# Patient Record
Sex: MALE | Race: WHITE | Employment: FULL TIME | URBAN - METROPOLITAN AREA
[De-identification: names, ages, dates, MRNs, and addresses within clinical notes are randomized per-mention and may not be internally consistent; named-entity substitution may affect disease eponyms.]

---

## 2017-06-19 ENCOUNTER — OFFICE VISIT (OUTPATIENT)
Dept: INTERNAL MEDICINE CLINIC | Age: 42
End: 2017-06-19

## 2017-06-19 VITALS
OXYGEN SATURATION: 98 % | SYSTOLIC BLOOD PRESSURE: 118 MMHG | BODY MASS INDEX: 35.12 KG/M2 | DIASTOLIC BLOOD PRESSURE: 70 MMHG | HEART RATE: 97 BPM | WEIGHT: 231 LBS

## 2017-06-19 DIAGNOSIS — K21.9 GASTROESOPHAGEAL REFLUX DISEASE, ESOPHAGITIS PRESENCE NOT SPECIFIED: Primary | ICD-10-CM

## 2017-06-19 PROCEDURE — 99213 OFFICE O/P EST LOW 20 MIN: CPT | Performed by: INTERNAL MEDICINE

## 2017-06-19 RX ORDER — PANTOPRAZOLE SODIUM 40 MG/1
40 TABLET, DELAYED RELEASE ORAL DAILY
Qty: 30 TABLET | Refills: 1 | Status: SHIPPED | OUTPATIENT
Start: 2017-06-19 | End: 2018-11-07

## 2017-06-21 ENCOUNTER — TELEPHONE (OUTPATIENT)
Dept: INTERNAL MEDICINE CLINIC | Age: 42
End: 2017-06-21

## 2017-06-22 ASSESSMENT — ENCOUNTER SYMPTOMS
SINUS PRESSURE: 0
RHINORRHEA: 0
COUGH: 0
CHEST TIGHTNESS: 0
SHORTNESS OF BREATH: 0
EYE REDNESS: 0
DIARRHEA: 1
RESPIRATORY NEGATIVE: 1
VOMITING: 0
BACK PAIN: 0
NAUSEA: 1
SORE THROAT: 0
WHEEZING: 0
ABDOMINAL PAIN: 1
EYES NEGATIVE: 1
CONSTIPATION: 0

## 2017-07-03 ENCOUNTER — TELEPHONE (OUTPATIENT)
Dept: INTERNAL MEDICINE CLINIC | Age: 42
End: 2017-07-03

## 2017-07-03 ENCOUNTER — OFFICE VISIT (OUTPATIENT)
Dept: INTERNAL MEDICINE CLINIC | Age: 42
End: 2017-07-03

## 2017-07-03 VITALS
RESPIRATION RATE: 20 BRPM | HEIGHT: 68 IN | SYSTOLIC BLOOD PRESSURE: 104 MMHG | DIASTOLIC BLOOD PRESSURE: 70 MMHG | BODY MASS INDEX: 34.1 KG/M2 | TEMPERATURE: 98 F | HEART RATE: 85 BPM | WEIGHT: 225 LBS | OXYGEN SATURATION: 99 %

## 2017-07-03 DIAGNOSIS — K21.9 GASTROESOPHAGEAL REFLUX DISEASE, ESOPHAGITIS PRESENCE NOT SPECIFIED: Primary | ICD-10-CM

## 2017-07-03 DIAGNOSIS — R10.13 EPIGASTRIC ABDOMINAL PAIN: ICD-10-CM

## 2017-07-03 PROCEDURE — 99213 OFFICE O/P EST LOW 20 MIN: CPT | Performed by: FAMILY MEDICINE

## 2017-07-03 ASSESSMENT — ENCOUNTER SYMPTOMS
BLOOD IN STOOL: 0
ABDOMINAL PAIN: 1

## 2018-02-09 ENCOUNTER — TELEPHONE (OUTPATIENT)
Dept: ORTHOPEDIC SURGERY | Age: 43
End: 2018-02-09

## 2018-09-20 ENCOUNTER — TELEPHONE (OUTPATIENT)
Dept: ORTHOPEDIC SURGERY | Age: 43
End: 2018-09-20

## 2018-09-20 ENCOUNTER — OFFICE VISIT (OUTPATIENT)
Dept: ORTHOPEDIC SURGERY | Age: 43
End: 2018-09-20

## 2018-09-20 VITALS
HEART RATE: 74 BPM | DIASTOLIC BLOOD PRESSURE: 67 MMHG | SYSTOLIC BLOOD PRESSURE: 134 MMHG | HEIGHT: 68 IN | BODY MASS INDEX: 31.83 KG/M2 | WEIGHT: 210 LBS

## 2018-09-20 DIAGNOSIS — S86.112A STRAIN OF GASTROCNEMIUS MUSCLE OF LEFT LOWER EXTREMITY, INITIAL ENCOUNTER: Primary | ICD-10-CM

## 2018-09-20 PROCEDURE — G8417 CALC BMI ABV UP PARAM F/U: HCPCS | Performed by: PHYSICIAN ASSISTANT

## 2018-09-20 PROCEDURE — 99213 OFFICE O/P EST LOW 20 MIN: CPT | Performed by: PHYSICIAN ASSISTANT

## 2018-09-20 PROCEDURE — L4360 PNEUMAT WALKING BOOT PRE CST: HCPCS | Performed by: PHYSICIAN ASSISTANT

## 2018-09-20 PROCEDURE — 1036F TOBACCO NON-USER: CPT | Performed by: PHYSICIAN ASSISTANT

## 2018-09-20 PROCEDURE — G8427 DOCREV CUR MEDS BY ELIG CLIN: HCPCS | Performed by: PHYSICIAN ASSISTANT

## 2018-09-20 RX ORDER — TRAMADOL HYDROCHLORIDE 50 MG/1
50 TABLET ORAL EVERY 6 HOURS PRN
Qty: 28 TABLET | Refills: 0 | Status: SHIPPED | OUTPATIENT
Start: 2018-09-20 | End: 2018-09-27
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
## 2018-09-24 ENCOUNTER — TELEPHONE (OUTPATIENT)
Dept: ORTHOPEDIC SURGERY | Age: 43
End: 2018-09-24

## 2018-09-24 NOTE — LETTER
[facility]  [address] [facility] [address]  Phone: [phone]  Fax: [phone]    Shelba Liner        September 24, 2018     Patient: Annalise Julio   YOB: 1975   Date of Visit: 9/24/2018       To Whom It May Concern: It is my medical opinion that Maxi Pink should remain out of work until 10/1/2018. If you have any questions or concerns, please don't hesitate to call.     Sincerely,        Cristal Blount PA-C

## 2018-09-26 ENCOUNTER — TELEPHONE (OUTPATIENT)
Dept: ORTHOPEDIC SURGERY | Age: 43
End: 2018-09-26

## 2018-10-03 ENCOUNTER — TELEPHONE (OUTPATIENT)
Dept: ORTHOPEDIC SURGERY | Age: 43
End: 2018-10-03

## 2018-10-03 ENCOUNTER — OFFICE VISIT (OUTPATIENT)
Dept: ORTHOPEDIC SURGERY | Age: 43
End: 2018-10-03
Payer: MEDICARE

## 2018-10-03 VITALS
WEIGHT: 190 LBS | HEART RATE: 71 BPM | BODY MASS INDEX: 28.79 KG/M2 | DIASTOLIC BLOOD PRESSURE: 88 MMHG | SYSTOLIC BLOOD PRESSURE: 132 MMHG | HEIGHT: 68 IN

## 2018-10-03 DIAGNOSIS — S86.112D GASTROCNEMIUS MUSCLE STRAIN, LEFT, SUBSEQUENT ENCOUNTER: ICD-10-CM

## 2018-10-03 DIAGNOSIS — S86.112D STRAIN OF LEFT SOLEUS MUSCLE, SUBSEQUENT ENCOUNTER: Primary | ICD-10-CM

## 2018-10-03 PROCEDURE — 99213 OFFICE O/P EST LOW 20 MIN: CPT | Performed by: PHYSICIAN ASSISTANT

## 2018-10-03 RX ORDER — IBUPROFEN 200 MG
200 TABLET ORAL EVERY 6 HOURS PRN
COMMUNITY
End: 2018-11-07

## 2018-10-03 RX ORDER — TRAMADOL HYDROCHLORIDE 50 MG/1
50 TABLET ORAL EVERY 6 HOURS PRN
Qty: 28 TABLET | Refills: 0 | Status: SHIPPED | OUTPATIENT
Start: 2018-10-03 | End: 2018-10-10

## 2018-10-10 ENCOUNTER — TELEPHONE (OUTPATIENT)
Dept: ORTHOPEDIC SURGERY | Age: 43
End: 2018-10-10

## 2018-10-10 ENCOUNTER — HOSPITAL ENCOUNTER (OUTPATIENT)
Dept: MRI IMAGING | Age: 43
Discharge: HOME | End: 2018-10-10
Payer: COMMERCIAL

## 2018-10-10 DIAGNOSIS — S86.112D GASTROCNEMIUS MUSCLE STRAIN, LEFT, SUBSEQUENT ENCOUNTER: ICD-10-CM

## 2018-10-10 DIAGNOSIS — S86.112D STRAIN OF LEFT SOLEUS MUSCLE, SUBSEQUENT ENCOUNTER: ICD-10-CM

## 2018-10-10 PROCEDURE — 73718 MRI LOWER EXTREMITY W/O DYE: CPT

## 2018-10-19 ENCOUNTER — OFFICE VISIT (OUTPATIENT)
Dept: ORTHOPEDIC SURGERY | Age: 43
End: 2018-10-19
Payer: MEDICARE

## 2018-10-19 ENCOUNTER — TELEPHONE (OUTPATIENT)
Dept: ORTHOPEDIC SURGERY | Age: 43
End: 2018-10-19

## 2018-10-19 VITALS
BODY MASS INDEX: 28.79 KG/M2 | DIASTOLIC BLOOD PRESSURE: 79 MMHG | WEIGHT: 190 LBS | HEART RATE: 68 BPM | HEIGHT: 68 IN | SYSTOLIC BLOOD PRESSURE: 119 MMHG

## 2018-10-19 DIAGNOSIS — S96.812A RUPTURE OF LEFT PLANTARIS TENDON, INITIAL ENCOUNTER: Primary | ICD-10-CM

## 2018-10-19 PROCEDURE — G8417 CALC BMI ABV UP PARAM F/U: HCPCS | Performed by: ORTHOPAEDIC SURGERY

## 2018-10-19 PROCEDURE — 99213 OFFICE O/P EST LOW 20 MIN: CPT | Performed by: ORTHOPAEDIC SURGERY

## 2018-10-19 PROCEDURE — 1036F TOBACCO NON-USER: CPT | Performed by: ORTHOPAEDIC SURGERY

## 2018-10-19 PROCEDURE — G8427 DOCREV CUR MEDS BY ELIG CLIN: HCPCS | Performed by: ORTHOPAEDIC SURGERY

## 2018-10-19 PROCEDURE — APPNB15 APP NON BILLABLE TIME 0-15 MINS: Performed by: ORTHOPAEDIC SURGERY

## 2018-10-19 PROCEDURE — G8484 FLU IMMUNIZE NO ADMIN: HCPCS | Performed by: ORTHOPAEDIC SURGERY

## 2018-10-23 ENCOUNTER — TELEPHONE (OUTPATIENT)
Dept: ORTHOPEDIC SURGERY | Age: 43
End: 2018-10-23

## 2018-10-24 ENCOUNTER — HOSPITAL ENCOUNTER (OUTPATIENT)
Dept: PHYSICAL THERAPY | Age: 43
Setting detail: THERAPIES SERIES
Discharge: HOME | End: 2018-10-24
Payer: MEDICARE

## 2018-10-24 PROCEDURE — 97110 THERAPEUTIC EXERCISES: CPT

## 2018-10-24 PROCEDURE — G8979 MOBILITY GOAL STATUS: HCPCS

## 2018-10-24 PROCEDURE — 97161 PT EVAL LOW COMPLEX 20 MIN: CPT

## 2018-10-24 PROCEDURE — G8978 MOBILITY CURRENT STATUS: HCPCS

## 2018-10-24 PROCEDURE — 97530 THERAPEUTIC ACTIVITIES: CPT

## 2018-10-24 ASSESSMENT — PAIN DESCRIPTION - DESCRIPTORS: DESCRIPTORS: ACHING;DISCOMFORT;DULL;PRESSURE

## 2018-10-24 ASSESSMENT — PAIN SCALES - GENERAL: PAINLEVEL_OUTOF10: 3

## 2018-10-24 ASSESSMENT — PAIN DESCRIPTION - PAIN TYPE: TYPE: ACUTE PAIN;CHRONIC PAIN

## 2018-10-24 ASSESSMENT — PAIN DESCRIPTION - FREQUENCY: FREQUENCY: CONTINUOUS

## 2018-10-24 ASSESSMENT — PAIN DESCRIPTION - LOCATION: LOCATION: LEG

## 2018-10-24 ASSESSMENT — PAIN DESCRIPTION - ORIENTATION: ORIENTATION: LEFT

## 2018-10-26 ENCOUNTER — HOSPITAL ENCOUNTER (OUTPATIENT)
Dept: PHYSICAL THERAPY | Age: 43
Setting detail: THERAPIES SERIES
Discharge: HOME | End: 2018-10-26
Payer: MEDICARE

## 2018-10-26 PROCEDURE — 97110 THERAPEUTIC EXERCISES: CPT

## 2018-10-26 PROCEDURE — 97140 MANUAL THERAPY 1/> REGIONS: CPT

## 2018-10-30 ENCOUNTER — HOSPITAL ENCOUNTER (OUTPATIENT)
Dept: PHYSICAL THERAPY | Age: 43
Setting detail: THERAPIES SERIES
Discharge: HOME | End: 2018-10-30
Payer: MEDICARE

## 2018-10-30 PROCEDURE — 97140 MANUAL THERAPY 1/> REGIONS: CPT

## 2018-10-30 PROCEDURE — 97110 THERAPEUTIC EXERCISES: CPT

## 2018-11-01 ENCOUNTER — HOSPITAL ENCOUNTER (OUTPATIENT)
Dept: PHYSICAL THERAPY | Age: 43
Setting detail: THERAPIES SERIES
Discharge: HOME | End: 2018-11-01
Payer: MEDICARE

## 2018-11-01 PROCEDURE — 97110 THERAPEUTIC EXERCISES: CPT

## 2018-11-01 PROCEDURE — 97140 MANUAL THERAPY 1/> REGIONS: CPT

## 2018-11-01 NOTE — FLOWSHEET NOTE
Physical Therapy Daily Treatment Note  Date:  2018    Patient Name:  Cha Subramanian    :  1975  MRN: [id number]  Restrictions/Precautions:  WBAT in walking boot until weaning out -  Medical/Treatment Diagnosis Information:   Diagnosis: Rupture of L plantaris muscle  Treatment Diagnosis: L lower LE pain, decreased L ankle ROM, decreased posterior compartment flexiblity, decreased gross L ankle strength, decreased jt mobility L ankle/foot    Tracking Information:  Physician Information Referring Practitioner: Sharron Black     Plan of Care Sent Date:10/24  Signed Received:    Visit Count / Total Visits      Insurance Approved Visits  1/??  Approved Dates:     Insurance Information PT Insurance Information: [place]   Progress Note/G-codes   []  Yes  [x]  No Next Due: next f/u 18     Pain level: 3-4/10 L medial calf    Subjective:  Pt reports he is doing okay today. States he's able to get swelling down for about an hour but then the swelling comes back. Objective:   Observation:   Test measurements:    10/30: AROM DF: 0 deg    Exercises:  Exercise/Equipment Resistance/Repetitions Other comments   Nu Step     Mat Table Long Sitting:   Gentle gastroc towel stretch 2 x 30 sec L LE only  Soleus stretch with towel 2 x 30 sec L LE       Yellow TB x 10 each  DF, PF, inver, ever     AAROM inv and evr x 10  AAROM DF/PF x 10      Seated at EOM:  BAPS DF/PF L2 x 10  Towel toe curl 2 x 10 : Attempted Circles on BAPS but unable to do full ROM                                                                  Other Therapeutic Activities:  10/24: Pt was educated on PT POC, Diagnosis, Prognosis, pathomechanics as well as frequency and duration of scheduling future physical therapy appointments. Time was also taken on this day to answer all patient questions and participation in PT.  Pt ed regarding appropriate technique for elevation    Home Exercise Program:  10/24: Patient was educated on home exercise program including distrubution of handout describing exercises, sets, repetitions, frequency and intensity. Exercises/activities include: towel gastroc stretch, ankle circles, ankle pumps    Manual Treatments:    10/31, 10/30: PROM ankle DF/manual gastroc stretching, subtalar and TC distraction grade II-III, 1st ray DF PROM, forefoot PROM inversion/eversion/splaying  10/26: gentle PROM TCJ, calcaneous mobs all directions grade 1-2, toe flexion and ext mobs grade 2, metatarsal mobs grade 2 x 9 minutes   10/24: gentle PROM DF    Modalities:  Declines ice    Timed Code Treatment Minutes:  28    Total Treatment Minutes:  28    Treatment/Activity Tolerance:  [x] Patient tolerated treatment well [] Patient limited by fatigue  [] Patient limited by pain  [] Patient limited by other medical complications  [x] Other: Pt presents with improved AROM DF this date. Continues to struggle with AROM inversion and eversion. Consider WB gastroc and soleus stretching at floor next week. Prognosis: [x] Good [] Fair  [] Poor    Patient Requires Follow-up: [x] Yes  [] No    Plan:   [x] Continue per plan of care [] Alter current plan (see comments)  [] Plan of care initiated [] Hold pending MD visit [] Discharge    Plan for Next Session:  WBAT in boot until weaning Nov 2-9 per Dr. Jaimee Qureshi note, focus on regaining ROM, jt mobility, strength    Electronically signed by:  Manasa Kuhn SPT  Therapist was present, directed the patient's care, made skilled judgement, and was responsible for assessment and treatment of the patient.

## 2018-11-06 ENCOUNTER — HOSPITAL ENCOUNTER (OUTPATIENT)
Dept: PHYSICAL THERAPY | Age: 43
Setting detail: THERAPIES SERIES
Discharge: HOME | End: 2018-11-06
Payer: MEDICARE

## 2018-11-06 PROCEDURE — 97140 MANUAL THERAPY 1/> REGIONS: CPT

## 2018-11-06 PROCEDURE — 97110 THERAPEUTIC EXERCISES: CPT

## 2018-11-07 ENCOUNTER — OFFICE VISIT (OUTPATIENT)
Dept: PRIMARY CARE CLINIC | Age: 43
End: 2018-11-07
Payer: MEDICARE

## 2018-11-07 VITALS
HEIGHT: 68 IN | DIASTOLIC BLOOD PRESSURE: 82 MMHG | BODY MASS INDEX: 31.07 KG/M2 | HEART RATE: 80 BPM | WEIGHT: 205 LBS | OXYGEN SATURATION: 98 % | SYSTOLIC BLOOD PRESSURE: 113 MMHG | TEMPERATURE: 97.6 F

## 2018-11-07 DIAGNOSIS — Z00.00 PREVENTATIVE HEALTH CARE: Primary | ICD-10-CM

## 2018-11-07 DIAGNOSIS — J45.30 MILD PERSISTENT ASTHMA WITHOUT COMPLICATION: ICD-10-CM

## 2018-11-07 PROCEDURE — G8484 FLU IMMUNIZE NO ADMIN: HCPCS | Performed by: INTERNAL MEDICINE

## 2018-11-07 PROCEDURE — 99386 PREV VISIT NEW AGE 40-64: CPT | Performed by: INTERNAL MEDICINE

## 2018-11-07 RX ORDER — SUCRALFATE 1 G/1
1 TABLET ORAL 4 TIMES DAILY
Qty: 120 TABLET | Refills: 5 | Status: SHIPPED | OUTPATIENT
Start: 2018-11-07 | End: 2021-12-27 | Stop reason: SDUPTHER

## 2018-11-07 ASSESSMENT — PATIENT HEALTH QUESTIONNAIRE - PHQ9
2. FEELING DOWN, DEPRESSED OR HOPELESS: 0
SUM OF ALL RESPONSES TO PHQ QUESTIONS 1-9: 0
1. LITTLE INTEREST OR PLEASURE IN DOING THINGS: 0
SUM OF ALL RESPONSES TO PHQ QUESTIONS 1-9: 0
SUM OF ALL RESPONSES TO PHQ9 QUESTIONS 1 & 2: 0

## 2018-11-07 ASSESSMENT — ENCOUNTER SYMPTOMS
BACK PAIN: 0
CONSTIPATION: 0
VOMITING: 0
SHORTNESS OF BREATH: 0
BLOOD IN STOOL: 0
COUGH: 0
DIARRHEA: 0
NAUSEA: 0
ABDOMINAL PAIN: 0

## 2018-11-09 ENCOUNTER — HOSPITAL ENCOUNTER (OUTPATIENT)
Dept: PHYSICAL THERAPY | Age: 43
Setting detail: THERAPIES SERIES
Discharge: HOME | End: 2018-11-09
Payer: MEDICARE

## 2018-11-09 ENCOUNTER — OFFICE VISIT (OUTPATIENT)
Dept: ORTHOPEDIC SURGERY | Age: 43
End: 2018-11-09
Payer: MEDICARE

## 2018-11-09 VITALS
DIASTOLIC BLOOD PRESSURE: 79 MMHG | HEIGHT: 68 IN | HEART RATE: 80 BPM | WEIGHT: 205 LBS | BODY MASS INDEX: 31.07 KG/M2 | SYSTOLIC BLOOD PRESSURE: 124 MMHG

## 2018-11-09 DIAGNOSIS — S86.112D RUPTURE OF LEFT GASTROCNEMIUS TENDON, SUBSEQUENT ENCOUNTER: ICD-10-CM

## 2018-11-09 DIAGNOSIS — S96.812D RUPTURE OF LEFT PLANTARIS TENDON, SUBSEQUENT ENCOUNTER: Primary | ICD-10-CM

## 2018-11-09 PROCEDURE — G8484 FLU IMMUNIZE NO ADMIN: HCPCS | Performed by: PHYSICIAN ASSISTANT

## 2018-11-09 PROCEDURE — 1036F TOBACCO NON-USER: CPT | Performed by: PHYSICIAN ASSISTANT

## 2018-11-09 PROCEDURE — G8427 DOCREV CUR MEDS BY ELIG CLIN: HCPCS | Performed by: PHYSICIAN ASSISTANT

## 2018-11-09 PROCEDURE — 97110 THERAPEUTIC EXERCISES: CPT

## 2018-11-09 PROCEDURE — 97140 MANUAL THERAPY 1/> REGIONS: CPT

## 2018-11-09 PROCEDURE — G8417 CALC BMI ABV UP PARAM F/U: HCPCS | Performed by: PHYSICIAN ASSISTANT

## 2018-11-09 PROCEDURE — 99213 OFFICE O/P EST LOW 20 MIN: CPT | Performed by: PHYSICIAN ASSISTANT

## 2018-11-09 NOTE — FLOWSHEET NOTE
Physical Therapy Daily Treatment Note  Date:  2018    Patient Name:  Bhavana Zhang    :  1975  MRN: [id number]  Restrictions/Precautions:  WBAT in walking boot until weaning out -  Medical/Treatment Diagnosis Information:   Diagnosis: Rupture of L plantaris muscle  Treatment Diagnosis: L lower LE pain, decreased L ankle ROM, decreased posterior compartment flexiblity, decreased gross L ankle strength, decreased jt mobility L ankle/foot    Tracking Information:  Physician Information Referring Practitioner: Kuldeep Ellis     Plan of Care Sent Date:10/24  Signed Received:    Visit Count / Total Visits      Insurance Approved Visits  1/??  Approved Dates:     Insurance Information PT Insurance Information: [place]   Progress Note/G-codes   []  Yes  [x]  No Next Due: next f/u 18     Pain level: 3-4/10 L medial calf     Subjective:  Pt reports he has been weaning out of the boot slowly. Continues to have tenderness on the calf. Objective:   Observation:   : pt brought new shoes to therapy, moderate support noted - wore L shoe throughout session  Test measurements:    10/30: AROM DF: 0 deg  : AROM DF: 2 deg from neutral,  PF: 38, Inv: 28 deg, Evr: 2 deg    Exercises:  Exercise/Equipment Resistance/Repetitions Other comments   Bike   Bike 3, Seat 7, Level 1.0 x 4 min    Mat Table       Yellow TB x 10 each  DF, PF, inver, ever               IB gastroc stretch 30 sec x 2, HT/TR 2 x 10  Soleus stretch at floor 2 x 30 sec    // Bars Tandem balance 2 x 30 sec B  Single leg balance 2 x 10 sec B                                                         Other Therapeutic Activities:  10/24: Pt was educated on PT POC, Diagnosis, Prognosis, pathomechanics as well as frequency and duration of scheduling future physical therapy appointments. Time was also taken on this day to answer all patient questions and participation in PT.  Pt ed regarding appropriate technique for elevation    Home Exercise Program:  10/24: Patient was educated on home exercise program including distrubution of handout describing exercises, sets, repetitions, frequency and intensity. Exercises/activities include: towel gastroc stretch, ankle circles, ankle pumps    Manual Treatments:    11/6, 10/31, 10/30: PROM ankle DF/manual gastroc stretching, subtalar and TC distraction grade II-III, 1st ray DF PROM, forefoot PROM inversion/eversion/splaying  10/26: gentle PROM TCJ, calcaneous mobs all directions grade 1-2, toe flexion and ext mobs grade 2, metatarsal mobs grade 2 x 9 minutes   10/24: gentle PROM DF    Modalities:  Declines ice    Timed Code Treatment Minutes:   30    Total Treatment Minutes:  30    Treatment/Activity Tolerance:  [x] Patient tolerated treatment well [] Patient limited by fatigue  [] Patient limited by pain  [] Patient limited by other medical complications  [x] Other: Continue to progress ROM and jt mobility. Pt able to tolerate progression of WB therex this date.      Prognosis: [x] Good [] Fair  [] Poor    Patient Requires Follow-up: [x] Yes  [] No    Plan:   [x] Continue per plan of care [] Alter current plan (see comments)  [] Plan of care initiated [] Hold pending MD visit [] Discharge    Plan for Next Session:  WBAT in boot until weaning Nov 2-9 per Dr. Danelle Small note, focus on regaining ROM, jt mobility, strength    Electronically signed by:  Jalyn Austin, PT, DPT

## 2018-11-12 ENCOUNTER — HOSPITAL ENCOUNTER (OUTPATIENT)
Dept: PHYSICAL THERAPY | Age: 43
Setting detail: THERAPIES SERIES
Discharge: HOME | End: 2018-11-12
Payer: MEDICARE

## 2018-11-12 PROCEDURE — 97140 MANUAL THERAPY 1/> REGIONS: CPT

## 2018-11-12 PROCEDURE — 97110 THERAPEUTIC EXERCISES: CPT

## 2018-11-12 NOTE — FLOWSHEET NOTE
therapy appointments. Time was also taken on this day to answer all patient questions and participation in PT. Pt ed regarding appropriate technique for elevation    Home Exercise Program:  10/24: Patient was educated on home exercise program including distrubution of handout describing exercises, sets, repetitions, frequency and intensity. Exercises/activities include: towel gastroc stretch, ankle circles, ankle pumps    Manual Treatments:    11/12, 11/6, 10/31, 10/30: PROM ankle DF/manual gastroc stretching, subtalar and TC distraction grade II-III, 1st ray DF PROM, forefoot PROM inversion/eversion/splaying  10/26: gentle PROM TCJ, calcaneous mobs all directions grade 1-2, toe flexion and ext mobs grade 2, metatarsal mobs grade 2 x 9 minutes   10/24: gentle PROM DF    Modalities:    11/12: CP to L ankle inclined seated x 10 mins   Declines ice    Timed Code Treatment Minutes:   30    Total Treatment Minutes:  40    Treatment/Activity Tolerance:  [x] Patient tolerated treatment well [] Patient limited by fatigue  [] Patient limited by pain  [] Patient limited by other medical complications  [x] Other: Continue to progress ROM and jt mobility. Pt able to tolerate progression of WB therex this date. Prognosis: [x] Good [] Fair  [] Poor    Patient Requires Follow-up: [x] Yes  [] No    Plan:   [x] Continue per plan of care [] Alter current plan (see comments)  [] Plan of care initiated [] Hold pending MD visit [] Discharge    Plan for Next Session:  WBAT in boot until weaning Nov 2-9 per Dr. Lucky Bosworth note, focus on regaining ROM, jt mobility, strength    Electronically signed by:  Guru Valdivia RUST  Therapist was present, directed the patient's care, made skilled judgement, and was responsible for assessment and treatment of the patient.

## 2018-11-14 ENCOUNTER — HOSPITAL ENCOUNTER (OUTPATIENT)
Dept: PHYSICAL THERAPY | Age: 43
Setting detail: THERAPIES SERIES
Discharge: HOME | End: 2018-11-14
Payer: MEDICARE

## 2018-11-14 PROCEDURE — 97110 THERAPEUTIC EXERCISES: CPT

## 2018-11-14 PROCEDURE — 97140 MANUAL THERAPY 1/> REGIONS: CPT

## 2018-11-19 ENCOUNTER — HOSPITAL ENCOUNTER (OUTPATIENT)
Dept: PHYSICAL THERAPY | Age: 43
Setting detail: THERAPIES SERIES
Discharge: HOME | End: 2018-11-19
Payer: MEDICARE

## 2018-11-19 PROCEDURE — 97110 THERAPEUTIC EXERCISES: CPT

## 2018-11-21 ENCOUNTER — HOSPITAL ENCOUNTER (OUTPATIENT)
Dept: PHYSICAL THERAPY | Age: 43
Setting detail: THERAPIES SERIES
Discharge: HOME | End: 2018-11-21
Payer: MEDICARE

## 2018-11-21 PROCEDURE — G8978 MOBILITY CURRENT STATUS: HCPCS

## 2018-11-21 PROCEDURE — 97110 THERAPEUTIC EXERCISES: CPT

## 2018-11-21 PROCEDURE — G8979 MOBILITY GOAL STATUS: HCPCS

## 2018-11-21 PROCEDURE — 97530 THERAPEUTIC ACTIVITIES: CPT

## 2018-11-21 NOTE — FLOWSHEET NOTE
exercise program including distrubution of handout describing exercises, sets, repetitions, frequency and intensity.  Exercises/activities include: towel gastroc stretch, ankle circles, ankle pumps    Manual Treatments:    11/14:PROM ankle DF/manual gastroc stretching, subtalar and TC distraction grade II-III, 1st ray DF PROM, forefoot PROM inversion/eversion/splaying, AAROM evr/inv   11/21, 11/12, 11/6, 10/31, 10/30: PROM ankle DF/manual gastroc stretching, subtalar and TC distraction grade II-III, 1st ray DF PROM, forefoot PROM inversion/eversion/splaying  10/26: gentle PROM TCJ, calcaneous mobs all directions grade 1-2, toe flexion and ext mobs grade 2, metatarsal mobs grade 2 x 9 minutes   10/24: gentle PROM DF    Modalities:    11/21, 11/19, 11/14, 11/12: CP to L ankle inclined seated x 10 mins   Declines ice    Timed Code Treatment Minutes:   30    Total Treatment Minutes:  40    Treatment/Activity Tolerance:  [x] Patient tolerated treatment well [] Patient limited by fatigue  [] Patient limited by pain  [] Patient limited by other medical complications  [x] Other:     Prognosis: [x] Good [] Fair  [] Poor    Patient Requires Follow-up: [x] Yes  [] No    Plan:   [x] Continue per plan of care [] Alter current plan (see comments)  [] Plan of care initiated [] Hold pending MD visit [] Discharge    Plan for Next Session:  WBAT in boot until weaning Nov 2-9 per Dr. Jaimee Qureshi note, focus on regaining ROM, jt mobility, strength    Electronically signed by:  Rose Kaur, PT, DPT

## 2018-11-26 ENCOUNTER — HOSPITAL ENCOUNTER (OUTPATIENT)
Dept: PHYSICAL THERAPY | Age: 43
Setting detail: THERAPIES SERIES
Discharge: HOME | End: 2018-11-26
Payer: MEDICARE

## 2018-11-26 PROCEDURE — 97112 NEUROMUSCULAR REEDUCATION: CPT

## 2018-11-26 PROCEDURE — 97110 THERAPEUTIC EXERCISES: CPT

## 2018-11-30 ENCOUNTER — HOSPITAL ENCOUNTER (OUTPATIENT)
Dept: PHYSICAL THERAPY | Age: 43
Setting detail: THERAPIES SERIES
Discharge: HOME | End: 2018-11-30
Payer: MEDICARE

## 2018-11-30 PROCEDURE — 97110 THERAPEUTIC EXERCISES: CPT

## 2018-12-03 ENCOUNTER — HOSPITAL ENCOUNTER (OUTPATIENT)
Dept: PHYSICAL THERAPY | Age: 43
Setting detail: THERAPIES SERIES
Discharge: HOME | End: 2018-12-03
Payer: MEDICARE

## 2018-12-03 PROCEDURE — 97140 MANUAL THERAPY 1/> REGIONS: CPT

## 2018-12-03 PROCEDURE — 97110 THERAPEUTIC EXERCISES: CPT

## 2018-12-03 NOTE — FLOWSHEET NOTE
participation in PT. Pt ed regarding appropriate technique for elevation    Home Exercise Program:  10/24: Patient was educated on home exercise program including distrubution of handout describing exercises, sets, repetitions, frequency and intensity.  Exercises/activities include: towel gastroc stretch, ankle circles, ankle pumps    Manual Treatments:   12/3: Hawkgrips 5 to L medial gastroc and plantaris (feels very thick, possibly calcification?) X 8 minutes    11/30: STM to soft tissue adhesions along L inverters/gastroc border x 5 minutes  11/14:PROM ankle DF/manual gastroc stretching, subtalar and TC distraction grade II-III, 1st ray DF PROM, forefoot PROM inversion/eversion/splaying, AAROM evr/inv   11/21, 11/12, 11/6, 10/31, 10/30: PROM ankle DF/manual gastroc stretching, subtalar and TC distraction grade II-III, 1st ray DF PROM, forefoot PROM inversion/eversion/splaying  10/26: gentle PROM TCJ, calcaneous mobs all directions grade 1-2, toe flexion and ext mobs grade 2, metatarsal mobs grade 2 x 9 minutes   10/24: gentle PROM DF    Modalities:    11/30, 11/21, 11/19, 11/14, 11/12: CP to L ankle inclined seated x 10 mins   Declines ice    Timed Code Treatment Minutes:  28    Total Treatment Minutes: 28    Treatment/Activity Tolerance:  [x] Patient tolerated treatment well [] Patient limited by fatigue  [] Patient limited by pain  [] Patient limited by other medical complications  [x] Other: STM of adhesed tissues in L leg medial compartment      Prognosis: [x] Good [] Fair  [] Poor    Patient Requires Follow-up: [x] Yes  [] No    Plan:   [x] Continue per plan of care [] Alter current plan (see comments)  [] Plan of care initiated [] Hold pending MD visit [] Discharge    Plan for Next Session:  WBAT in boot until weaning Nov 2-9 per Dr. Nimisha Miner note, focus on regaining ROM, jt mobility, strength    Electronically signed by:  Yaz Abreu, PT, DPT

## 2018-12-05 ENCOUNTER — HOSPITAL ENCOUNTER (OUTPATIENT)
Dept: PHYSICAL THERAPY | Age: 43
Setting detail: THERAPIES SERIES
Discharge: HOME | End: 2018-12-05
Payer: MEDICARE

## 2018-12-05 PROCEDURE — 97110 THERAPEUTIC EXERCISES: CPT

## 2018-12-05 PROCEDURE — 97530 THERAPEUTIC ACTIVITIES: CPT

## 2018-12-05 NOTE — FLOWSHEET NOTE
frequency and duration of scheduling future physical therapy appointments. Time was also taken on this day to answer all patient questions and participation in PT. Pt ed regarding appropriate technique for elevation    Home Exercise Program:  10/24: Patient was educated on home exercise program including distrubution of handout describing exercises, sets, repetitions, frequency and intensity.  Exercises/activities include: towel gastroc stretch, ankle circles, ankle pumps    Manual Treatments:   12/3: Hawkgrips 5 to L medial gastroc and plantaris (feels very thick, possibly calcification?) X 8 minutes    11/30: STM to soft tissue adhesions along L inverters/gastroc border x 5 minutes  11/14:PROM ankle DF/manual gastroc stretching, subtalar and TC distraction grade II-III, 1st ray DF PROM, forefoot PROM inversion/eversion/splaying, AAROM evr/inv   11/21, 11/12, 11/6, 10/31, 10/30: PROM ankle DF/manual gastroc stretching, subtalar and TC distraction grade II-III, 1st ray DF PROM, forefoot PROM inversion/eversion/splaying  10/26: gentle PROM TCJ, calcaneous mobs all directions grade 1-2, toe flexion and ext mobs grade 2, metatarsal mobs grade 2 x 9 minutes   10/24: gentle PROM DF    Modalities:    11/30, 11/21, 11/19, 11/14, 11/12: CP to L ankle inclined seated x 10 mins   Declines ice    Timed Code Treatment Minutes:  30    Total Treatment Minutes: 30    Treatment/Activity Tolerance:  [x] Patient tolerated treatment well [] Patient limited by fatigue  [] Patient limited by pain  [] Patient limited by other medical complications  [x] Other: SEE PN     Prognosis: [x] Good [] Fair  [] Poor    Patient Requires Follow-up: [x] Yes  [] No    Plan:   [x] Continue per plan of care [] Alter current plan (see comments)  [] Plan of care initiated [] Hold pending MD visit [] Discharge    Plan for Next Session:  WBAT in boot until weaning Nov 2-9 per Dr. Ellis Ayala note, focus on regaining ROM, jt mobility, strength    Electronically

## 2018-12-07 ENCOUNTER — OFFICE VISIT (OUTPATIENT)
Dept: ORTHOPEDIC SURGERY | Age: 43
End: 2018-12-07
Payer: MEDICARE

## 2018-12-07 VITALS
BODY MASS INDEX: 31.07 KG/M2 | HEIGHT: 68 IN | DIASTOLIC BLOOD PRESSURE: 86 MMHG | WEIGHT: 205 LBS | SYSTOLIC BLOOD PRESSURE: 126 MMHG | HEART RATE: 80 BPM

## 2018-12-07 DIAGNOSIS — S86.112D RUPTURE OF LEFT GASTROCNEMIUS TENDON, SUBSEQUENT ENCOUNTER: ICD-10-CM

## 2018-12-07 DIAGNOSIS — S96.812D RUPTURE OF LEFT PLANTARIS TENDON, SUBSEQUENT ENCOUNTER: Primary | ICD-10-CM

## 2018-12-07 PROCEDURE — 1036F TOBACCO NON-USER: CPT | Performed by: PHYSICIAN ASSISTANT

## 2018-12-07 PROCEDURE — 99213 OFFICE O/P EST LOW 20 MIN: CPT | Performed by: PHYSICIAN ASSISTANT

## 2018-12-07 PROCEDURE — G8427 DOCREV CUR MEDS BY ELIG CLIN: HCPCS | Performed by: PHYSICIAN ASSISTANT

## 2018-12-07 PROCEDURE — G8484 FLU IMMUNIZE NO ADMIN: HCPCS | Performed by: PHYSICIAN ASSISTANT

## 2018-12-07 PROCEDURE — G8417 CALC BMI ABV UP PARAM F/U: HCPCS | Performed by: PHYSICIAN ASSISTANT

## 2018-12-07 NOTE — PROGRESS NOTES
infection    Impression:   Diagnosis Orders   1. Rupture of left plantaris tendon, subsequent encounter     2. Rupture of left gastrocnemius tendon, subsequent encounter         Treatment Plan:    Patient is doing well 11 weeks out from his injury. His pain is minimal.  His range of motion and strength are great. He will plan on finishing physical therapy this month. He is considering joining a gym to continue working on strengthening of the left lower leg. He is still not back to work stating temporary work assignment he was on at the time has ended. He does not know when he will start another work assignment. Patient will plan on following up in 6 weeks or before that time with any concerns. Desi Webster was informed of the results of any imaging. We discussed treatment options and a time was given to answer questions. A plan was proposed and Desi Webster understand and accepts this course of care. Electronically signed by Archie David PA-C on 08/3/1481  Board Certified [facility]    Please note that portions of this note were completed with a voice recognition program.  Efforts were made to edit the dictations but occasionally words are mis-transcribed.

## 2018-12-10 ENCOUNTER — HOSPITAL ENCOUNTER (OUTPATIENT)
Dept: PHYSICAL THERAPY | Age: 43
Setting detail: THERAPIES SERIES
Discharge: HOME | End: 2018-12-10
Payer: MEDICARE

## 2018-12-10 PROCEDURE — 97110 THERAPEUTIC EXERCISES: CPT

## 2018-12-10 PROCEDURE — 97140 MANUAL THERAPY 1/> REGIONS: CPT

## 2018-12-10 NOTE — FLOWSHEET NOTE
care initiated [] Hold pending MD visit [] Discharge    Plan for Next Session:  WBAT in boot until weaning Nov 2-9 per Dr. Brandon Hernandez note, focus on regaining ROM, jt mobility, strength    Electronically signed by:  Ezra Bullard, PT, DPT

## 2018-12-12 ENCOUNTER — HOSPITAL ENCOUNTER (OUTPATIENT)
Dept: PHYSICAL THERAPY | Age: 43
Setting detail: THERAPIES SERIES
Discharge: HOME | End: 2018-12-12
Payer: MEDICARE

## 2018-12-12 PROCEDURE — 97140 MANUAL THERAPY 1/> REGIONS: CPT

## 2018-12-12 PROCEDURE — 97110 THERAPEUTIC EXERCISES: CPT

## 2018-12-12 NOTE — FLOWSHEET NOTE
Other Therapeutic Activities:  10/24: Pt was educated on PT POC, Diagnosis, Prognosis, pathomechanics as well as frequency and duration of scheduling future physical therapy appointments. Time was also taken on this day to answer all patient questions and participation in PT. Pt ed regarding appropriate technique for elevation    Home Exercise Program:  10/24: Patient was educated on home exercise program including distrubution of handout describing exercises, sets, repetitions, frequency and intensity. Exercises/activities include: towel gastroc stretch, ankle circles, ankle pumps    Manual Treatments:   12/12: IASTM with Hawkgrips, 2, 7, 9 to posterior compartment to lower L LE x 10   12/10, 12/3: Hawkgrips 5 to L medial gastroc and plantaris (feels very thick, possibly calcification?) X 8 minutes    11/30: STM to soft tissue adhesions along L inverters/gastroc border x 5 minutes  11/14:PROM ankle DF/manual gastroc stretching, subtalar and TC distraction grade II-III, 1st ray DF PROM, forefoot PROM inversion/eversion/splaying, AAROM evr/inv   11/21, 11/12, 11/6, 10/31, 10/30: PROM ankle DF/manual gastroc stretching, subtalar and TC distraction grade II-III, 1st ray DF PROM, forefoot PROM inversion/eversion/splaying  10/26: gentle PROM TCJ, calcaneous mobs all directions grade 1-2, toe flexion and ext mobs grade 2, metatarsal mobs grade 2 x 9 minutes   10/24: gentle PROM DF    Modalities:    12/10, 11/30, 11/21, 11/19, 11/14, 11/12: CP to L ankle inclined seated x 10 mins   Declines ice    Timed Code Treatment Minutes:  30    Total Treatment Minutes: 30    Treatment/Activity Tolerance:  [x] Patient tolerated treatment well [] Patient limited by fatigue  [] Patient limited by pain  [] Patient limited by other medical complications  [x] Other: Difficulty with eccentric control of HR on TG. Unable to perform up with 2, down with 1. Able to perform in double limb. Continue IASTM as appropriate.      Prognosis: [x]

## 2018-12-17 ENCOUNTER — HOSPITAL ENCOUNTER (OUTPATIENT)
Dept: PHYSICAL THERAPY | Age: 43
Setting detail: THERAPIES SERIES
Discharge: HOME | End: 2018-12-17
Payer: MEDICARE

## 2018-12-17 PROCEDURE — 97140 MANUAL THERAPY 1/> REGIONS: CPT

## 2018-12-17 PROCEDURE — 97110 THERAPEUTIC EXERCISES: CPT

## 2018-12-17 NOTE — FLOWSHEET NOTE
Physical Therapy Daily Treatment Note  Date:  2018    Patient Name:  Brooklyn Gunter    :  1975  MRN: [id number]  Restrictions/Precautions:  WBAT in walking boot until weaning out Nov -  Medical/Treatment Diagnosis Information:   Diagnosis: Rupture of L plantaris muscle  Treatment Diagnosis: L lower LE pain, decreased L ankle ROM, decreased posterior compartment flexiblity, decreased gross L ankle strength, decreased jt mobility L ankle/foot    Tracking Information:  Physician Information Referring Practitioner: Pedro Beard     Plan of Care Sent Date:10/24  Signed Received:    Visit Count / Total Visits   +     Insurance Approved Visits  1/??  Approved Dates:     Insurance Information PT Insurance Information: [place]   Progress Note/G-codes   []  Yes  [x]  No Next Due: next f/u 18     Pain level:  0/10 L medial calf     Subjective:  Pt reports no pain but still feels tightness in the L lower leg. At times has stinging in tight area - doesn't notice a pattern.       Objective:   Observation:   : pt brought new shoes to therapy, moderate support noted - wore L shoe throughout session  Test measurements:    10/30: AROM DF: 0 deg  : AROM DF: 2 deg from neutral,  PF: 38, Inv: 28 deg, Evr: 2 deg  : AROM DF: 4 deg  : SEE PN  : SEE PN    Exercises:  Exercise/Equipment Resistance/Repetitions Other comments   Bike   Bike 1, Seat 11, Level 1.0 x 5 min    Mat Table                     IB Soleus and gastroc stretch 30 sec x 2, Single leg heel raise x 10 B, B UE support     // Bars     t          Wall     shuttle SLS with intermittent UE support x 10 sec x 3 B Forward step ups leading L x 15 total, 2-0 UE support    Squats no UE support x 10     TG    Ballet barre    Willie on L   DF and inversion are most difficulty     rebounder    trampoline Small hop B pushing off from toes x 10Lateral hop  x 10 onto single leg   Large hop leaving surface of trampoline x 10All with B

## 2018-12-19 ENCOUNTER — HOSPITAL ENCOUNTER (OUTPATIENT)
Dept: PHYSICAL THERAPY | Age: 43
Setting detail: THERAPIES SERIES
Discharge: HOME | End: 2018-12-19
Payer: MEDICARE

## 2018-12-19 PROCEDURE — 97110 THERAPEUTIC EXERCISES: CPT

## 2018-12-19 PROCEDURE — 97140 MANUAL THERAPY 1/> REGIONS: CPT

## 2018-12-19 NOTE — FLOWSHEET NOTE
Physical Therapy Daily Treatment Note  Date:  2018    Patient Name:  Clementina Polk    :  1975  MRN: [id number]  Restrictions/Precautions:  WBAT in walking boot until weaning out -  Medical/Treatment Diagnosis Information:   Diagnosis: Rupture of L plantaris muscle  Treatment Diagnosis: L lower LE pain, decreased L ankle ROM, decreased posterior compartment flexiblity, decreased gross L ankle strength, decreased jt mobility L ankle/foot    Tracking Information:  Physician Information Referring Practitioner: Kev Cr     Plan of Care Sent Date:10/24  Signed Received:    Visit Count / Total Visits   +     Insurance Approved Visits  1/??  Approved Dates:     Insurance Information PT Insurance Information: [place]   Progress Note/G-codes   []  Yes  [x]  No Next Due: next f/u 18     Pain level:  0/10 L medial calf     Subjective:  Pt reports mostly having weird tension feeling in the (medial) calf.  Continues to feel area of hardness in the (medial calf)      Objective:   Observation:   : pt brought new shoes to therapy, moderate support noted - wore L shoe throughout session  Test measurements:    10/30: AROM DF: 0 deg  : AROM DF: 2 deg from neutral,  PF: 38, Inv: 28 deg, Evr: 2 deg  : AROM DF: 4 deg  : SEE PN  : SEE PN    Exercises:  Exercise/Equipment Resistance/Repetitions Other comments   Bike   Bike 1, Seat 11, Level 3.0 x 3 min    Mat Table                     IB Soleus and gastroc stretch 30 sec x 2, Single leg heel raise x 10 B, B UE support     // Bars     t      6\" anterior step down 2 x 10 L LE only (first set UE assist)    Wall     shuttle SLS with intermittent UE support x 10 sec x 3 B Forward step ups leading L x 15 total, 2-0 UE support    Squats no UE support x 10     TG Squats x 10Single leg squats x 10 B  With toe bar, HR 1 x 10 (focus on eccentric control)   Ballet barre    Willie on L   DF and inversion are most difficulty Patient limited by fatigue  [] Patient limited by pain  [] Patient limited by other medical complications  [x] Other: Slowly progressing PF eccentric strengthening. Continue to progress plyometric activities as able.      Prognosis: [x] Good [] Fair  [] Poor    Patient Requires Follow-up: [x] Yes  [] No    Plan:   [x] Continue per plan of care [] Alter current plan (see comments)  [] Plan of care initiated [] Hold pending MD visit [] Discharge    Plan for Next Session:  WBAT in boot until weaning Nov 2-9 per Dr. Danelle Small note, focus on regaining ROM, jt mobility, strength    Electronically signed by:  Jalyn Austin, PT, DPT

## 2018-12-27 ENCOUNTER — HOSPITAL ENCOUNTER (OUTPATIENT)
Dept: PHYSICAL THERAPY | Age: 43
Setting detail: THERAPIES SERIES
Discharge: HOME | End: 2018-12-27
Payer: MEDICARE

## 2018-12-27 PROCEDURE — 97110 THERAPEUTIC EXERCISES: CPT

## 2018-12-27 PROCEDURE — 97140 MANUAL THERAPY 1/> REGIONS: CPT

## 2018-12-27 NOTE — FLOWSHEET NOTE
Physical Therapy Daily Treatment Note  Date:  2018    Patient Name:  Stella Whitehead    :  1975  MRN: [id number]  Restrictions/Precautions:  WBAT in walking boot until weaning out Nov -  Medical/Treatment Diagnosis Information:   Diagnosis: Rupture of L plantaris muscle  Treatment Diagnosis: L lower LE pain, decreased L ankle ROM, decreased posterior compartment flexiblity, decreased gross L ankle strength, decreased jt mobility L ankle/foot    Tracking Information:  Physician Information Referring Practitioner: Chuckie Oliva     Plan of Care Sent Date:10/24  Signed Received:    Visit Count / Total Visits   +     Insurance Approved Visits  1/??  Approved Dates:     Insurance Information PT Insurance Information: [place]   Progress Note/G-codes   []  Yes  [x]  No Next Due: next f/u 18     Pain level:  0/10 L medial calf     Subjective:  Pt reports he still feels like he has a knot in the muscle. Saturday was painful because he had to do a lot of walking.      Objective:   Observation:   : pt brought new shoes to therapy, moderate support noted - wore L shoe throughout session  Test measurements:    10/30: AROM DF: 0 deg  : AROM DF: 2 deg from neutral,  PF: 38, Inv: 28 deg, Evr: 2 deg  : AROM DF: 4 deg  : SEE PN  : SEE PN    Exercises:  Exercise/Equipment Resistance/Repetitions Other comments   Bike       Mat Table                     IB     // Bars    Wall    shuttle    TG    Ballet barre    [place] on L  DF and inversion are most difficulty     rebounder    trampoline All with B UE support   healthplex Elliptical x 3 mins    Heel raise machine 45# x 10  Leg ext 30# x 15  Ham curl 50# x 15  Weighted squat 50# x 10  gastroc and soleus stretch 1 min each : Excessive knee ext with HR   treadmill             Other Therapeutic Activities:  10/24: Pt was educated on PT POC, Diagnosis, Prognosis, pathomechanics as well as frequency and duration of scheduling future physical therapy appointments. Time was also taken on this day to answer all patient questions and participation in PT. Pt ed regarding appropriate technique for elevation    Home Exercise Program:  10/24: Patient was educated on home exercise program including distrubution of handout describing exercises, sets, repetitions, frequency and intensity.  Exercises/activities include: towel gastroc stretch, ankle circles, ankle pumps    Manual Treatments:   12/27, 12/19, 12/12: IASTM with Hawkgrips, 2, 7, 9 to posterior compartment of lower L LE x 10   12/17, 12/10, 12/3: Hawkgrips 5 to L medial gastroc and plantaris (feels very thick, possibly calcification?) X 8 minutes    11/30: STM to soft tissue adhesions along L inverters/gastroc border x 5 minutes  11/14:PROM ankle DF/manual gastroc stretching, subtalar and TC distraction grade II-III, 1st ray DF PROM, forefoot PROM inversion/eversion/splaying, AAROM evr/inv   11/21, 11/12, 11/6, 10/31, 10/30: PROM ankle DF/manual gastroc stretching, subtalar and TC distraction grade II-III, 1st ray DF PROM, forefoot PROM inversion/eversion/splaying  10/26: gentle PROM TCJ, calcaneous mobs all directions grade 1-2, toe flexion and ext mobs grade 2, metatarsal mobs grade 2 x 9 minutes   10/24: gentle PROM DF    Modalities:    12/27, 12/17, 12/10, 11/30, 11/21, 11/19, 11/14, 11/12: CP to L ankle inclined seated x 10 mins   Declines ice    Timed Code Treatment Minutes:  31    Total Treatment Minutes: 41    Treatment/Activity Tolerance:  [x] Patient tolerated treatment well [] Patient limited by fatigue  [] Patient limited by pain  [] Patient limited by other medical complications  [x] Other: DC NV, pt has been advised to continue to work on flexibility post DC     Prognosis: [x] Good [] Fair  [] Poor    Patient Requires Follow-up: [x] Yes  [] No    Plan:   [x] Continue per plan of care [] Alter current plan (see comments)  [] Plan of care initiated [] Hold pending MD visit [] Discharge    Plan for Next Session:  WBAT in boot until weaning Nov 2-9 per Dr. Ge Police note, focus on regaining ROM, jt mobility, strength    Electronically signed by:  Annika Loyd, PT, DPT

## 2018-12-28 ENCOUNTER — HOSPITAL ENCOUNTER (OUTPATIENT)
Dept: PHYSICAL THERAPY | Age: 43
Setting detail: THERAPIES SERIES
Discharge: HOME | End: 2018-12-28
Payer: MEDICARE

## 2018-12-28 PROCEDURE — 97530 THERAPEUTIC ACTIVITIES: CPT

## 2018-12-28 PROCEDURE — G8979 MOBILITY GOAL STATUS: HCPCS

## 2018-12-28 PROCEDURE — 97110 THERAPEUTIC EXERCISES: CPT

## 2018-12-28 PROCEDURE — G8978 MOBILITY CURRENT STATUS: HCPCS

## 2018-12-31 ENCOUNTER — HOSPITAL ENCOUNTER (OUTPATIENT)
Dept: PHYSICAL THERAPY | Age: 43
Setting detail: THERAPIES SERIES
Discharge: HOME | End: 2018-12-31
Payer: MEDICARE

## 2018-12-31 PROCEDURE — 97110 THERAPEUTIC EXERCISES: CPT

## 2019-01-02 ENCOUNTER — TELEPHONE (OUTPATIENT)
Dept: ORTHOPEDIC SURGERY | Age: 44
End: 2019-01-02

## 2019-01-02 ENCOUNTER — HOSPITAL ENCOUNTER (OUTPATIENT)
Dept: PHYSICAL THERAPY | Age: 44
Setting detail: THERAPIES SERIES
Discharge: HOME | End: 2019-01-02
Payer: MEDICARE

## 2019-01-02 DIAGNOSIS — M79.662 PAIN AND SWELLING OF LEFT LOWER LEG: ICD-10-CM

## 2019-01-02 DIAGNOSIS — S86.112D RUPTURE OF LEFT GASTROCNEMIUS TENDON, SUBSEQUENT ENCOUNTER: ICD-10-CM

## 2019-01-02 DIAGNOSIS — S96.812D RUPTURE OF LEFT PLANTARIS TENDON, SUBSEQUENT ENCOUNTER: Primary | ICD-10-CM

## 2019-01-02 DIAGNOSIS — M79.89 PAIN AND SWELLING OF LEFT LOWER LEG: ICD-10-CM

## 2019-01-02 PROCEDURE — 97110 THERAPEUTIC EXERCISES: CPT

## 2019-01-03 ENCOUNTER — TELEPHONE (OUTPATIENT)
Dept: ORTHOPEDIC SURGERY | Age: 44
End: 2019-01-03

## 2019-01-04 ENCOUNTER — HOSPITAL ENCOUNTER (OUTPATIENT)
Dept: VASCULAR LAB | Age: 44
Discharge: HOME | End: 2019-01-04
Payer: MEDICARE

## 2019-01-04 ENCOUNTER — TELEPHONE (OUTPATIENT)
Dept: ORTHOPEDIC SURGERY | Age: 44
End: 2019-01-04

## 2019-01-04 DIAGNOSIS — M79.89 PAIN AND SWELLING OF LEFT LOWER LEG: ICD-10-CM

## 2019-01-04 DIAGNOSIS — S86.112D RUPTURE OF LEFT GASTROCNEMIUS TENDON, SUBSEQUENT ENCOUNTER: ICD-10-CM

## 2019-01-04 DIAGNOSIS — M79.662 PAIN AND SWELLING OF LEFT LOWER LEG: ICD-10-CM

## 2019-01-04 DIAGNOSIS — S96.812D RUPTURE OF LEFT PLANTARIS TENDON, SUBSEQUENT ENCOUNTER: ICD-10-CM

## 2019-01-04 PROCEDURE — 93971 EXTREMITY STUDY: CPT

## 2019-01-07 ENCOUNTER — HOSPITAL ENCOUNTER (OUTPATIENT)
Dept: PHYSICAL THERAPY | Age: 44
Setting detail: THERAPIES SERIES
Discharge: HOME | End: 2019-01-07
Payer: MEDICARE

## 2019-01-07 PROCEDURE — 97110 THERAPEUTIC EXERCISES: CPT

## 2019-01-09 ENCOUNTER — APPOINTMENT (OUTPATIENT)
Dept: PHYSICAL THERAPY | Age: 44
End: 2019-01-09
Payer: MEDICARE

## 2019-01-14 ENCOUNTER — HOSPITAL ENCOUNTER (OUTPATIENT)
Dept: PHYSICAL THERAPY | Age: 44
Setting detail: THERAPIES SERIES
Discharge: HOME | End: 2019-01-14
Payer: MEDICARE

## 2019-01-14 PROCEDURE — 97110 THERAPEUTIC EXERCISES: CPT

## 2019-01-14 PROCEDURE — 97530 THERAPEUTIC ACTIVITIES: CPT

## 2019-01-18 ENCOUNTER — OFFICE VISIT (OUTPATIENT)
Dept: ORTHOPEDIC SURGERY | Age: 44
End: 2019-01-18
Payer: MEDICARE

## 2019-01-18 VITALS
SYSTOLIC BLOOD PRESSURE: 126 MMHG | BODY MASS INDEX: 31.07 KG/M2 | HEIGHT: 68 IN | DIASTOLIC BLOOD PRESSURE: 83 MMHG | WEIGHT: 205 LBS | HEART RATE: 71 BPM

## 2019-01-18 DIAGNOSIS — S96.812D RUPTURE OF LEFT PLANTARIS TENDON, SUBSEQUENT ENCOUNTER: Primary | ICD-10-CM

## 2019-01-18 DIAGNOSIS — S86.112D RUPTURE OF LEFT GASTROCNEMIUS TENDON, SUBSEQUENT ENCOUNTER: ICD-10-CM

## 2019-01-18 PROCEDURE — G8417 CALC BMI ABV UP PARAM F/U: HCPCS | Performed by: PHYSICIAN ASSISTANT

## 2019-01-18 PROCEDURE — 1036F TOBACCO NON-USER: CPT | Performed by: PHYSICIAN ASSISTANT

## 2019-01-18 PROCEDURE — 99213 OFFICE O/P EST LOW 20 MIN: CPT | Performed by: PHYSICIAN ASSISTANT

## 2019-01-18 PROCEDURE — G8427 DOCREV CUR MEDS BY ELIG CLIN: HCPCS | Performed by: PHYSICIAN ASSISTANT

## 2019-01-18 PROCEDURE — G8484 FLU IMMUNIZE NO ADMIN: HCPCS | Performed by: PHYSICIAN ASSISTANT

## 2019-01-25 ENCOUNTER — HOSPITAL ENCOUNTER (EMERGENCY)
Age: 44
Discharge: HOME | End: 2019-01-25
Payer: MEDICARE

## 2019-01-25 ENCOUNTER — APPOINTMENT (OUTPATIENT)
Dept: GENERAL RADIOLOGY | Age: 44
End: 2019-01-25
Payer: MEDICARE

## 2019-01-25 VITALS
HEIGHT: 68 IN | RESPIRATION RATE: 16 BRPM | HEART RATE: 88 BPM | BODY MASS INDEX: 38.8 KG/M2 | OXYGEN SATURATION: 95 % | SYSTOLIC BLOOD PRESSURE: 117 MMHG | TEMPERATURE: 98.1 F | WEIGHT: 256 LBS | DIASTOLIC BLOOD PRESSURE: 81 MMHG

## 2019-01-25 DIAGNOSIS — J45.901 EXACERBATION OF ASTHMA, UNSPECIFIED ASTHMA SEVERITY, UNSPECIFIED WHETHER PERSISTENT: Primary | ICD-10-CM

## 2019-01-25 LAB
A/G RATIO: 1.2 (ref 1.1–2.2)
ALBUMIN SERPL-MCNC: 4.5 G/DL (ref 3.4–5)
ALP BLD-CCNC: 73 U/L (ref 40–129)
ALT SERPL-CCNC: 37 U/L (ref 10–40)
ANION GAP SERPL CALCULATED.3IONS-SCNC: 12 MMOL/L (ref 3–16)
AST SERPL-CCNC: 65 U/L (ref 15–37)
BASOPHILS ABSOLUTE: 0.1 K/UL (ref 0–0.2)
BASOPHILS RELATIVE PERCENT: 0.9 %
BILIRUB SERPL-MCNC: 0.3 MG/DL (ref 0–1)
BUN BLDV-MCNC: 16 MG/DL (ref 7–20)
CALCIUM SERPL-MCNC: 9.3 MG/DL (ref 8.3–10.6)
CHLORIDE BLD-SCNC: 101 MMOL/L (ref 99–110)
CO2: 24 MMOL/L (ref 21–32)
CREAT SERPL-MCNC: 1 MG/DL (ref 0.9–1.3)
EOSINOPHILS ABSOLUTE: 0.4 K/UL (ref 0–0.6)
EOSINOPHILS RELATIVE PERCENT: 4.7 %
GFR AFRICAN AMERICAN: >60
GFR NON-AFRICAN AMERICAN: >60
GLOBULIN: 3.9 G/DL
GLUCOSE BLD-MCNC: 98 MG/DL (ref 70–99)
HCT VFR BLD CALC: 44 % (ref 40.5–52.5)
HEMOGLOBIN: 14.5 G/DL (ref 13.5–17.5)
LYMPHOCYTES ABSOLUTE: 2.3 K/UL (ref 1–5.1)
LYMPHOCYTES RELATIVE PERCENT: 25.3 %
MCH RBC QN AUTO: 31.2 PG (ref 26–34)
MCHC RBC AUTO-ENTMCNC: 33 G/DL (ref 31–36)
MCV RBC AUTO: 94.5 FL (ref 80–100)
MONOCYTES ABSOLUTE: 0.7 K/UL (ref 0–1.3)
MONOCYTES RELATIVE PERCENT: 7.4 %
NEUTROPHILS ABSOLUTE: 5.7 K/UL (ref 1.7–7.7)
NEUTROPHILS RELATIVE PERCENT: 61.7 %
PDW BLD-RTO: 14.5 % (ref 12.4–15.4)
PLATELET # BLD: 290 K/UL (ref 135–450)
PMV BLD AUTO: 8.8 FL (ref 5–10.5)
POTASSIUM SERPL-SCNC: 3.7 MMOL/L (ref 3.5–5.1)
RBC # BLD: 4.66 M/UL (ref 4.2–5.9)
SODIUM BLD-SCNC: 137 MMOL/L (ref 136–145)
TOTAL PROTEIN: 8.4 G/DL (ref 6.4–8.2)
WBC # BLD: 9.3 K/UL (ref 4–11)

## 2019-01-25 PROCEDURE — 94640 AIRWAY INHALATION TREATMENT: CPT

## 2019-01-25 PROCEDURE — 6370000000 HC RX 637 (ALT 250 FOR IP): Performed by: NURSE PRACTITIONER

## 2019-01-25 PROCEDURE — 71046 X-RAY EXAM CHEST 2 VIEWS: CPT

## 2019-01-25 PROCEDURE — 94664 DEMO&/EVAL PT USE INHALER: CPT

## 2019-01-25 PROCEDURE — 85025 COMPLETE CBC W/AUTO DIFF WBC: CPT

## 2019-01-25 PROCEDURE — 80053 COMPREHEN METABOLIC PANEL: CPT

## 2019-01-25 PROCEDURE — 94760 N-INVAS EAR/PLS OXIMETRY 1: CPT

## 2019-01-25 PROCEDURE — 99283 EMERGENCY DEPT VISIT LOW MDM: CPT

## 2019-01-25 PROCEDURE — 6360000002 HC RX W HCPCS: Performed by: NURSE PRACTITIONER

## 2019-01-25 RX ORDER — ALBUTEROL SULFATE 2.5 MG/3ML
5 SOLUTION RESPIRATORY (INHALATION) ONCE
Status: COMPLETED | OUTPATIENT
Start: 2019-01-25 | End: 2019-01-25

## 2019-01-25 RX ORDER — PREDNISONE 10 MG/1
60 TABLET ORAL DAILY
Qty: 30 TABLET | Refills: 0 | Status: SHIPPED | OUTPATIENT
Start: 2019-01-25 | End: 2019-01-30

## 2019-01-25 RX ORDER — IPRATROPIUM BROMIDE AND ALBUTEROL SULFATE 2.5; .5 MG/3ML; MG/3ML
1 SOLUTION RESPIRATORY (INHALATION) ONCE
Status: COMPLETED | OUTPATIENT
Start: 2019-01-25 | End: 2019-01-25

## 2019-01-25 RX ORDER — PREDNISONE 20 MG/1
60 TABLET ORAL ONCE
Status: COMPLETED | OUTPATIENT
Start: 2019-01-25 | End: 2019-01-25

## 2019-01-25 RX ORDER — GUAIFENESIN/DEXTROMETHORPHAN 100-10MG/5
5 SYRUP ORAL 3 TIMES DAILY PRN
Qty: 120 ML | Refills: 0 | Status: SHIPPED | OUTPATIENT
Start: 2019-01-25 | End: 2019-02-04

## 2019-01-25 RX ADMIN — ALBUTEROL SULFATE 5 MG: 2.5 SOLUTION RESPIRATORY (INHALATION) at 19:23

## 2019-01-25 RX ADMIN — PREDNISONE 60 MG: 20 TABLET ORAL at 19:32

## 2019-01-25 RX ADMIN — IPRATROPIUM BROMIDE AND ALBUTEROL SULFATE 1 AMPULE: .5; 3 SOLUTION RESPIRATORY (INHALATION) at 19:23

## 2019-01-25 ASSESSMENT — ENCOUNTER SYMPTOMS
VOMITING: 0
SHORTNESS OF BREATH: 1
CHEST TIGHTNESS: 0
COUGH: 1
WHEEZING: 1
ABDOMINAL PAIN: 0
NAUSEA: 0
DIARRHEA: 0

## 2019-02-07 ENCOUNTER — OFFICE VISIT (OUTPATIENT)
Dept: PRIMARY CARE CLINIC | Age: 44
End: 2019-02-07
Payer: MEDICARE

## 2019-02-07 VITALS
WEIGHT: 250.2 LBS | TEMPERATURE: 97.6 F | DIASTOLIC BLOOD PRESSURE: 79 MMHG | RESPIRATION RATE: 18 BRPM | BODY MASS INDEX: 37.92 KG/M2 | SYSTOLIC BLOOD PRESSURE: 123 MMHG | HEART RATE: 90 BPM | OXYGEN SATURATION: 97 % | HEIGHT: 68 IN

## 2019-02-07 DIAGNOSIS — J45.30 MILD PERSISTENT ASTHMA WITHOUT COMPLICATION: Primary | ICD-10-CM

## 2019-02-07 PROCEDURE — G8484 FLU IMMUNIZE NO ADMIN: HCPCS | Performed by: INTERNAL MEDICINE

## 2019-02-07 PROCEDURE — G8417 CALC BMI ABV UP PARAM F/U: HCPCS | Performed by: INTERNAL MEDICINE

## 2019-02-07 PROCEDURE — 99213 OFFICE O/P EST LOW 20 MIN: CPT | Performed by: INTERNAL MEDICINE

## 2019-02-07 PROCEDURE — G8427 DOCREV CUR MEDS BY ELIG CLIN: HCPCS | Performed by: INTERNAL MEDICINE

## 2019-02-07 PROCEDURE — 1036F TOBACCO NON-USER: CPT | Performed by: INTERNAL MEDICINE

## 2019-02-07 RX ORDER — AMOXICILLIN AND CLAVULANATE POTASSIUM 875; 125 MG/1; MG/1
1 TABLET, FILM COATED ORAL 2 TIMES DAILY
Qty: 20 TABLET | Refills: 0 | Status: SHIPPED | OUTPATIENT
Start: 2019-02-07 | End: 2019-02-17

## 2019-02-07 RX ORDER — METHYLPREDNISOLONE 4 MG/1
TABLET ORAL
Qty: 1 KIT | Refills: 0 | Status: SHIPPED | OUTPATIENT
Start: 2019-02-07 | End: 2019-02-13

## 2019-02-07 RX ORDER — ALBUTEROL SULFATE 90 UG/1
2 AEROSOL, METERED RESPIRATORY (INHALATION) EVERY 6 HOURS PRN
Qty: 1 INHALER | Refills: 0 | Status: SHIPPED | OUTPATIENT
Start: 2019-02-07 | End: 2019-04-15 | Stop reason: SDUPTHER

## 2019-02-07 ASSESSMENT — ENCOUNTER SYMPTOMS
PHOTOPHOBIA: 0
EYE REDNESS: 0
GASTROINTESTINAL NEGATIVE: 1
EYE ITCHING: 0
EYE PAIN: 0
EYE DISCHARGE: 0
FREQUENT THROAT CLEARING: 1
WHEEZING: 1

## 2019-02-11 ENCOUNTER — TELEPHONE (OUTPATIENT)
Dept: PRIMARY CARE CLINIC | Age: 44
End: 2019-02-11

## 2019-02-11 RX ORDER — DEXTROMETHORPHAN POLISTIREX 30 MG/5ML
60 SUSPENSION ORAL 2 TIMES DAILY PRN
Qty: 148 ML | Refills: 0 | Status: SHIPPED | OUTPATIENT
Start: 2019-02-11 | End: 2019-02-21

## 2019-02-11 RX ORDER — BENZONATATE 100 MG/1
100 CAPSULE ORAL 2 TIMES DAILY PRN
Qty: 20 CAPSULE | Refills: 0 | Status: SHIPPED | OUTPATIENT
Start: 2019-02-11 | End: 2019-02-18

## 2019-02-26 ENCOUNTER — TELEPHONE (OUTPATIENT)
Dept: PRIMARY CARE CLINIC | Age: 44
End: 2019-02-26

## 2019-03-04 ENCOUNTER — OFFICE VISIT (OUTPATIENT)
Dept: PRIMARY CARE CLINIC | Age: 44
End: 2019-03-04
Payer: MEDICARE

## 2019-03-04 VITALS
RESPIRATION RATE: 12 BRPM | TEMPERATURE: 98.2 F | HEIGHT: 68 IN | SYSTOLIC BLOOD PRESSURE: 114 MMHG | OXYGEN SATURATION: 100 % | DIASTOLIC BLOOD PRESSURE: 74 MMHG | WEIGHT: 250 LBS | BODY MASS INDEX: 37.89 KG/M2 | HEART RATE: 97 BPM

## 2019-03-04 DIAGNOSIS — Z23 NEED FOR PNEUMOCOCCAL VACCINE: ICD-10-CM

## 2019-03-04 DIAGNOSIS — R05.3 CHRONIC COUGH: Primary | ICD-10-CM

## 2019-03-04 PROBLEM — J45.30 MILD PERSISTENT ASTHMA WITHOUT COMPLICATION: Status: RESOLVED | Noted: 2019-02-07 | Resolved: 2019-03-04

## 2019-03-04 PROCEDURE — 99213 OFFICE O/P EST LOW 20 MIN: CPT | Performed by: INTERNAL MEDICINE

## 2019-03-04 PROCEDURE — G8427 DOCREV CUR MEDS BY ELIG CLIN: HCPCS | Performed by: INTERNAL MEDICINE

## 2019-03-04 PROCEDURE — 90732 PPSV23 VACC 2 YRS+ SUBQ/IM: CPT | Performed by: INTERNAL MEDICINE

## 2019-03-04 PROCEDURE — G8484 FLU IMMUNIZE NO ADMIN: HCPCS | Performed by: INTERNAL MEDICINE

## 2019-03-04 PROCEDURE — G8417 CALC BMI ABV UP PARAM F/U: HCPCS | Performed by: INTERNAL MEDICINE

## 2019-03-04 PROCEDURE — 1036F TOBACCO NON-USER: CPT | Performed by: INTERNAL MEDICINE

## 2019-03-04 PROCEDURE — 90471 IMMUNIZATION ADMIN: CPT | Performed by: INTERNAL MEDICINE

## 2019-03-04 RX ORDER — PREDNISONE 20 MG/1
40 TABLET ORAL DAILY
Qty: 10 TABLET | Refills: 0 | Status: SHIPPED | OUTPATIENT
Start: 2019-03-04 | End: 2019-03-09

## 2019-03-04 RX ORDER — BENZONATATE 100 MG/1
100 CAPSULE ORAL 3 TIMES DAILY PRN
Qty: 30 CAPSULE | Refills: 0 | Status: SHIPPED | OUTPATIENT
Start: 2019-03-04 | End: 2019-03-11

## 2019-03-04 RX ORDER — CETIRIZINE HYDROCHLORIDE 10 MG/1
10 TABLET ORAL DAILY
Qty: 30 TABLET | Refills: 5 | Status: SHIPPED | OUTPATIENT
Start: 2019-03-04 | End: 2021-12-27

## 2019-03-04 ASSESSMENT — ENCOUNTER SYMPTOMS
ABDOMINAL PAIN: 0
SHORTNESS OF BREATH: 1
COUGH: 1
VOMITING: 0
CONSTIPATION: 0
NAUSEA: 0
DIARRHEA: 0

## 2019-03-04 ASSESSMENT — PATIENT HEALTH QUESTIONNAIRE - PHQ9
SUM OF ALL RESPONSES TO PHQ QUESTIONS 1-9: 0
SUM OF ALL RESPONSES TO PHQ9 QUESTIONS 1 & 2: 0
2. FEELING DOWN, DEPRESSED OR HOPELESS: 0
1. LITTLE INTEREST OR PLEASURE IN DOING THINGS: 0
SUM OF ALL RESPONSES TO PHQ QUESTIONS 1-9: 0

## 2019-04-03 ENCOUNTER — TELEPHONE (OUTPATIENT)
Dept: ORTHOPEDIC SURGERY | Age: 44
End: 2019-04-03

## 2019-04-03 NOTE — TELEPHONE ENCOUNTER
April from [facility] is calling   She wants to speak to someone regarding a med[employer] 14 for this patient?   Pls call April at [phone]

## 2019-04-15 ENCOUNTER — TELEPHONE (OUTPATIENT)
Dept: PRIMARY CARE CLINIC | Age: 44
End: 2019-04-15

## 2019-04-15 ENCOUNTER — OFFICE VISIT (OUTPATIENT)
Dept: PRIMARY CARE CLINIC | Age: 44
End: 2019-04-15
Payer: MEDICARE

## 2019-04-15 VITALS
DIASTOLIC BLOOD PRESSURE: 74 MMHG | BODY MASS INDEX: 37.89 KG/M2 | SYSTOLIC BLOOD PRESSURE: 110 MMHG | TEMPERATURE: 98.1 F | WEIGHT: 250 LBS | HEART RATE: 76 BPM | OXYGEN SATURATION: 99 % | RESPIRATION RATE: 12 BRPM | HEIGHT: 68 IN

## 2019-04-15 DIAGNOSIS — Z23 NEED FOR DIPHTHERIA-TETANUS-PERTUSSIS (TDAP) VACCINE: ICD-10-CM

## 2019-04-15 DIAGNOSIS — Z00.00 PREVENTATIVE HEALTH CARE: ICD-10-CM

## 2019-04-15 DIAGNOSIS — J45.30 MILD PERSISTENT ASTHMA WITHOUT COMPLICATION: Primary | ICD-10-CM

## 2019-04-15 PROCEDURE — G8417 CALC BMI ABV UP PARAM F/U: HCPCS | Performed by: INTERNAL MEDICINE

## 2019-04-15 PROCEDURE — 1036F TOBACCO NON-USER: CPT | Performed by: INTERNAL MEDICINE

## 2019-04-15 PROCEDURE — G8427 DOCREV CUR MEDS BY ELIG CLIN: HCPCS | Performed by: INTERNAL MEDICINE

## 2019-04-15 PROCEDURE — 99213 OFFICE O/P EST LOW 20 MIN: CPT | Performed by: INTERNAL MEDICINE

## 2019-04-15 RX ORDER — ALBUTEROL SULFATE 90 UG/1
2 AEROSOL, METERED RESPIRATORY (INHALATION) EVERY 6 HOURS PRN
Qty: 1 INHALER | Refills: 3 | Status: SHIPPED | OUTPATIENT
Start: 2019-04-15 | End: 2020-02-21 | Stop reason: SDUPTHER

## 2019-04-15 RX ORDER — ALBUTEROL SULFATE 2.5 MG/3ML
2.5 SOLUTION RESPIRATORY (INHALATION) EVERY 6 HOURS PRN
Qty: 120 EACH | Refills: 3 | Status: SHIPPED | OUTPATIENT
Start: 2019-04-15

## 2019-04-15 RX ORDER — BUDESONIDE AND FORMOTEROL FUMARATE DIHYDRATE 160; 4.5 UG/1; UG/1
2 AEROSOL RESPIRATORY (INHALATION) 2 TIMES DAILY
Qty: 1 INHALER | Refills: 1 | Status: SHIPPED | OUTPATIENT
Start: 2019-04-15 | End: 2020-02-21 | Stop reason: SDUPTHER

## 2019-04-15 ASSESSMENT — ENCOUNTER SYMPTOMS
VOMITING: 0
NAUSEA: 0
CONSTIPATION: 0
SHORTNESS OF BREATH: 0
COUGH: 0
DIARRHEA: 0
ABDOMINAL PAIN: 0

## 2019-04-15 NOTE — TELEPHONE ENCOUNTER
Dr. Farhat Llanos I called this pharmacy to confirm they DO carry this DME and supply tubing. We can fax over a hard copy of his re written prescription to their fax #: [phone]!

## 2019-04-15 NOTE — TELEPHONE ENCOUNTER
Patient just received prescription today for a Respiratory Therapy kit and he states that his current pharmacy on [address] does not carry the units themselves. He said that they told him he can get them at the [facility] [phone]. Patient needs script sent to new pharmacy for the units please. Thank you.

## 2019-04-15 NOTE — PATIENT INSTRUCTIONS
Patient Education        Learning About Asthma Triggers  What are asthma triggers? When you have asthma, certain things can make your symptoms worse. These are called triggers. Learn what triggers an asthma attack for you, and avoid the triggers when you can. Common triggers include colds, smoke, air pollution, dust, pollen, pets, stress, and cold air. How do asthma triggers affect you? Triggers can make it harder for your lungs to work as they should. They can lead to sudden breathing problems and other symptoms. When you are around a trigger, an asthma attack is more likely. If your symptoms are severe, you may need emergency treatment or have to go to the hospital for treatment. What can you do to avoid triggers? The first thing is to know your triggers. When you are having symptoms, note the things around you that might be causing them. Then look for patterns that may be triggering your symptoms. Record your triggers on a piece of paper or in an asthma diary. When you have your list of possible triggers, work with your doctor to find ways to avoid them. Avoid colds and flu. Get a pneumococcal vaccine shot. If you have had one before, ask your doctor whether you need a second dose. Get a flu vaccine every year, as soon as it's available. If you must be around people with colds or the flu, wash your hands often. Here are some ways to avoid a few common triggers. · Do not smoke or allow others to smoke around you. If you need help quitting, talk to your doctor about stop-smoking programs and medicines. These can increase your chances of quitting for good. · If there is a lot of pollution, pollen, or dust outside, stay at home and keep your windows closed. Use an air conditioner or air filter in your home. Check your local weather report or newspaper for air quality and pollen reports. What else should you know? · Take your controller medicine every day, not just when you have symptoms.  It helps prevent problems before they occur. · Your doctor may suggest that you check how well your lungs are working by measuring your peak expiratory flow (PEF) throughout the day. Your PEF may drop when you are near things that trigger symptoms. Where can you learn more? Go to https://chperonnie.PeopleLinx. org and sign in to your [employer] account. Enter A956 in the MediaMath box to learn more about \"Learning About Asthma Triggers. \"     If you do not have an account, please click on the \"Sign Up Now\" link. Current as of: September 5, 2018  Content Version: 11.9  © 9026-2439 [employer]. Care instructions adapted under license by [facility] ([facility]). If you have questions about a medical condition or this instruction, always ask your healthcare professional. [address] any warranty or liability for your use of this information. Patient Education        Learning About Nebulizers  What is a nebulizer? A nebulizer is a tool that delivers liquid medicine as a fine mist. You breathe in the medicine through a mouthpiece or face mask. This sends the medicine directly to your airways and lungs. You breathe in the medicine for a few minutes. What is it used for? A nebulizer may be used to treat respiratory problems. These include asthma and chronic obstructive pulmonary disease (COPD). If you have asthma, it can be used with daily controller medicines or with quick-relief medicine during an attack or flare-up. A nebulizer can make inhaling medicines easier. It may be very helpful if it is hard for you to breathe or use an inhaler. How do you use a nebulizer? 1. Put the medicine into the medicine container. Be sure to measure the right amount. 2. Make sure that the container is connected to the mouthpiece or face mask. 3. Turn on the air compressor. 4. Take deep, slow breaths through the mouthpiece or mask. Hold each breath for about 2 seconds.   5. Continue breathing until the medicine is gone from the container. When the medicine is gone, there will be no more mist coming out. This may take about 10 minutes. 6. Shake the container to make sure you get all the medicine. Where can you learn more? Go to https://chyasireb.sarvaMAIL. org and sign in to your [employer] account. Enter O621 in the BuildOut box to learn more about \"Learning About Nebulizers. \"     If you do not have an account, please click on the \"Sign Up Now\" link. Current as of: September 5, 2018  Content Version: 11.9  © 8189-3929 [employer]. Care instructions adapted under license by [facility] ([facility]). If you have questions about a medical condition or this instruction, always ask your healthcare professional. [address] any warranty or liability for your use of this information.

## 2019-04-15 NOTE — PROGRESS NOTES
Chief Complaint   Patient presents with    Asthma     2 mo follow up     Other     requesting neublizer for home treatment. HPI:  Pablo Dean luanne 44 y.o. male, with a history of asthma, who presents for a follow up visit. Asthma: Mr. Salomon Joel asthma is much better controlled. He estimates he has 1-2 coughs per day., but nothing sustained like before. He denies any wheezing or shortness of breath. Pt says he rarely takes his albuterol. However, he requests a nebulizer just in case his asthma flares up. Current Outpatient Medications   Medication Sig Dispense Refill    cetirizine (ZYRTEC) 10 MG tablet Take 1 tablet by mouth daily 30 tablet 5    albuterol sulfate  (90 Base) MCG/ACT inhaler Inhale 2 puffs into the lungs every 6 hours as needed for Wheezing 1 Inhaler 0    sucralfate (CARAFATE) 1 GM tablet Take 1 tablet by mouth 4 times daily 120 tablet 5    budesonide-formoterol (SYMBICORT) 160-4.5 MCG/ACT AERO Inhale 2 puffs into the lungs 2 times daily 1 Inhaler 1     No current facility-administered medications for this visit. No Known Allergies    Review of Systems   Constitutional: Negative for chills, fatigue and fever. Eyes: Negative for visual disturbance. Respiratory: Negative for cough and shortness of breath. Cardiovascular: Negative for chest pain and leg swelling. Gastrointestinal: Negative for abdominal pain, constipation, diarrhea, nausea and vomiting. Skin: Negative for rash. Neurological: Negative for dizziness, light-headedness and headaches. Psychiatric/Behavioral: Negative for dysphoric mood. The patient is not nervous/anxious. Vitals:    04/15/19 1040   BP: 110/74   Pulse: 76   Resp: 12   Temp: 98.1 °F (36.7 °C)   SpO2: 99%   Weight: 250 lb (113.4 kg)   Height: 5' 8\" (1.727 m)       Physical Exam   Constitutional: He is oriented to person, place, and time. He appears well-developed and well-nourished. No distress.    HENT: Head: Normocephalic and atraumatic. Nose: Nose normal.   Mouth/Throat: No oropharyngeal exudate. Eyes: Pupils are equal, round, and reactive to light. Conjunctivae and EOM are normal. Right eye exhibits no discharge. Left eye exhibits no discharge. Neck: Normal range of motion. Neck supple. Cardiovascular: Normal rate, regular rhythm and normal heart sounds. Exam reveals no gallop and no friction rub. No murmur heard. Pulmonary/Chest: Effort normal and breath sounds normal. No respiratory distress. He has no wheezes. Abdominal: Soft. Bowel sounds are normal. He exhibits no distension. There is no tenderness. There is no rebound. Musculoskeletal: Normal range of motion. He exhibits no edema or tenderness. Neurological: He is alert and oriented to person, place, and time. He has normal reflexes. No cranial nerve deficit. Skin: Skin is warm and dry. No rash noted. He is not diaphoretic. No erythema. Psychiatric: He has a normal mood and affect. His behavior is normal.   Vitals reviewed. Assessment:  Leonid Singh is a 44 y.o. male, with a history of asthma, who presents for a follow up visit. Plan:       1. Mild persistent asthma without complication    - budesonide-formoterol (SYMBICORT) 160-4.5 MCG/ACT AERO; Inhale 2 puffs into the lungs 2 times daily  Dispense: 1 Inhaler; Refill: 1  - albuterol (PROVENTIL) (2.5 MG/3ML) 0.083% nebulizer solution; Take 3 mLs by nebulization every 6 hours as needed for Wheezing  Dispense: 120 each; Refill: 3  - albuterol sulfate  (90 Base) MCG/ACT inhaler; Inhale 2 puffs into the lungs every 6 hours as needed for Wheezing  Dispense: 1 Inhaler; Refill: 3    2. Preventative health care:  Needs yearly labs     - CBC Auto Differential; Future  - Hemoglobin A1C; Future  - Hepatic Function Panel; Future  - HIV Screen; Future  - Lipid Panel; Future  - Renal Function Panel; Future  - TSH with Reflex; Future    3.  Need for diphtheria-tetanus-pertussis (Tdap) vaccine    - gave pt prescription to get at local pharmacy:  Tdap (ADACEL) 5-2-15.5 LF-MCG/0.5 injection; Inject 0.5 mLs into the muscle once for 1 dose  Dispense: 0.5 mL; Refill: 0        Return in about 6 months (around 10/15/2019) for asthma .

## 2019-04-17 ENCOUNTER — TELEPHONE (OUTPATIENT)
Dept: PRIMARY CARE CLINIC | Age: 44
End: 2019-04-17

## 2019-04-22 ENCOUNTER — TELEPHONE (OUTPATIENT)
Dept: PRIMARY CARE CLINIC | Age: 44
End: 2019-04-22

## 2019-04-24 ENCOUNTER — TELEPHONE (OUTPATIENT)
Dept: ORTHOPEDIC SURGERY | Age: 44
End: 2019-04-24

## 2019-04-24 NOTE — TELEPHONE ENCOUNTER
Called and informed pt that we have sent his [employer] to his . His release date to work was 1/18/19. Informed pt that we received a request for records that will have to go through our records release which may take a day or two to process. Pt understood.

## 2019-04-29 ENCOUNTER — TELEPHONE (OUTPATIENT)
Dept: ORTHOPEDIC SURGERY | Age: 44
End: 2019-04-29

## 2019-04-29 NOTE — TELEPHONE ENCOUNTER
Patient stopped in office regarding paperwork for his  and [facility] Claim. Would like call back.  Patient can be reached [phone]

## 2019-05-02 ENCOUNTER — TELEPHONE (OUTPATIENT)
Dept: PRIMARY CARE CLINIC | Age: 44
End: 2019-05-02

## 2019-05-06 ENCOUNTER — TELEPHONE (OUTPATIENT)
Dept: PRIMARY CARE CLINIC | Age: 44
End: 2019-05-06

## 2019-05-07 DIAGNOSIS — J45.30 MILD PERSISTENT ASTHMA WITHOUT COMPLICATION: Primary | ICD-10-CM

## 2019-05-22 ENCOUNTER — OFFICE VISIT (OUTPATIENT)
Dept: ORTHOPEDIC SURGERY | Age: 44
End: 2019-05-22
Payer: COMMERCIAL

## 2019-05-22 VITALS
WEIGHT: 254 LBS | DIASTOLIC BLOOD PRESSURE: 76 MMHG | HEART RATE: 89 BPM | HEIGHT: 68 IN | SYSTOLIC BLOOD PRESSURE: 127 MMHG | BODY MASS INDEX: 38.49 KG/M2

## 2019-05-22 DIAGNOSIS — S96.812D RUPTURE OF LEFT PLANTARIS TENDON, SUBSEQUENT ENCOUNTER: Primary | ICD-10-CM

## 2019-05-22 DIAGNOSIS — S86.112D RUPTURE OF LEFT GASTROCNEMIUS TENDON, SUBSEQUENT ENCOUNTER: ICD-10-CM

## 2019-05-22 PROCEDURE — 99213 OFFICE O/P EST LOW 20 MIN: CPT | Performed by: PHYSICIAN ASSISTANT

## 2019-05-22 NOTE — PROGRESS NOTES
Patient Name: Peter Solomon  Medical Record Number: [id number]  YOB: 1975  Date of Encounter: 5/22/2019     Chief Complaint   Patient presents with    Leg Pain     [facility]; f/u for LT plantaris tendon rupture. History of Present Illness:   Mr. Peter Solomon is here in 8 month follow up regarding his left plantaris and gastrocnemius tear during a work injury on 9/19/2018. patient presents today stating he can still have left calf soreness that he rates up to 4/10 with excessive walking or standing. He states he is still doing some of the exercises he was given by physical therapy. He denies pain in the left knee or ankle. He denies numbness or tingling in the left leg. The patient's past medical history, medications, allergies, family history, social history, and review of systems have been reviewed, and dated and are recorded in the chart under the 'MEDIA\" tab. Physical Exam:    Mr. Peter Solomon appears well, he is in no apparent distress, he demonstrates appropriate mood & affect. He is alert and oriented to person, place and time. /76   Pulse 89   Ht 5' 8\" (1.727 m)   Wt 254 lb (115.2 kg)   BMI 38.62 kg/m²     On examination of patient's left lower leg there is no swelling. There is no bruising. There is no palpable defect of the gastroc or plantaris. He has full range of motion of the left knee and ankle with 5/5 motor strength. He is ambulating without antalgic gait. Impression:   Diagnosis Orders   1. Rupture of left plantaris tendon, subsequent encounter     2. Rupture of left gastrocnemius tendon, subsequent encounter         Treatment Plan:    I believe patient's residual soreness is likely from gastrocnemius tightness secondary to many months of immobilization. He admits to still doing some of the home exercises as instructed by physical therapy. He will continue working more aggressively on stretching and strengthening.   I do not believe any other acute intervention is warranted at this time. He will follow-up in 8 weeks if he is still having pain. Phoebe Rodgers was informed of the results of any imaging. We discussed treatment options and a time was given to answer questions. A plan was proposed and Phoebe Rodgers understand and accepts this course of care. Electronically signed by Maria L Robert PA-C on 0/92/1288  Board Certified [facility]    Please note that portions of this note were completed with a voice recognition program.  Efforts were made to edit the dictations but occasionally words are mis-transcribed.

## 2019-06-04 ENCOUNTER — TELEPHONE (OUTPATIENT)
Dept: ORTHOPEDIC SURGERY | Age: 44
End: 2019-06-04

## 2019-06-04 NOTE — TELEPHONE ENCOUNTER
Spoke to April and she received a no records report from [facility] for office notes requested for Dr Nikolay Morrow after October 2018. I called [employer] and then informed April that because patient saw his PA Luba Yates for those visit and they would need to send a new release with Aki's name on it in order to obtain those records. She verbalized understanding .

## 2019-06-10 ENCOUNTER — TELEPHONE (OUTPATIENT)
Dept: ORTHOPEDIC SURGERY | Age: 44
End: 2019-06-10

## 2019-07-23 ENCOUNTER — TELEPHONE (OUTPATIENT)
Dept: ORTHOPEDIC SURGERY | Age: 44
End: 2019-07-23

## 2019-08-28 ENCOUNTER — OFFICE VISIT (OUTPATIENT)
Dept: ORTHOPEDIC SURGERY | Age: 44
End: 2019-08-28
Payer: COMMERCIAL

## 2019-08-28 VITALS
DIASTOLIC BLOOD PRESSURE: 78 MMHG | WEIGHT: 251.8 LBS | BODY MASS INDEX: 38.16 KG/M2 | SYSTOLIC BLOOD PRESSURE: 117 MMHG | HEART RATE: 96 BPM | HEIGHT: 68 IN

## 2019-08-28 DIAGNOSIS — S96.812D RUPTURE OF LEFT PLANTARIS TENDON, SUBSEQUENT ENCOUNTER: ICD-10-CM

## 2019-08-28 DIAGNOSIS — S86.112D RUPTURE OF LEFT GASTROCNEMIUS TENDON, SUBSEQUENT ENCOUNTER: Primary | ICD-10-CM

## 2019-08-28 PROCEDURE — 99213 OFFICE O/P EST LOW 20 MIN: CPT | Performed by: PHYSICIAN ASSISTANT

## 2019-08-28 NOTE — PROGRESS NOTES
Patient Name: Kenneth Del Rosario  Medical Record Number: [id number]  YOB: 1975  Date of Encounter: 8/28/2019     Chief Complaint   Patient presents with    Follow-up     f/u left calf       History of Present Illness:   Mr. Kenneth Del Rosario is here in almost 1 year follow up regarding his left plantaris and gastrocnemius tear during a work injury on 9/19/2018. Patient completed 24 physical therapy visits. He has tried rest, ice, heat, anti-inflammatories and activity modification. He presents today stating he is still having pain he rates up to a 4/10 with walking, standing or going from sitting to standing. He states most of his pain is along the medial aspect of his mid calf. He denies pain in the left knee or ankle. He denies any new injury. He is currently seeking employment. The patient's past medical history, medications, allergies, family history, social history, and review of systems have been reviewed, and dated and are recorded in the chart under the 'MEDIA\" tab. Physical Exam:    Mr. Kenneth Del Rosario appears well, he is in no apparent distress, he demonstrates appropriate mood & affect. He is alert and oriented to person, place and time. /78   Pulse 96   Ht 5' 8\" (1.727 m)   Wt 251 lb 12.8 oz (114.2 kg)   BMI 38.29 kg/m²     On examination of patient's left lower leg there is no acute swelling. He does have tenderness on palpation of the mid calf, mostly along the medial portion. He has pain with dorsiflexion of the ankle. He denies range of motion testing of the left knee. There is no lower extremity edema or signs of DVT. Orders:  Orders Placed This Encounter   Procedures   Colin Munoz MD, Orthopedic Surgery, [facility]       Impression:   Diagnosis Orders   1. Rupture of left gastrocnemius tendon, subsequent encounter  DonastArturo bearden MD, Orthopedic Surgery, [facility]   2.  Rupture of left plantaris tendon,

## 2019-09-03 ENCOUNTER — TELEPHONE (OUTPATIENT)
Dept: ORTHOPEDIC SURGERY | Age: 44
End: 2019-09-03

## 2019-09-03 NOTE — TELEPHONE ENCOUNTER
Called and spoke w/pt. Pt stated he was supposed to have an appointment w/Dr. Leno Fall. I gave pt office contact information and informed him that he would need to call that number in order to make his appointment. Also if he needed anything else to give a call back.

## 2019-09-08 ENCOUNTER — HOSPITAL ENCOUNTER (EMERGENCY)
Age: 44
Discharge: HOME | End: 2019-09-08
Attending: EMERGENCY MEDICINE
Payer: MEDICARE

## 2019-09-08 VITALS
BODY MASS INDEX: 37.89 KG/M2 | RESPIRATION RATE: 14 BRPM | HEIGHT: 68 IN | SYSTOLIC BLOOD PRESSURE: 112 MMHG | HEART RATE: 72 BPM | TEMPERATURE: 97.2 F | WEIGHT: 250 LBS | OXYGEN SATURATION: 98 % | DIASTOLIC BLOOD PRESSURE: 74 MMHG

## 2019-09-08 DIAGNOSIS — M79.662 PAIN OF LEFT CALF: Primary | ICD-10-CM

## 2019-09-08 DIAGNOSIS — R79.89 ELEVATED D-DIMER: ICD-10-CM

## 2019-09-08 LAB
ANION GAP SERPL CALCULATED.3IONS-SCNC: 10 MMOL/L (ref 3–16)
APTT: 31.2 SEC (ref 26–36)
BASOPHILS ABSOLUTE: 0.1 K/UL (ref 0–0.2)
BASOPHILS RELATIVE PERCENT: 1 %
BUN BLDV-MCNC: 12 MG/DL (ref 7–20)
CALCIUM SERPL-MCNC: 9.2 MG/DL (ref 8.3–10.6)
CHLORIDE BLD-SCNC: 107 MMOL/L (ref 99–110)
CO2: 24 MMOL/L (ref 21–32)
CREAT SERPL-MCNC: 1.1 MG/DL (ref 0.9–1.3)
D DIMER: 1048 NG/ML DDU (ref 0–229)
EOSINOPHILS ABSOLUTE: 0.4 K/UL (ref 0–0.6)
EOSINOPHILS RELATIVE PERCENT: 4.3 %
GFR AFRICAN AMERICAN: >60
GFR NON-AFRICAN AMERICAN: >60
GLUCOSE BLD-MCNC: 89 MG/DL (ref 70–99)
HCT VFR BLD CALC: 41.9 % (ref 40.5–52.5)
HEMOGLOBIN: 14 G/DL (ref 13.5–17.5)
INR BLD: 1.03 (ref 0.86–1.14)
LYMPHOCYTES ABSOLUTE: 2.1 K/UL (ref 1–5.1)
LYMPHOCYTES RELATIVE PERCENT: 23.4 %
MCH RBC QN AUTO: 31 PG (ref 26–34)
MCHC RBC AUTO-ENTMCNC: 33.3 G/DL (ref 31–36)
MCV RBC AUTO: 93.1 FL (ref 80–100)
MONOCYTES ABSOLUTE: 0.6 K/UL (ref 0–1.3)
MONOCYTES RELATIVE PERCENT: 7.3 %
NEUTROPHILS ABSOLUTE: 5.7 K/UL (ref 1.7–7.7)
NEUTROPHILS RELATIVE PERCENT: 64 %
PDW BLD-RTO: 14.6 % (ref 12.4–15.4)
PLATELET # BLD: 268 K/UL (ref 135–450)
PMV BLD AUTO: 8.5 FL (ref 5–10.5)
POTASSIUM SERPL-SCNC: 3.7 MMOL/L (ref 3.5–5.1)
PROTHROMBIN TIME: 11.7 SEC (ref 9.8–13)
RBC # BLD: 4.5 M/UL (ref 4.2–5.9)
SODIUM BLD-SCNC: 141 MMOL/L (ref 136–145)
WBC # BLD: 8.9 K/UL (ref 4–11)

## 2019-09-08 PROCEDURE — 6370000000 HC RX 637 (ALT 250 FOR IP): Performed by: EMERGENCY MEDICINE

## 2019-09-08 PROCEDURE — 85730 THROMBOPLASTIN TIME PARTIAL: CPT

## 2019-09-08 PROCEDURE — 85610 PROTHROMBIN TIME: CPT

## 2019-09-08 PROCEDURE — 85025 COMPLETE CBC W/AUTO DIFF WBC: CPT

## 2019-09-08 PROCEDURE — 99283 EMERGENCY DEPT VISIT LOW MDM: CPT

## 2019-09-08 PROCEDURE — 85379 FIBRIN DEGRADATION QUANT: CPT

## 2019-09-08 PROCEDURE — 80048 BASIC METABOLIC PNL TOTAL CA: CPT

## 2019-09-08 RX ORDER — TRAMADOL HYDROCHLORIDE 50 MG/1
50 TABLET ORAL ONCE
Status: COMPLETED | OUTPATIENT
Start: 2019-09-08 | End: 2019-09-08

## 2019-09-08 RX ADMIN — TRAMADOL HYDROCHLORIDE 50 MG: 50 TABLET, FILM COATED ORAL at 14:45

## 2019-09-08 RX ADMIN — APIXABAN 10 MG: 5 TABLET, FILM COATED ORAL at 16:01

## 2019-09-08 ASSESSMENT — PAIN DESCRIPTION - LOCATION: LOCATION: LEG

## 2019-09-08 ASSESSMENT — PAIN DESCRIPTION - PAIN TYPE: TYPE: ACUTE PAIN

## 2019-09-08 ASSESSMENT — PAIN SCALES - GENERAL: PAINLEVEL_OUTOF10: 4

## 2019-09-08 NOTE — ED PROVIDER NOTES
Inhaler, R-3Normal      cetirizine (ZYRTEC) 10 MG tablet Take 1 tablet by mouth daily, Disp-30 tablet, R-5Normal      sucralfate (CARAFATE) 1 GM tablet Take 1 tablet by mouth 4 times daily, Disp-120 tablet, R-5Normal      Respiratory Therapy Supplies (NEBULIZER COMPRESSOR) KIT ONCE Starting Tue 5/7/2019, For 1 dose, Disp-1 kit, R-0, Print           Social history:  reports that he has never smoked. He has never used smokeless tobacco. He reports that he drinks alcohol. He reports that he does not use drugs. Family history:    Family History   Problem Relation Age of Onset    Seizures Father     Diabetes Brother        REVIEW OF SYSTEMS  10 systems reviewed, pertinent positives per HPI otherwise noted to be negative    PHYSICAL EXAM  /74   Pulse 72   Temp 97.2 °F (36.2 °C)   Resp 14   Ht 5' 8\" (1.727 m)   Wt 250 lb (113.4 kg)   SpO2 98%   BMI 38.01 kg/m²   GENERAL APPEARANCE: Awake and alert. Cooperative. No acute distress. HEAD: Normocephalic. Atraumatic. EYES: Anicteric sclera. EOM grossly intact. ENT: Mucous membranes are moist.   CV: Brisk capillary refill. Distally in the affected leg. +2 posterior tibial pulse in the left ankle  LUNGS: Breathing is unlabored. Speaking comfortably in full sentences. EXTREMITIES: MAEE. No acute deformities. Tender to the left calf without palpable or visible edema in the left lower leg. No pitting edema in either leg. SKIN: Warm and dry. NEUROLOGICAL: Alert and oriented. Normal coordination.         LABS  Labs Reviewed   D-DIMER, QUANTITATIVE - Abnormal; Notable for the following components:       Result Value    D-Dimer, Quant 1048 (*)     All other components within normal limits    Narrative:     Performed at:  [facility] [address], [place], [address] [place] [address] Drive   Phone [phone]   CBC WITH AUTO DIFFERENTIAL    Narrative:     Performed at:  [facility] [address], [place], [place] [address]   Phone [phone]   BASIC METABOLIC PANEL    Narrative:     Performed at:  [facility]  [address] [facility], [address] [facility]   Phone [phone]   PROTIME-INR    Narrative:     Performed at:  [facility]  [address] [facility], [address] [facility]   Phone [phone]   APTT    Narrative:     Performed at:  [facility]  [address] [facility], [address] [facility]   Phone [phone]       ED COURSE/MDM  Patient seen and evaluated. We do not have vascular ultrasound available on the weekends, so I ordered a d-dimer instead. The patient's d-dimer was elevated, so I was not able to rule out DVT with this alone. I discussed this with the patient, including my concern and the options for treatment and evaluation. After discussing this with me as well as the risks, benefits, alternatives, patient decided to have the outpatient ultrasound performed and started in a coagulation today with a dose of Eliquis. I gave him a starter pack and wrote for the outpatient vascular ultrasound. The patient will follow-up to have this performed. I am also referring him back to his primary care physician after the ultrasound is completed for further management. I do feel patient can be safely discharged to home. Patient expresses understanding and is in agreement with plan.      I have considered the following diagnoses in my evaluation of the patient: [address], DEEP VENOUS THROMBOSIS, NECROTIZING FASCIITIS, SEVERE CELLULITIS, MAJOR FRACTURE, OSTEOMYELITIS, SEPTIC ARTHRITIS, TENDON INJURY, NEUROVASCULAR INJURY, DISLOCATION      Patient Referrals:  [facility] Emergency Department  [address] [facility] [address]  [phone]    As needed, If symptoms worsen or new symptoms develop    [facility] Vascular Lab  [facility] [address]  [phone]  Schedule an appointment as soon as possible for a visit   for outpatient ultrasound for possible blood clot in the leg    Aleyda Watters, [facility]  [phone]    In 4 days  Schedule as follow up after ultrasound      Discharge Medications:  Discharge Medication List as of 9/8/2019  4:59 PM      START taking these medications    Details   apixaban (ELIQUIS STARTER PACK) 5 MG TABS tablet Take 10 mg (2 tablets) orally twice daily for 7 days, then take 5 mg (1 tablet) orally twice daily thereafter., Disp-74 tablet, R-0Print             FINAL IMPRESSION  1. Pain of left calf    2. Elevated d-dimer        Blood pressure 112/74, pulse 72, temperature 97.2 °F (36.2 °C), resp. rate 14, height 5' 8\" (1.727 m), weight 250 lb (113.4 kg), SpO2 98 %. DISPOSITION  Patient was discharged to home in good condition. This chart was generated using the [address] [place], [place] [address]Th  dictation system. I created this record but it may contain dictation errors given the limitations of this technology.        Derik Mills MD  09/18/19 3276 Gene Patricio MD  09/18/19 [id number]

## 2019-09-09 ENCOUNTER — HOSPITAL ENCOUNTER (OUTPATIENT)
Dept: VASCULAR LAB | Age: 44
Discharge: HOME | End: 2019-09-09
Payer: MEDICARE

## 2019-09-09 DIAGNOSIS — M79.662 PAIN OF LEFT CALF: ICD-10-CM

## 2019-09-09 PROCEDURE — 93971 EXTREMITY STUDY: CPT

## 2019-09-10 ENCOUNTER — OFFICE VISIT (OUTPATIENT)
Dept: ORTHOPEDIC SURGERY | Age: 44
End: 2019-09-10
Payer: COMMERCIAL

## 2019-09-10 VITALS — DIASTOLIC BLOOD PRESSURE: 80 MMHG | SYSTOLIC BLOOD PRESSURE: 113 MMHG | HEART RATE: 73 BPM

## 2019-09-10 DIAGNOSIS — S86.112D RUPTURE OF LEFT GASTROCNEMIUS TENDON, SUBSEQUENT ENCOUNTER: Primary | ICD-10-CM

## 2019-09-10 PROCEDURE — G8428 CUR MEDS NOT DOCUMENT: HCPCS | Performed by: INTERNAL MEDICINE

## 2019-09-10 PROCEDURE — 99243 OFF/OP CNSLTJ NEW/EST LOW 30: CPT | Performed by: INTERNAL MEDICINE

## 2019-09-10 PROCEDURE — G8417 CALC BMI ABV UP PARAM F/U: HCPCS | Performed by: INTERNAL MEDICINE

## 2019-09-10 NOTE — PROGRESS NOTES
ankle      Special Tests: Negative Velasco test      Skin: There are no rashes, ulcerations or lesions. Gait: Nonantalgic      Reflex intact lower     Additional Comments:        Additional Examinations:           Right Lower Extremity: Examination of the right lower extremity does not show any tenderness, deformity or injury. Range of motion is unremarkable. There is no gross instability. There are no rashes, ulcerations or lesions. Strength and tone are normal.   Neurolgic -Light touch sensation and manual muscle testing normal L2-S1. No fasiculations. Pattella tendon and Achilles tendon reflexes +2 bilaterally. Seated SLR negative        Office Imaging Results/Procedures PerformedToday:     Limited ultrasound evaluation-left lower extremity  Logically ultrasound  10 MHz    The patient was positioned prone on examination table foot and ankle off the edge. Linear transducer was utilized in the medial gastrocnemius muscle was evaluated extensively long axis and short axis and its inferior distribution at the distal myotendinous junction. There were discrete anechoic areas within the muscle at the myotendinous junction consistent with tear/high-grade strain. There was no considerable retraction evident sonographically. Only mild gapping was noted at these regions with dynamic stressing. The more proximal region of the muscle belly was noted to have a normal sonographic appearance as did the lateral gastrocnemius muscle at the distal myotendinous junction. The Achilles tendon was noted to be intact distal to the myotendinous junction. There was no evidence of intra-muscular hematoma. There was tenderness to sono palpation involving the gastrocnemius at the distal myotendinous junction.        Office Procedures:     Orders Placed This Encounter   Procedures    US EXTREMITY LEFT NON VASC LIMITED           Other Outside Imaging and Testing Personally Reviewed:    Vl Extremity Venous yes   Location of test [facility]   Best days of the week Any   Best times of the day  morning   Best phone number to reach patient [phone]   Reason for exam:->Left gastrocnemius injury   Ordering Physician Provided Reason for Exam: PT STS HE HAD NO INJURY TO HIS   LEFT TIB/FPT C/O SWELLING AND PAIN LEFT MID-CALF AREA MEDIAL ASPECT (PUT GEL   CAP IN APPROX AREA) FOR 3 WEEKS   Acuity: Acute   Type of Exam: Initial   Additional signs and symptoms: PT STS HE HAD NO INJURY TO HIS LEFT TIB/FPT   C/O SWELLING AND PAIN LEFT MID-CALF AREA MEDIAL ASPECT (PUT GEL CAP IN APPROX   AREA) FOR 3 WEEKS   Relevant Medical/Surgical History: PT STS HE HAD NO INJURY TO HIS LEFT   TIB/FPT C/O SWELLING AND PAIN LEFT MID-CALF AREA MEDIAL ASPECT (PUT GEL CAP   IN APPROX AREA) FOR 3 WEEKS       78-year-old male with swelling and pain at the left mid calf area medially;   left gastrocnemius muscle injury       FINDINGS:   SOFT TISSUES: There is heterogeneous irregular low STIR and surrounding high   STIR signal along the medial aspect of the medial head of the gastrocnemius   muscle with irregular low T2 and high T2 signal along the inferior medial   head of the gastrocnemius musculotendinous junction with slight elevation of   the inferior aspect of the medial head of the gastrocnemius muscle measuring   1.9 cm on image 19, series 5.  This is consistent with a tear of the   myotendinous junction of the inferior medial head of the gastrocnemius   muscle.  Given the fluid and heterogeneous signal between the medial head of   the gastrocnemius and soleus muscles on coronal STIR image 20, series 5,   concurrent plantaris tendon tear is difficult to entirely exclude.       Soft tissue marker overlying the anteromedial aspect of the left upper leg.       Mild edema in the subcutaneous fat about the left leg, more so than the right.       BONE MARROW: Tibia and fibula appear grossly intact.  Bone marrow signal   intensity within the visualized

## 2019-09-23 ENCOUNTER — TELEPHONE (OUTPATIENT)
Dept: ORTHOPEDIC SURGERY | Age: 44
End: 2019-09-23

## 2019-09-23 DIAGNOSIS — S86.112D RUPTURE OF LEFT GASTROCNEMIUS TENDON, SUBSEQUENT ENCOUNTER: Primary | ICD-10-CM

## 2019-10-08 ENCOUNTER — TELEPHONE (OUTPATIENT)
Dept: ORTHOPEDIC SURGERY | Age: 44
End: 2019-10-08

## 2019-10-21 ENCOUNTER — OFFICE VISIT (OUTPATIENT)
Dept: ORTHOPEDIC SURGERY | Age: 44
End: 2019-10-21
Payer: MEDICARE

## 2019-10-21 VITALS — WEIGHT: 253 LBS | HEIGHT: 68 IN | BODY MASS INDEX: 38.34 KG/M2

## 2019-10-21 DIAGNOSIS — S93.492A SPRAIN OF ANTERIOR TALOFIBULAR LIGAMENT OF LEFT ANKLE, INITIAL ENCOUNTER: Primary | ICD-10-CM

## 2019-10-21 DIAGNOSIS — S93.602A FOOT SPRAIN, LEFT, INITIAL ENCOUNTER: ICD-10-CM

## 2019-10-21 PROCEDURE — G8427 DOCREV CUR MEDS BY ELIG CLIN: HCPCS | Performed by: PHYSICIAN ASSISTANT

## 2019-10-21 PROCEDURE — 1036F TOBACCO NON-USER: CPT | Performed by: PHYSICIAN ASSISTANT

## 2019-10-21 PROCEDURE — 99213 OFFICE O/P EST LOW 20 MIN: CPT | Performed by: PHYSICIAN ASSISTANT

## 2019-10-21 PROCEDURE — G8484 FLU IMMUNIZE NO ADMIN: HCPCS | Performed by: PHYSICIAN ASSISTANT

## 2019-10-21 PROCEDURE — G8417 CALC BMI ABV UP PARAM F/U: HCPCS | Performed by: PHYSICIAN ASSISTANT

## 2019-10-31 ENCOUNTER — TELEPHONE (OUTPATIENT)
Dept: PRIMARY CARE CLINIC | Age: 44
End: 2019-10-31

## 2019-11-14 ENCOUNTER — OFFICE VISIT (OUTPATIENT)
Dept: ORTHOPEDIC SURGERY | Age: 44
End: 2019-11-14
Payer: COMMERCIAL

## 2019-11-14 VITALS — WEIGHT: 250 LBS | HEIGHT: 69 IN | BODY MASS INDEX: 37.03 KG/M2

## 2019-11-14 DIAGNOSIS — S93.492D SPRAIN OF ANTERIOR TALOFIBULAR LIGAMENT OF LEFT ANKLE, SUBSEQUENT ENCOUNTER: Primary | ICD-10-CM

## 2019-11-14 DIAGNOSIS — S93.602D FOOT SPRAIN, LEFT, SUBSEQUENT ENCOUNTER: ICD-10-CM

## 2019-11-14 PROCEDURE — 99213 OFFICE O/P EST LOW 20 MIN: CPT | Performed by: PHYSICIAN ASSISTANT

## 2019-11-22 ENCOUNTER — HOSPITAL ENCOUNTER (OUTPATIENT)
Dept: MRI IMAGING | Age: 44
Discharge: HOME | End: 2019-11-22
Payer: COMMERCIAL

## 2019-11-22 DIAGNOSIS — S86.112D RUPTURE OF LEFT GASTROCNEMIUS TENDON, SUBSEQUENT ENCOUNTER: ICD-10-CM

## 2019-11-22 PROCEDURE — 73718 MRI LOWER EXTREMITY W/O DYE: CPT

## 2019-12-18 ENCOUNTER — TELEPHONE (OUTPATIENT)
Dept: ORTHOPEDIC SURGERY | Age: 44
End: 2019-12-18

## 2020-01-10 ENCOUNTER — TELEPHONE (OUTPATIENT)
Dept: ORTHOPEDIC SURGERY | Age: 45
End: 2020-01-10

## 2020-02-21 ENCOUNTER — OFFICE VISIT (OUTPATIENT)
Dept: PRIMARY CARE CLINIC | Age: 45
End: 2020-02-21
Payer: MEDICARE

## 2020-02-21 VITALS
WEIGHT: 254.6 LBS | BODY MASS INDEX: 38.58 KG/M2 | HEIGHT: 68 IN | SYSTOLIC BLOOD PRESSURE: 101 MMHG | OXYGEN SATURATION: 97 % | TEMPERATURE: 97.1 F | DIASTOLIC BLOOD PRESSURE: 68 MMHG | HEART RATE: 70 BPM

## 2020-02-21 PROCEDURE — G8427 DOCREV CUR MEDS BY ELIG CLIN: HCPCS | Performed by: FAMILY MEDICINE

## 2020-02-21 PROCEDURE — G8484 FLU IMMUNIZE NO ADMIN: HCPCS | Performed by: FAMILY MEDICINE

## 2020-02-21 PROCEDURE — 1036F TOBACCO NON-USER: CPT | Performed by: FAMILY MEDICINE

## 2020-02-21 PROCEDURE — 99214 OFFICE O/P EST MOD 30 MIN: CPT | Performed by: FAMILY MEDICINE

## 2020-02-21 PROCEDURE — G8417 CALC BMI ABV UP PARAM F/U: HCPCS | Performed by: FAMILY MEDICINE

## 2020-02-21 RX ORDER — BUDESONIDE AND FORMOTEROL FUMARATE DIHYDRATE 160; 4.5 UG/1; UG/1
2 AEROSOL RESPIRATORY (INHALATION) 2 TIMES DAILY
Qty: 1 INHALER | Refills: 1 | Status: SHIPPED | OUTPATIENT
Start: 2020-02-21 | End: 2021-12-27 | Stop reason: SDUPTHER

## 2020-02-21 RX ORDER — ALBUTEROL SULFATE 90 UG/1
2 AEROSOL, METERED RESPIRATORY (INHALATION) EVERY 6 HOURS PRN
Qty: 1 INHALER | Refills: 3 | Status: SHIPPED | OUTPATIENT
Start: 2020-02-21 | End: 2021-12-27 | Stop reason: SDUPTHER

## 2020-02-21 RX ORDER — ACETAMINOPHEN 500 MG
500 TABLET ORAL 4 TIMES DAILY PRN
Qty: 100 TABLET | Refills: 1 | Status: SHIPPED | OUTPATIENT
Start: 2020-02-21 | End: 2021-12-27

## 2020-02-21 RX ORDER — FLUTICASONE PROPIONATE 50 MCG
1 SPRAY, SUSPENSION (ML) NASAL DAILY
Qty: 1 BOTTLE | Refills: 3 | Status: SHIPPED | OUTPATIENT
Start: 2020-02-21 | End: 2024-02-16

## 2020-02-21 RX ORDER — AZITHROMYCIN 250 MG/1
TABLET, FILM COATED ORAL
Qty: 1 PACKET | Refills: 0 | Status: SHIPPED | OUTPATIENT
Start: 2020-02-21 | End: 2021-05-03 | Stop reason: ALTCHOICE

## 2020-02-21 ASSESSMENT — PATIENT HEALTH QUESTIONNAIRE - PHQ9
1. LITTLE INTEREST OR PLEASURE IN DOING THINGS: 0
SUM OF ALL RESPONSES TO PHQ QUESTIONS 1-9: 0
SUM OF ALL RESPONSES TO PHQ9 QUESTIONS 1 & 2: 0
2. FEELING DOWN, DEPRESSED OR HOPELESS: 0
SUM OF ALL RESPONSES TO PHQ QUESTIONS 1-9: 0

## 2020-02-21 ASSESSMENT — ENCOUNTER SYMPTOMS
CONSTIPATION: 0
PHOTOPHOBIA: 0
APNEA: 0
COUGH: 1
CHOKING: 0
RECTAL PAIN: 0
EYE PAIN: 0
ANAL BLEEDING: 0
SORE THROAT: 0
BLOOD IN STOOL: 0
SINUS PRESSURE: 1
VOICE CHANGE: 0
VOMITING: 0
EYE ITCHING: 0
CHEST TIGHTNESS: 0
SHORTNESS OF BREATH: 0
EYE REDNESS: 0
RHINORRHEA: 1
NAUSEA: 0
SINUS PAIN: 1
ABDOMINAL PAIN: 0
FACIAL SWELLING: 0
WHEEZING: 0
TROUBLE SWALLOWING: 0
HEARTBURN: 0
HEMOPTYSIS: 0
BACK PAIN: 0
EYE DISCHARGE: 0
COLOR CHANGE: 0

## 2020-02-21 NOTE — PROGRESS NOTES
Subjective:      Patient ID: Atul Herrmann is a 38 y.o. male. Fu for cough/asthma variant  Cough   This is a recurrent (46 y/o male known cough asthma variant with 2 - 3 days of recurrent non prod cough assoc with some sinus like sxs including congestion and headaches, not seem to be getting better. using sinus congestion meds otc without improvement. no wheezing, albuterol) problem. The current episode started in the past 7 days. The problem has been gradually worsening. Associated symptoms include postnasal drip and rhinorrhea. Pertinent negatives include no chest pain, chills, ear congestion, ear pain, eye redness, fever, headaches, heartburn, hemoptysis, myalgias, rash, sore throat, shortness of breath, sweats, weight loss or wheezing. Nothing aggravates the symptoms. He has tried a beta-agonist inhaler and steroid inhaler for the symptoms. The treatment provided mild relief. His past medical history is significant for asthma and pneumonia. There is no history of bronchiectasis, bronchitis, COPD, emphysema or environmental allergies. Review of Systems   Constitutional: Negative for activity change, appetite change, chills, diaphoresis, fatigue, fever, unexpected weight change and weight loss. HENT: Positive for postnasal drip, rhinorrhea, sinus pressure, sinus pain and sneezing. Negative for congestion, dental problem, drooling, ear discharge, ear pain, facial swelling, hearing loss, mouth sores, nosebleeds, sore throat, tinnitus, trouble swallowing and voice change. Eyes: Negative for photophobia, pain, discharge, redness, itching and visual disturbance. Respiratory: Positive for cough. Negative for apnea, hemoptysis, choking, chest tightness, shortness of breath and wheezing. Cardiovascular: Negative for chest pain, palpitations and leg swelling. Gastrointestinal: Negative for abdominal pain, anal bleeding, blood in stool, constipation, heartburn, nausea, rectal pain and vomiting. the right side and 2+ on the left side. Popliteal pulses are 2+ on the right side and 2+ on the left side. Dorsalis pedis pulses are 2+ on the right side and 2+ on the left side. Posterior tibial pulses are 2+ on the right side and 2+ on the left side. Heart sounds: Normal heart sounds. No murmur. No friction rub. Pulmonary:      Effort: Pulmonary effort is normal. No respiratory distress. Breath sounds: Normal breath sounds. No stridor. No wheezing or rales. Chest:      Chest wall: No tenderness. Abdominal:      General: Bowel sounds are normal. There is no distension. Palpations: Abdomen is soft. There is no mass. Tenderness: There is no abdominal tenderness. There is no guarding or rebound. Musculoskeletal: Normal range of motion. General: No tenderness. Lymphadenopathy:      Cervical: No cervical adenopathy. Skin:     General: Skin is warm and dry. Coloration: Skin is not pale. Findings: No rash. Neurological:      Mental Status: He is oriented to person, place, and time. Cranial Nerves: No cranial nerve deficit. Motor: No abnormal muscle tone. Coordination: Coordination normal.      Deep Tendon Reflexes: Reflexes normal.   Psychiatric:         Behavior: Behavior normal.         Thought Content: Thought content normal.         Judgment: Judgment normal.         Assessment:      1. Acute frontal sinusitis, recurrence not specified  Antibiotics  flonase  acetaminophen  - azithromycin (ZITHROMAX) 250 MG tablet; Take 2 tabs (500 mg) on Day 1, and take 1 tab (250 mg) on days 2 through 5. Dispense: 1 packet; Refill: 0  - fluticasone (FLONASE) 50 MCG/ACT nasal spray; 1 spray by Nasal route daily  Dispense: 1 Bottle; Refill: 3  - acetaminophen (TYLENOL) 500 MG tablet; Take 1 tablet by mouth 4 times daily as needed for Pain  Dispense: 100 tablet; Refill: 1    2.  Moderate persistent asthma with acute

## 2020-03-13 ENCOUNTER — TELEPHONE (OUTPATIENT)
Dept: PRIMARY CARE CLINIC | Age: 45
End: 2020-03-13

## 2020-03-13 NOTE — TELEPHONE ENCOUNTER
Patient is requesting a callback regarding taking certain medication and if they will have an reaction if he takes them together.

## 2020-10-26 ENCOUNTER — OFFICE VISIT (OUTPATIENT)
Dept: ORTHOPEDIC SURGERY | Age: 45
End: 2020-10-26
Payer: COMMERCIAL

## 2020-10-26 VITALS — BODY MASS INDEX: 38.49 KG/M2 | HEIGHT: 68 IN | TEMPERATURE: 97.1 F | WEIGHT: 254 LBS

## 2020-10-26 PROCEDURE — 99213 OFFICE O/P EST LOW 20 MIN: CPT | Performed by: PHYSICIAN ASSISTANT

## 2020-10-26 NOTE — PROGRESS NOTES
Patient Name: Cricekt Parikh  Medical Record Number: [id number]  YOB: 1975  Date of Encounter: 10/26/2020     Chief Complaint   Patient presents with    Follow-up     left calf pain        History of Present Illness:   Mr. Cricket Parikh is here in 2+ year follow up regarding his left gastrocnemius strain during a work injury on 9/19/2018. Patient has continued to have chronic left calf pain since that time. He has tried rest, ice, heat, anti-inflammatories, Tylenol, activity modification and physical therapy. He presents today stating his left calf continues to feel like \"it is going to snap\". Patient states this makes it very hard to walk. He denies swelling, redness, warmth or bruising to this area. He denies pain in the left knee, ankle or foot. The patient's past medical history, medications, allergies, family history, social history, and review of systems have been reviewed, and dated and are recorded in the chart under the 'MEDIA\" tab. Physical Exam:    Mr. Cricket Parikh appears well, he is in no apparent distress, he demonstrates appropriate mood & affect. He is alert and oriented to person, place and time. Temp 97.1 °F (36.2 °C)   Ht 5' 8\" (1.727 m)   Wt 254 lb (115.2 kg)   BMI 38.62 kg/m²     On examination of patient's left lower leg there is no acute swelling. He does have tenderness on palpation of the mid calf, mostly along the medial portion. Patient denies pain with range of motion testing of the left knee or ankle, including dorsiflexion. There is no lower extremity edema or signs of DVT. Radiology:    Left tibia/fibula MRI completed 10/10/2018: Impression    1. Findings consistent with tearing of the myotendinous junction of the    inferior medial head of the gastrocnemius muscle with slight elevation/mild    1.9 cm retraction of the inferior medial head of the gastrocnemius muscle.     Concurrent plantaris tendon tear is difficult to entirely exclude. 2. Mild edema in the subcutaneous fat about the left leg, more so than the    right. 3. No acute osseous abnormality identified. The findings were sent to the Radiology Results [address] at 10:14    am on 10/10/2018 to be communicated to a licensed caregiver. Left tibia/fibula MRI completed 11/22/2019:    Impression    1. Sequela of remote medial gastrocnemius musculotendinous junction partial    tear with scarring.  Mild atrophy of the medial gastrocnemius muscle at the    musculotendinous junction has developed since prior exam.  No significant    musculotendinous retraction when compared to the opposite leg. Left lower extremity Doppler ultrasound completed 9/9/2019:     Summary          No evidence of deep vein or superficial vein thrombosis involving the left     lower extremity and the right common femoral vein.     The proximal to mid calf veins were not well visualized on the left due to     body habitus. Left lower extremity diagnostic ultrasound performed by Dr. Navya Lopez 9/10/2018:    Limited ultrasound evaluation-left lower extremity  Logically ultrasound  10 MHz     The patient was positioned prone on examination table foot and ankle off the edge. Linear transducer was utilized in the medial gastrocnemius muscle was evaluated extensively long axis and short axis and its inferior distribution at the distal myotendinous junction.     There were discrete anechoic areas within the muscle at the myotendinous junction consistent with tear/high-grade strain. There was no considerable retraction evident sonographically.   Only mild gapping was noted at these regions with dynamic stressing.     The more proximal region of the muscle belly was noted to have a normal sonographic appearance as did the lateral gastrocnemius muscle at the distal myotendinous junction.     The Achilles tendon was noted to be intact distal to the myotendinous junction.     There was no evidence of intra-muscular hematoma.     There was tenderness to sono palpation involving the gastrocnemius at the distal myotendinous junction. Orders:  Orders Placed This Encounter   Procedures   Kelby Thompson MD, Orthopedic Surgery (Foot, Ankle), [facility]       Impression:   Diagnosis Orders   1. Gastrocnemius muscle strain, left, subsequent encounter  Kelby Thompson MD, Orthopedic Surgery (Foot, Ankle), [facility]       Treatment Plan:    Patient is over 2 years out from his initial left calf injury which was Worker's Compensation. He has continued having chronic left calf pain despite multiple conservative treatments as documented above. Patient presents today stating his left calf frequently feels like \"it is going to snap\". Physical exam today is unremarkable with mild tenderness on palpation over the medial mid calf. Patient denies any pain with left knee or ankle range of motion testing which includes dorsiflexion of the left ankle. Patient has had 2 MRIs of his left tibia/fibula with results documented above. He has had multiple Doppler ultrasounds that have been negative for DVT. He had another diagnostic left lower extremity ultrasound completed by Dr. Carlos Angeles on 9/10/2018 with dictation documented above. Dr. Carlos Angeles recommended PRP injection however Worker's Compensation denied the request.  We submitted for a another MRI which was also declined by [facility]. I recommend patient receive a second opinion with foot and ankle orthopedic surgeon, Dr. Anthony Galaviz. Mackenzie Selby was informed of the results of any imaging. We discussed treatment options and a time was given to answer questions. A plan was proposed and Mackenzie Selby understand and accepts this course of care.           Electronically signed by Betsy Graahm PA-C on 98/33/1774  Board Certified [employer]    Please note that portions of this note were completed with a voice recognition program.  Efforts were made to edit the dictations but occasionally words are mis-transcribed.

## 2021-01-06 ENCOUNTER — OFFICE VISIT (OUTPATIENT)
Dept: ORTHOPEDIC SURGERY | Age: 46
End: 2021-01-06
Payer: COMMERCIAL

## 2021-01-06 VITALS — WEIGHT: 254 LBS | HEIGHT: 68 IN | BODY MASS INDEX: 38.49 KG/M2

## 2021-01-06 DIAGNOSIS — M25.572 CHRONIC PAIN OF LEFT ANKLE: ICD-10-CM

## 2021-01-06 DIAGNOSIS — G89.29 CHRONIC PAIN OF LEFT ANKLE: ICD-10-CM

## 2021-01-06 DIAGNOSIS — M79.662 PAIN OF LEFT CALF: ICD-10-CM

## 2021-01-06 DIAGNOSIS — M79.672 LEFT FOOT PAIN: Primary | ICD-10-CM

## 2021-01-06 DIAGNOSIS — S86.112A STRAIN OF GASTROCNEMIUS MUSCLE OF LEFT LOWER EXTREMITY, INITIAL ENCOUNTER: ICD-10-CM

## 2021-01-06 PROCEDURE — 99243 OFF/OP CNSLTJ NEW/EST LOW 30: CPT | Performed by: ORTHOPAEDIC SURGERY

## 2021-01-06 NOTE — PROGRESS NOTES
Chief Complaint    Leg Pain (LEFT MEDIAL CALF PAIN FROM WORK INJURY- 1X CONSULT )      History of Present Illness:  Humberto Joyce is a 41 y.o. male who I was asked to see in consultation by Dr. Corie Moran for evaluation of chronic left medial gastroc strain. This started approximately 2 years ago when he had an eccentric loading injury while at work on the steps. The symptoms are worse with activity and especially bending over to pick things up which causes dorsiflexion stretch and better with rest. Patient endorses calf pain and tightness with a feeling like \"something is going to staff \"but denies numbness or tingling. Treatment to date includes: Physical therapy. Medical History:  Patient's medications, allergies, past medical, surgical, social and family histories were reviewed and updated as appropriate. Review of Systems:  Complete review of systems reviewed and available in the patient's chart. They are negative or noncontributory except as per HPI. Vital Signs: There were no vitals filed for this visit.     General: well-developed, well-nourished; adequately groomed  CV: RR  RESP: Respirations unlabored on RA  Skin: No rashes or ulcerations appreciated  Psych: appropriate mood and affect      Musculoskeletal:    Extremity: Left lower    Inspection: No swelling or ecchymosis    Palpation: Mild tenderness palpation about the distal medial gastroc    Range of Motion: Tight hamstrings and gastroc    Stability: Ankle stable    Strength: 5 out of 5 strength in his right side, 4+ out of 5 strength globally on his left side with dorsiflexion plantarflexion inversion and eversion    Special Tests: Positive Silfverskiold    Gait: Normal    Pulse/perfusion: 2+ dorsalis pedis pulse, foot warm and well perfused    Neurological:    Sensation: Sensation intact to light touch throughout     Reflexes: Deep tendon reflexes intact     Functional: the patient has good coordination and balance     Radiology: X-rays obtained and reviewed in office:  Views AP and lateral tib-fib  Impression no acute osseous abnormality. Assessment : 67-year-old male with chronic symptoms because of medial gastrocnemius insertional tear    Impression:  Encounter Diagnoses   Name Primary?  Left foot pain Yes    Chronic pain of left ankle     Pain of left calf     Strain of gastrocnemius muscle of left lower extremity, initial encounter        Office Procedures:  Orders Placed This Encounter   Procedures    XR TIBIA FIBULA LEFT (2 VIEWS)     Standing Status:   Future     Number of Occurrences:   1     Standing Expiration Date:   1/6/2022   [facility] Physical Therapy     Referral Priority:   Routine     Referral Type:   Eval and Treat     Referral Reason:   Specialty Services Required     Requested Specialty:   Physical Therapy     Number of Visits Requested:   1       Treatment Plan:      I had a nice conversation with the patient today. I think most of his residual symptoms are related to scar tissue as he certainly did have a significant injury to this but it has been about 2 years now. I think that the pain and feeling of tightness in something is going to snap is related to the scar tissue. To that end my recommendation would be physical therapy that includes [employer] for breaking up the scar tissue and potentially dry needling if the therapist deems appropriate. I do not think that there is anything surgical that I could do. I do think that he would benefit from heel cord stretches. I would like him to give the therapy 2 to 3 months time and then I would be happy to see him back in follow-up for further evaluation to monitor his progression. Medical decision making: Low complexity, x-rays ordered and interpreted.

## 2021-04-17 ENCOUNTER — APPOINTMENT (OUTPATIENT)
Dept: GENERAL RADIOLOGY | Age: 46
End: 2021-04-17
Payer: COMMERCIAL

## 2021-04-17 ENCOUNTER — HOSPITAL ENCOUNTER (EMERGENCY)
Age: 46
Discharge: HOME | End: 2021-04-17
Payer: COMMERCIAL

## 2021-04-17 VITALS
TEMPERATURE: 98.7 F | WEIGHT: 230 LBS | HEIGHT: 68 IN | OXYGEN SATURATION: 95 % | HEART RATE: 90 BPM | DIASTOLIC BLOOD PRESSURE: 88 MMHG | RESPIRATION RATE: 16 BRPM | BODY MASS INDEX: 34.86 KG/M2 | SYSTOLIC BLOOD PRESSURE: 129 MMHG

## 2021-04-17 DIAGNOSIS — S52.124A CLOSED NONDISPLACED FRACTURE OF HEAD OF RIGHT RADIUS, INITIAL ENCOUNTER: Primary | ICD-10-CM

## 2021-04-17 PROCEDURE — 73080 X-RAY EXAM OF ELBOW: CPT

## 2021-04-17 PROCEDURE — 73110 X-RAY EXAM OF WRIST: CPT

## 2021-04-17 PROCEDURE — 73130 X-RAY EXAM OF HAND: CPT

## 2021-04-17 PROCEDURE — 6370000000 HC RX 637 (ALT 250 FOR IP): Performed by: PHYSICIAN ASSISTANT

## 2021-04-17 PROCEDURE — 99283 EMERGENCY DEPT VISIT LOW MDM: CPT

## 2021-04-17 RX ORDER — ONDANSETRON 4 MG/1
4 TABLET, ORALLY DISINTEGRATING ORAL EVERY 8 HOURS PRN
Qty: 20 TABLET | Refills: 0 | Status: SHIPPED | OUTPATIENT
Start: 2021-04-17 | End: 2022-01-20

## 2021-04-17 RX ORDER — IBUPROFEN 600 MG/1
600 TABLET ORAL ONCE
Status: COMPLETED | OUTPATIENT
Start: 2021-04-17 | End: 2021-04-17

## 2021-04-17 RX ORDER — HYDROCODONE BITARTRATE AND ACETAMINOPHEN 5; 325 MG/1; MG/1
1 TABLET ORAL EVERY 6 HOURS PRN
Qty: 10 TABLET | Refills: 0 | Status: SHIPPED | OUTPATIENT
Start: 2021-04-17 | End: 2021-04-19 | Stop reason: SDUPTHER

## 2021-04-17 RX ADMIN — IBUPROFEN 600 MG: 600 TABLET, FILM COATED ORAL at 18:30

## 2021-04-17 ASSESSMENT — ENCOUNTER SYMPTOMS
NAUSEA: 0
VOMITING: 0
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
## 2021-04-19 ENCOUNTER — OFFICE VISIT (OUTPATIENT)
Dept: ORTHOPEDIC SURGERY | Age: 46
End: 2021-04-19
Payer: COMMERCIAL

## 2021-04-19 VITALS — HEIGHT: 68 IN | BODY MASS INDEX: 38.83 KG/M2 | RESPIRATION RATE: 16 BRPM | WEIGHT: 256.2 LBS

## 2021-04-19 DIAGNOSIS — S52.124A CLOSED NONDISPLACED FRACTURE OF HEAD OF RIGHT RADIUS, INITIAL ENCOUNTER: ICD-10-CM

## 2021-04-19 PROCEDURE — 99213 OFFICE O/P EST LOW 20 MIN: CPT | Performed by: ORTHOPAEDIC SURGERY

## 2021-04-19 RX ORDER — HYDROCODONE BITARTRATE AND ACETAMINOPHEN 5; 325 MG/1; MG/1
1 TABLET ORAL EVERY 6 HOURS PRN
Qty: 10 TABLET | Refills: 0 | Status: SHIPPED | OUTPATIENT
Start: 2021-04-19 | End: 2021-04-22
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
## 2021-04-19 NOTE — LETTER
[facility]  [address]  [address] [address]  Phone: [phone]  Fax: [phone]    Mariana Thompson MD        April 19, 2021     Patient: Melvin Roman   YOB: 1975   Date of Visit: 4/19/2021       To Whom It May Concern: It is my medical opinion that Evelyne Nino should remain out of work until May 3, 2021. If you have any questions or concerns, please don't hesitate to call.     Sincerely,            Mariana Thompson MD

## 2021-04-20 ENCOUNTER — TELEPHONE (OUTPATIENT)
Dept: ORTHOPEDIC SURGERY | Age: 46
End: 2021-04-20

## 2021-04-20 NOTE — TELEPHONE ENCOUNTER
Spoke to patient and he has tried using just the sling but he is still having pain. Wants to know what type of brace you might recommend.

## 2021-04-20 NOTE — TELEPHONE ENCOUNTER
General Question     Subject: REQ SPLINT/BRACE FOR R ELBOW    Patient and /or Facility Request: PATIENT   Contact Number: [phone]

## 2021-04-27 ENCOUNTER — NURSE TRIAGE (OUTPATIENT)
Dept: OTHER | Facility: CLINIC | Age: 46
End: 2021-04-27

## 2021-04-27 NOTE — TELEPHONE ENCOUNTER
Call received on [address]. Brief description of triage: Patient calling for questions about symptoms after his COVID-19 vaccination on Friday. Triage indicates for patient to home care. For lingering or worsening symptoms, call PCP. Care advice provided. Recommended using local department of health website for testing locations and up to date information. Caller verbalizes understanding, denies any other questions or concerns; instructed to call back for any new or worsening symptoms. Reason for Disposition   COVID-19 vaccine, systemic reactions (e.g., fatigue, fever, muscle aches), questions about    Answer Assessment - Initial Assessment Questions  1. MAIN CONCERN OR SYMPTOM:  \"What is your main concern right now? \" \"What question do you have? \" \"What's the main symptom you're worried about? \" (e.g., fever, pain, redness, swelling)      Can sit down and be fine, but I fall asleep quickly    2. VACCINE: \"What vaccination did you receive? \" \"Is this your first or second shot? \" (e.g., none; AstraZeneca, J&J, [employer], other)      Kris Law first vaccination:  Friday    3. SYMPTOM ONSET: \"When did the tiredness begin? \" (e.g., not relevant; hours, days)       The next day    4. SYMPTOM SEVERITY: \"How bad is it? \"       Has been improving a little bit since Saturday    5. FEVER: \"Is there a fever? \" If so, ask: \"What is it, how was it measured, and when did it start? \"       No    6. PAST REACTIONS: \"Have you reacted to immunizations before? \" If so, ask: \"What happened? \"      No    7. OTHER SYMPTOMS: \"Do you have any other symptoms? \"      No    Protocols used: CORONAVIRUS (COVID-19) VACCINE QUESTIONS AND REACTIONS-ADULT-[place]

## 2021-04-28 NOTE — TELEPHONE ENCOUNTER
Sleepy since the vaccine appears to be the chief complaint. This should subside in time. Many times people feel sleepy after getting up vaccine because the body is mounting the immune response stimulated by the vaccine. Would complete the vaccine series at this point.

## 2021-04-30 NOTE — PROGRESS NOTES
Geraldine Nash MD  [address], [facility] [address]  [address]  [address] [facility]  [facility]ep [facility]  [phone]  Encounter Date: 5/3/2021  YOB: 1975    Chief Complaint   Patient presents with    Follow-up     Right elbow Fx; doi 4/16/21. History:Mr. Anneliese Poole is here in follow up regarding his closed nondisplaced fracture of head of right radius. DOI 4/16/2021. He reports pain level is averaging 5/10 still. He does use a sling in public. He denies any numbness or tingling down the arm. Still pain with range of motion the elbow and with  activities. 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 Exam:  Ht 5' 8\" (1.727 m)   Wt 258 lb (117 kg)   BMI 39.23 kg/m²   General: Alert and oriented x3, No acute distress, Cooperative and conversant. Obesity Present  Mood and affect are appropriate. Right elbow : Inspection: No swelling. No erythema or ecchymosis. Normal muscle contours of the arm and forearm. Palpation:    no tenderness along the medial epicondyle   no tenderness along the lateral epicondyle   no tenderness over olecranon   mild tenderness of radial head     Range of Motion:    Extension: -20°    Flexion: 95°    Pronation 90°    Supination 40°     Strength:    Flexion 4-/5.  Extension 4-/5.  Wrist flexion and extension 4/5     Gait: Tandem gait. Radiology:     X-rays obtained today and reviewed in office:  Views 3V: right elbow   Impression: No evidence for acute fracture, subluxation, or dislocation. No lytic or blastic lesions in the metaphyseal regions. Healing fracture of radial head without displacement. No joint effusion. Assessment:   Diagnosis Orders   1.  Closed nondisplaced fracture of head of right radius with routine healing, subsequent encounter  XR ELBOW RIGHT (MIN 3 VIEWS)    [facility]       Orders  Orders Placed This Encounter   Procedures    XR ELBOW RIGHT (MIN 3 VIEWS)    [facility]       Plan:  We discussed treatment options. I informed him that today's x-rays show that the fracture is healing. I do believe the next treatment step for him is PT to work on his ROM and regain the functionality of this elbow. He understood and agreed to this plan. He is to go about twice about for a 3-4 weeks. I would like him to return to the office in 3 weeks for repeat x-rays and reassessment. At this point we will give him an additional 4 weeks off of work due to his demands as a  for school bus. He understands and accepts this course of care. By signing my name below, Tiffany Haddad, attest that this documentation has been prepared under the direction and in the presence of Ama Ventura MD.   Electronically Signed: Kaz Ortiz, 4/30/21, 4:17 PM EDT. Leatha Wilkinson MD, personally performed the services described in this documentation. All medical record entries made by the scribe were at my direction and in my presence. I have reviewed the chart and discharge instructions (if applicable) and agree that the record reflects my personal performance and is accurate and complete. Ama Ventura MD, 5/3/21, 8:46 PM EDT. Documentation was done using voice recognition dragon software. Every effort was made to ensure accuracy; however, inadvertent  Unintentional computerized transcription errors may be present.

## 2021-05-03 ENCOUNTER — OFFICE VISIT (OUTPATIENT)
Dept: ORTHOPEDIC SURGERY | Age: 46
End: 2021-05-03
Payer: COMMERCIAL

## 2021-05-03 VITALS — BODY MASS INDEX: 39.1 KG/M2 | WEIGHT: 258 LBS | HEIGHT: 68 IN

## 2021-05-03 DIAGNOSIS — S52.124D CLOSED NONDISPLACED FRACTURE OF HEAD OF RIGHT RADIUS WITH ROUTINE HEALING, SUBSEQUENT ENCOUNTER: Primary | ICD-10-CM

## 2021-05-03 PROCEDURE — 99212 OFFICE O/P EST SF 10 MIN: CPT | Performed by: ORTHOPAEDIC SURGERY

## 2021-05-03 RX ORDER — HYDROCODONE BITARTRATE AND ACETAMINOPHEN 5; 325 MG/1; MG/1
1 TABLET ORAL EVERY 6 HOURS PRN
COMMUNITY
End: 2022-01-20

## 2021-05-03 NOTE — LETTER
[address] [address]  Phone: [phone]  Fax: [phone]    Marce Shepherd MD        May 3, 2021     Patient: Marny Collet   YOB: 1975   Date of Visit: 5/3/2021       To Whom It May Concern: It is my medical opinion that Johanna Bagley may return to work on 5/31/21 . If you have any questions or concerns, please don't hesitate to call.     Sincerely,          Marce Shepherd MD

## 2021-05-05 ENCOUNTER — HOSPITAL ENCOUNTER (OUTPATIENT)
Dept: PHYSICAL THERAPY | Age: 46
Setting detail: THERAPIES SERIES
Discharge: HOME | End: 2021-05-05
Payer: COMMERCIAL

## 2021-05-05 ENCOUNTER — TELEPHONE (OUTPATIENT)
Dept: ORTHOPEDIC SURGERY | Age: 46
End: 2021-05-05

## 2021-05-05 PROCEDURE — 97110 THERAPEUTIC EXERCISES: CPT

## 2021-05-05 PROCEDURE — 97161 PT EVAL LOW COMPLEX 20 MIN: CPT

## 2021-05-05 NOTE — FLOWSHEET NOTE
[address] [facility]  Phone: [phone]   Fax: [phone]    Physical Therapy Treatment Note/ Progress Report:     Date:  2021    Patient Name:  Maddie Veliz    :  1975  MRN: [id number]  Restrictions/Precautions:    Medical/Treatment Diagnosis Information:  Diagnosis: S52.124D (ICD-10-CM) - Closed nondisplaced fracture of head of right radius with routine healing, subsequent encounter  Treatment Diagnosis: Decreased R elbow, wrist and shoulder ROM, flexiblity, muscle strength and activation with limitations with ADL's, IADL's, recreational and work activity. Insurance/Certification information:  PT Insurance Information: [place] (24 visits prior to [facility])  Physician Information:  Referring Practitioner: Taniya Paris MD  Plan of care signed (Y/N): []  Yes [x]  No     Date of Patient follow up with Physician:      Progress Report: []  Yes  [x]  No     Date Range for reporting period:  Beginnin/5  Ending: -    Progress report due (10 Rx/or 30 days whichever is less): visit #10 or  (date)     Recertification due (POC duration/ or 90 days whichever is less): visit #12 or 6/15 (date)     Visit # Insurance Allowable Auth required?  Date Range   1 24 (soft limit) [x]  Yes - After visit 24  []  No -     Latex Allergy:  [x]NO      []YES  Preferred Language for Healthcare:   [x]English       []other:    Functional Scale:       Date assessed:  Quick DASH: raw score = 43; dysfunction = 72%       Pain level:  5-10     SUBJECTIVE:  See eval    OBJECTIVE: See eval   Observation:    Test measurements:      RESTRICTIONS/PRECAUTIONS: NWB until     Exercises/Interventions:   Therapeutic Exercise (57738) Resistance / level Sets / Seconds  Reps Notes/Cues                                                                                Therapeutic Activities (11383)                                          Neuromuscular Re-ed (13467) Manual Intervention (01590)       Shld /GH Mobs       Post Cap mobs       Thoracic/Rib manipualtion       CT MT/Mobs       PROM MT                  Modalities:     Pt. Education:  -pt educated on diagnosis, prognosis and expectations for rehab  -all pt questions were answered    Home Exercise Program:  Access Code: [email or web address]  URL: [email or web address]  Date: 05/05/2021  Prepared by: Gary January    Exercises  Seated Wrist Flexion with Overpressure - 2 x daily - 7 x weekly - 4 reps - 15 hold  Wrist Extension Stretch Pronated - 2 x daily - 7 x weekly - 4 reps - 15 hold  Supported Elbow Flexion Extension PROM - 2 x daily - 7 x weekly - 4 reps - 15 hold  Standing Wrist Extension Stretch - 2 x daily - 7 x weekly - 4 reps - 15 hold  Seated Wrist Supination Stretch - 2 x daily - 7 x weekly - 4 reps - 15 hold      Therapeutic Exercise and NMR EXR  [x] (86324) Provided verbal/tactile cueing for activities related to strengthening, flexibility, endurance, ROM  for improvements in scapular, scapulothoracic and UE control with self care, reaching, carrying, lifting, house/yardwork, driving/computer work.    [] (77499) Provided verbal/tactile cueing for activities related to improving balance, coordination, kinesthetic sense, posture, motor skill, proprioception  to assist with  scapular, scapulothoracic and UE control with self care, reaching, carrying, lifting, house/yardwork, driving/computer work.  [] (33193) Therapist is in constant attendance of 2 or more patients providing skilled therapy interventions, but not providing any significant amount of measurable one-on-one time to either patient, for improvements in cervical, scapular, scapulothoracic and UE control with self care, reaching, carrying, lifting, house/yardwork, driving, computer work.      Therapeutic Activities:    [] (80469 or 94000) Provided verbal/tactile cueing for activities related to improving balance, coordination, kinesthetic sense, posture, motor skill, proprioception and motor activation to allow for proper function of scapular, scapulothoracic and UE control with self care, carrying, lifting, driving/computer work. Home Exercise Program:    [x] (24823) Reviewed/Progressed HEP activities related to strengthening, flexibility, endurance, ROM of scapular, scapulothoracic and UE control with self care, reaching, carrying, lifting, house/yardwork, driving/computer work  [] (06960) Reviewed/Progressed HEP activities related to improving balance, coordination, kinesthetic sense, posture, motor skill, proprioception of scapular, scapulothoracic and UE control with self care, reaching, carrying, lifting, house/yardwork, driving/computer work      Manual Treatments:  PROM / STM / Oscillations-Mobs:  G-I, II, III, IV (PA's, Inf., Post.)  [] (50190) Provided manual therapy to mobilize soft tissue/joints of cervical/CT, scapular GHJ and UE for the purpose of modulating pain, promoting relaxation,  increasing ROM, reducing/eliminating soft tissue swelling/inflammation/restriction, improving soft tissue extensibility and allowing for proper ROM for normal function with self care, reaching, carrying, lifting, house/yardwork, driving/computer work      Charges:  Timed Code Treatment Minutes: 15   Total Treatment Minutes: 45       [] EVAL - LOW (29213)   [] EVAL - MOD (55399)  [] EVAL - HIGH (98805)  [] RE-EVAL (86157)  [] QN(87983) x      [] Ionto  [] NMR (16178) x      [] Vaso  [] Manual (61917) x      [] Ultrasound:  [] TA x       [] Mech Traction (40969)  [] Home Management Training x    [] ES (un) (37004):   [] Aquatic    [] ES(attended) (06288)  [] Other:                     GOALS:  Patient stated goal: get back to prior activity and UE usage without issues  []? Progressing: []? Met: []? Not Met: []? Adjusted     Therapist goals for Patient:   Short Term Goals: To be achieved in: 2 weeks  1.  Independent in HEP and progression per patient tolerance, in order to prevent re-injury. []? Progressing: []? Met: []? Not Met: []? Adjusted  2. Patient will have a decrease in pain to facilitate improvement in movement, function, and ADLs as indicated by Functional Deficits. []? Progressing: []? Met: []? Not Met: []? Adjusted     Long Term Goals: To be achieved in: 6 weeks  1. Disability index score of 10% or less for the UEFI or Quick DASH to assist with reaching prior level of function. []? Progressing: []? Met: []? Not Met: []? Adjusted  2. Patient will demonstrate increased AROM to 0-140 elbow ROM to allow for proper joint functioning as indicated by patients Functional Deficits. []? Progressing: []? Met: []? Not Met: []? Adjusted  3. Patient will demonstrate an increase in Strength to 4+ to allow for proper functional mobility as indicated by patients Functional Deficits. []? Progressing: []? Met: []? Not Met: []? Adjusted  4. Patient will return to functional activities including full work activity without increased symptoms or restriction. []? Progressing: []? Met: []? Not Met: []? Adjusted  5. Patient will get back to driving without issues or limitations from R UE.     []? Progressing: []? Met: []? Not Met: []? Adjusted         Overall Progression Towards Functional goals/ Treatment Progress Update:  [] Patient is progressing as expected towards functional goals listed. [] Progression is slowed due to complexities/Impairments listed. [] Progression has been slowed due to co-morbidities.   [x] Plan just implemented, too soon to assess goals progression <30days   [] Goals require adjustment due to lack of progress  [] Patient is not progressing as expected and requires additional follow up with physician  [] Other    Persisting Functional Limitations/Impairments:  []Sitting []Standing   [x]Transfers  [x]Sleeping   [x]Reaching [x]Lifting   [x]ADLs [x]Housework  [x]Driving [x]Job related tasks  []Sports/Recreation []Other:    ASSESSMENT:  See eval  Treatment/Activity Tolerance:  [x] Pt able to complete treatment [] Patient limited by fatique  [] Patient limited by pain  [] Patient limited by other medical complications  [] Other:     Prognosis: - [x] Good [] Fair  [] Poor    Patient Requires Follow-up: [x] Yes  [] No    Return to Play:    [x]  N/A     []  Stage 1: Intro to Strength   []  Stage 2: Dynamic Strength and Intro to Plyometrics   []  Stage 3: Advanced Plyometrics and Intro to Throwing   []  Stage 4: Sport specific Training/Return to Sport     []  Ready to Return to Play, [employer]   []  Not Ready for Return to Sports   Comments:      PLAN: See eval. PT 2x / week for 6 weeks. [] Continue per plan of care [] Alter current plan (see comments)  [x] Plan of care initiated [] Hold pending MD visit [] Discharge    Electronically signed by: Nguyen Trevino PT, DPT      Note: If patient does not return for scheduled/ recommended follow up visits, this note will serve as a discharge from care along with most recent update on progress.

## 2021-05-05 NOTE — PLAN OF CARE
[address] [address], [address] [place] [address] Drive  Phone: [phone]   Fax: [phone]                                                     Physical Therapy Certification    Dear Referring Practitioner: Irena Kc MD,    We had the pleasure of evaluating the following patient for physical therapy services at [address] [place] [address]. A summary of our findings can be found in the initial assessment below. This includes our plan of care. If you have any questions or concerns regarding these findings, please do not hesitate to contact me at the office phone number checked above. Thank you for the referral.       Physician Signature:_______________________________Date:__________________  By signing above (or electronic signature), therapists plan is approved by physician      Patient: Cris Erickson   : 1975   MRN: [id number]  Referring Physician: Referring Practitioner: Irena Kc MD      Evaluation Date: 2021      Medical Diagnosis Information:  Diagnosis: S52.124D (ICD-10-CM) - Closed nondisplaced fracture of head of right radius with routine healing, subsequent encounter                                             Insurance information:      Precautions/ Contra-indications: -  Latex Allergy:  [x]NO      []YES  Preferred Language for Healthcare:   [x]English       []Other:    C-SSRS Triggered by Intake questionnaire (Past 2 wk assessment ):   [x] No, Questionnaire did not trigger screening.   [] Yes, Patient intake triggered C-SSRS Screening     [] Completed, no further action required.    [] Completed, PCP notified via Epic    SUBJECTIVE: Patient stated complaint: Pt reports he has had issues in the past with his L calf/lower leg and from time to time it flares up and on  he was walking into his apartment going up the stairs and feels his L LE got really weak/\"went out\" when he was going up stairs and fell on outstretched R UE landing hard on his UE. Pt reports he went to the ED due to severity of pain and diagnosed with fractured bone and referred to orthopedic MD. Pt reports after f/u it was found that his fracture was NON displaced and not a surgical candidate and was ok to wear sling/brace prn and referred to PT for management of condition to work on ROM restoration and gradual progression towards strength activity. Pt reports he has also been on light duty working from home since his injury which was extended until the end of May. Pt reports he wears a sling/brace prn on his UE but does not really feel it helps with his pain so is not diligent about wearing it. Pain is consistent but gets worse with activity. Pt is R hand dominate and is significantly limited on what he can/cannot do due to pain. His overall goal is to get back to where he was prior to his injury. Relevant Medical History: L calf work injury, Neck fusion, stomach ulcers   Functional Outcome: Quick DASH: raw score = 43; dysfunction = 72%    Pain Scale: 5-8/10  Easing factors: rest, change in activity, narcotic pain medication prn   Provocative factors: movement/activity of any kind. 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        OBJECTIVE:     Hand dominance: Right hand    Palpation: Moderate tenderness throughout elbow, medial/lateral.     Functional Mobility/Transfers: Independent however pt does not utilize R UE for any transfer supine to/from sit and sit to/from stand    Posture: good    Bandages/Dressings/Incisions: N/A      Dermatomes Normal Abnormal Comments   Top of head (C1)      Posterior occipital region (C2) Side of neck (C3)      Top of shoulder (C4) x     Lateral deltoid (C5) x     Tip of thumb (C6) x     Distal middle finger (C7) x     Distal fifth finger (C8) x     Medial forearm (T1) x          PROM AROM    L R L R   Cervical Flexion        Cervical Extension        Cervical Rotation       Cervical Side-bend       Shoulder Flexion   105  100   Shoulder Abduction     90   Shoulder External Rotation     42   Shoulder Internal Rotation     60   Elbow Flexion    143 133   Elbow Extension   Lacking 12 from neutral 0 Lack 20 from neutral *   Pronation     85*   Supination   70  63*   Wrist Flexion     WFL *   Wrist Extension     WFL*   Radial Deviation        Ulnar Deviation        *Pain at end range     Strength (0-5)  Not tested due to recent fracture Left Right    Shoulder Shrug (C4)     Shoulder Flex     Shoulder Abd (C5)     Shoulder ER     Shoulder IR     Biceps (C6)     Triceps (C7)     Lats     Middle Trap     Rhomboids     Lower Trap      Pronation      Supination      Wrist Flex (C7)     Wrist Ext (C6)     Wrist Radial Deviation     Wrist Ulnar Deviation                    Joint mobility: -   []Normal    []Hypo   []Hyper                                  [x] Patient history, allergies, meds reviewed. Medical chart reviewed. See intake form. Review Of Systems (ROS):  [x]Performed Review of systems (Integumentary, CardioPulmonary, Neurological) by intake and observation. Intake form has been scanned into medical record. Patient has been instructed to contact their primary care physician regarding ROS issues if not already being addressed at this time.       Co-morbidities/Complexities (which will affect course of rehabilitation): -  []None        []Hx of COVID   Arthritic conditions   []Rheumatoid arthritis (M05.9)  []Osteoarthritis (M19.91)  []Gout   Cardiovascular conditions   []Hypertension (I10)  []Hyperlipidemia (E78.5)  []Angina pectoris (I20)  []Atherosclerosis (I70)  []Pacemaker  []Hx of CABG/stent/  cardiac surgeries   Musculoskeletal conditions   []Disc pathology   []Congenital spine pathologies   []Osteoporosis (M81.8)  []Osteopenia (M85.8)  []Scoliosis       Endocrine conditions   []Hypothyroid (E03.9)  []Hyperthyroid Gastrointestinal conditions   []Constipation (A41.92)   Metabolic conditions   []Morbid obesity (E66.01)  []Diabetes type 1(E10.65) or 2 (E11.65)   []Neuropathy (G60.9)     Cardio/Pulmonary conditions   []Asthma (J45)  []Coughing   []COPD (J44.9)  []CHF  []A-fib   Psychological Disorders  []Anxiety (F41.9)  []Depression (F32.9)   []Other:   Developmental Disorders  []Autism (F84.0)  []CP (G80)  []Down Syndrome (Q90.9)  []Developmental delay     Neurological conditions  []Prior Stroke (I69.30)  []Parkinson's (G20)  []Encephalopathy (G93.40)  []MS (G35)  []Post-polio (G14)  []SCI  []TBI  []ALS Other conditions  []Fibromyalgia (M79.7)  []Vertigo  []Syncope  []Kidney Failure  []Cancer      []currently undergoing                treatment  []Pregnancy  []Incontinence   Prior surgeries  []involved limb  [x]previous spinal surgery  [] section birth  []hysterectomy  []bowel / bladder surgery  [x]other relevant surgeries   []Other:              Barriers to/and or personal factors that will affect rehab potential:              [x]Age  [x]Sex    []Smoker              [x]Motivation/Lack of Motivation                        [x]Co-Morbidities              []Cognitive Function, education/learning barriers              []Environmental, home barriers              []profession/work barriers  []past PT/medical experience  []other:  Justification: Younger individual with sedantary lifestyle with few co morbidities, favorable rehab potential.      Falls Risk Assessment (30 days): -  [x] Falls Risk assessed and no intervention required.   [] Falls Risk assessed and Patient requires intervention due to being higher risk   TUG score (>12s at risk):     [] Falls education provided, including ASSESSMENT: Pt is a 42 y/o male presenting to PT with decreased R shoulder, elbow and wrist ROM, muscle activation and flexibility with limitations and pain interfering with ADL's, IADL's, recreational and work activity all consistent with referring diagnosis of R proximal radius fracture. Pt will benefit from PT to work to restore functional ROM and muscle activation with gradual increase in strength progressions to achieve LTG's and restore functional mobility to return to all prior level activity without issues or restrictions. Functional Impairments   [x]Noted spinal or UE joint hypomobility   []Noted spinal or UE joint hypermobility   [x]Decreased UE functional ROM   [x]Decreased UE functional strength   []Abnormal reflexes/sensation/myotomal/dermatomal deficits   [x]Decreased RC/scapular/core strength and neuromuscular control   []other:      Functional Activity Limitations (from functional questionnaire and intake)   [x]Reduced ability to tolerate prolonged functional positions   [x]Reduced ability or difficulty with changes of positions or transfers between positions   []Reduced ability to maintain good posture and demonstrate good body mechanics with sitting, bending, and lifting   [x] Reduced ability or tolerance with driving and/or computer work   [x]Reduced ability to sleep   [x]Reduced ability to perform lifting, reaching, carrying tasks   [x]Reduced ability to tolerate impact through UE   [x]Reduced ability to reach behind back   [x]Reduced ability to  or hold objects   [x]Reduced ability to throw or toss an object   []other:    Participation Restrictions   [x]Reduced participation in self care activities   [x]Reduced participation in home management activities   [x]Reduced participation in work activities   [x]Reduced participation in social activities. []Reduced participation in sport/recreation activities.     Classification:   []Signs/symptoms consistent with post-surgical status including decreased ROM, strength and function. []Signs/symptoms consistent with joint sprain/strain   []Signs/symptoms consistent with shoulder impingement   []Signs/symptoms consistent with shoulder/elbow/wrist tendinopathy   []Signs/symptoms consistent with Rotator cuff tear   []Signs/symptoms consistent with labral tear   []Signs/symptoms consistent with postural dysfunction    []Signs/symptoms consistent with Glenohumeral IR Deficit - <45 degrees   []Signs/symptoms consistent with facet dysfunction of cervical/thoracic spine    []Signs/symptoms consistent with pathology which may benefit from Dry needling     [x]other: Signs and symptoms consistent with R radial fracture    Prognosis/Rehab Potential:      []Excellent   [x]Good    []Fair   []Poor    Tolerance of evaluation/treatment:    []Excellent   [x]Good    []Fair   []Poor    Physical Therapy Evaluation Complexity Justification  [x] A history of present problem with:  [] no personal factors and/or comorbidities that impact the plan of care;  [x]1-2 personal factors and/or comorbidities that impact the plan of care  []3 personal factors and/or comorbidities that impact the plan of care  [x] An examination of body systems using standardized tests and measures addressing any of the following: body structures and functions (impairments), activity limitations, and/or participation restrictions;:  [x] a total of 1-2 or more elements   [] a total of 3 or more elements   [] a total of 4 or more elements   [x] A clinical presentation with:  [x] stable and/or uncomplicated characteristics   [] evolving clinical presentation with changing characteristics  [] unstable and unpredictable characteristics;   [x] Clinical decision making of [x] low-, [] moderate, [] high complexity using standardized patient assessment instrument and/or measurable assessment of functional outcome.     [x] EVAL (LOW) 11121 (typically 15 minutes face-to-face)  [] EVAL (MOD) 40549 (typically 30 minutes face-to-face)  [] EVAL (HIGH) 87533 (typically 45 minutes face-to-face)  [] RE-EVAL     PLAN:  Frequency/Duration:  2 days per week for 6 Weeks:  INTERVENTIONS:  [x] Therapeutic exercise including: strength training, ROM, for Upper extremity and core   [x]  NMR activation and proprioception for UE, scap and Core   [x] Manual therapy as indicated for shoulder, scapula and spine to include: Dry Needling/IASTM, STM, PROM, Gr I-IV mobilizations, manipulation. [x] Modalities as needed that may include: thermal agents, E-stim, Biofeedback, US, iontophoresis as indicated  [x] Patient education on joint protection, postural re-education, activity modification, progression of HEP. HEP instruction: Written HEP instructions provided and reviewed    GOALS:  Patient stated goal: get back to prior activity and UE usage without issues  [] Progressing: [] Met: [] Not Met: [] Adjusted    Therapist goals for Patient:   Short Term Goals: To be achieved in: 2 weeks  1. Independent in HEP and progression per patient tolerance, in order to prevent re-injury. [] Progressing: [] Met: [] Not Met: [] Adjusted  2. Patient will have a decrease in pain to facilitate improvement in movement, function, and ADLs as indicated by Functional Deficits. [] Progressing: [] Met: [] Not Met: [] Adjusted    Long Term Goals: To be achieved in: 6 weeks  1. Disability index score of 10% or less for the UEFI or Quick DASH to assist with reaching prior level of function. [] Progressing: [] Met: [] Not Met: [] Adjusted  2. Patient will demonstrate increased AROM to 0-140 elbow ROM to allow for proper joint functioning as indicated by patients Functional Deficits. [] Progressing: [] Met: [] Not Met: [] Adjusted  3. Patient will demonstrate an increase in Strength to 4+ to allow for proper functional mobility as indicated by patients Functional Deficits. [] Progressing: [] Met: [] Not Met: [] Adjusted  4.  Patient will return to functional activities including full work activity without increased symptoms or restriction. [] Progressing: [] Met: [] Not Met: [] Adjusted  5.  Patient will get back to driving without issues or limitations from R UE.     [] Progressing: [] Met: [] Not Met: [] Adjusted     Electronically signed by:        Ravindra Washington PT, DPT, OMT-C

## 2021-05-07 ENCOUNTER — HOSPITAL ENCOUNTER (OUTPATIENT)
Dept: PHYSICAL THERAPY | Age: 46
Setting detail: THERAPIES SERIES
Discharge: HOME | End: 2021-05-07
Payer: COMMERCIAL

## 2021-05-07 PROCEDURE — 97110 THERAPEUTIC EXERCISES: CPT | Performed by: SPECIALIST/TECHNOLOGIST

## 2021-05-07 PROCEDURE — 97140 MANUAL THERAPY 1/> REGIONS: CPT | Performed by: SPECIALIST/TECHNOLOGIST

## 2021-05-07 PROCEDURE — 97530 THERAPEUTIC ACTIVITIES: CPT | Performed by: SPECIALIST/TECHNOLOGIST

## 2021-05-07 NOTE — FLOWSHEET NOTE
[facility]  Phone: [phone]   Fax: [phone]    Physical Therapy Treatment Note/ Progress Report:     Date:  2021    Patient Name:  Melida Rojo    :  1975  MRN: [id number]  Restrictions/Precautions:    Medical/Treatment Diagnosis Information:  Diagnosis: S52.124D (ICD-10-CM) - Closed nondisplaced fracture of head of right radius with routine healing, subsequent encounter  Treatment Diagnosis: Decreased R elbow, wrist and shoulder ROM, flexiblity, muscle strength and activation with limitations with ADL's, IADL's, recreational and work activity. Insurance/Certification information:  PT Insurance Information: [place] (24 visits prior to [facility])  Physician Information:  Referring Practitioner: Esthela Leyva MD  Plan of care signed (Y/N): []  Yes [x]  No     Date of Patient follow up with Physician:      Progress Report: []  Yes  [x]  No     Date Range for reporting period:  Beginnin/5  Ending: -    Progress report due (10 Rx/or 30 days whichever is less): visit #10 or  (date)     Recertification due (POC duration/ or 90 days whichever is less): visit #12 or 6/15 (date)     Visit # Insurance Allowable Auth required? Date Range   2 24 (soft limit) [x]  Yes - After visit 24  []  No -     Latex Allergy:  [x]NO      []YES  Preferred Language for Healthcare:   [x]English       []other:    Functional Scale:       Date assessed:  Quick DASH: raw score = 43; dysfunction = 72%       Pain level:  5/10 rest    SUBJECTIVE: Rt. Elbow felt worse and was tender after last session with PT. Using ice 2x /day. Unable to take OTC  meds  B/c of stomach ulcer.     Dr. Oc Mancilla     OBJECTIVE:    Observation:     21  RT elbow extension  AAROM  -7   Test measurements:      RESTRICTIONS/PRECAUTIONS: NWB until     Exercises/Interventions:   Therapeutic Exercise (41467)  15' Resistance / level Sets / Seconds  Reps Notes/Cues    Wrist extension/ flexion stretching  Standing elbow extension EOB  15\" 5xea    Wrist supination    Stretching  seated  15\" 5x           Elbow flexion and stretching  15\" 5x ea    Gripping  Lime putty 3'             scap retractions/ postural cues  10\" 10x                                Therapeutic Activities (46876) 8'      Pt education regarding stretching, desk posture, OTC analgesic options (volt. Gel) and progression of stretching etc.                                    Neuromuscular Re-ed (52204)                                          Manual Intervention (96776) 15'       Shld /GH Mobs       Post Cap mobs       Thoracic/Rib manipualtion       CT MT/Mobs       PROM MT       STM  Gentle  15'   Over elbow/ wrist flexors/ extensors, biceps       Modalities:     Pt. Education:  -pt educated on diagnosis, prognosis and expectations for rehab  -all pt questions were answered    Home Exercise Program:  Access Code: [email or web address]  URL: [email or web address]  Date: 05/05/2021  Prepared by: Melissa Factor    Exercises  Seated Wrist Flexion with Overpressure - 2 x daily - 7 x weekly - 4 reps - 15 hold  Wrist Extension Stretch Pronated - 2 x daily - 7 x weekly - 4 reps - 15 hold  Supported Elbow Flexion Extension PROM - 2 x daily - 7 x weekly - 4 reps - 15 hold  Standing Wrist Extension Stretch - 2 x daily - 7 x weekly - 4 reps - 15 hold  Seated Wrist Supination Stretch - 2 x daily - 7 x weekly - 4 reps - 15 hold      Therapeutic Exercise and NMR EXR  [x] (34046) Provided verbal/tactile cueing for activities related to strengthening, flexibility, endurance, ROM  for improvements in scapular, scapulothoracic and UE control with self care, reaching, carrying, lifting, house/yardwork, driving/computer work.    [] (86706) Provided verbal/tactile cueing for activities related to improving balance, coordination, kinesthetic sense, posture, motor skill, proprioception  to assist with  scapular, scapulothoracic and UE control with self care, reaching, carrying, lifting, house/yardwork, driving/computer work.  [] (47126) Therapist is in constant attendance of 2 or more patients providing skilled therapy interventions, but not providing any significant amount of measurable one-on-one time to either patient, for improvements in cervical, scapular, scapulothoracic and UE control with self care, reaching, carrying, lifting, house/yardwork, driving, computer work. Therapeutic Activities:    [] (12947 or 06540) Provided verbal/tactile cueing for activities related to improving balance, coordination, kinesthetic sense, posture, motor skill, proprioception and motor activation to allow for proper function of scapular, scapulothoracic and UE control with self care, carrying, lifting, driving/computer work.      Home Exercise Program:    [x] (20244) Reviewed/Progressed HEP activities related to strengthening, flexibility, endurance, ROM of scapular, scapulothoracic and UE control with self care, reaching, carrying, lifting, house/yardwork, driving/computer work  [] (23835) Reviewed/Progressed HEP activities related to improving balance, coordination, kinesthetic sense, posture, motor skill, proprioception of scapular, scapulothoracic and UE control with self care, reaching, carrying, lifting, house/yardwork, driving/computer work      Manual Treatments:  PROM / STM / Oscillations-Mobs:  G-I, II, III, IV (PA's, Inf., Post.)  [x] (48837) Provided manual therapy to mobilize soft tissue/joints of cervical/CT, scapular GHJ and UE for the purpose of modulating pain, promoting relaxation,  increasing ROM, reducing/eliminating soft tissue swelling/inflammation/restriction, improving soft tissue extensibility and allowing for proper ROM for normal function with self care, reaching, carrying, lifting, house/yardwork, driving/computer work      Charges:  Timed Code Treatment Minutes: 38   Total Treatment Minutes: 48       [] EVAL - LOW (984 1835)   [] EVAL - MOD (86215)  []

## 2021-05-10 ENCOUNTER — HOSPITAL ENCOUNTER (OUTPATIENT)
Dept: PHYSICAL THERAPY | Age: 46
Setting detail: THERAPIES SERIES
Discharge: HOME | End: 2021-05-10
Payer: COMMERCIAL

## 2021-05-10 PROCEDURE — 97110 THERAPEUTIC EXERCISES: CPT

## 2021-05-10 PROCEDURE — 97140 MANUAL THERAPY 1/> REGIONS: CPT

## 2021-05-10 NOTE — FLOWSHEET NOTE
[address] [facility]  Phone: [phone]   Fax: [phone]    Physical Therapy Treatment Note/ Progress Report:     Date:  5/10/2021    Patient Name:  Dulce Villatoro    :  1975  MRN: [id number]  Restrictions/Precautions:    Medical/Treatment Diagnosis Information:  Diagnosis: S52.124D (ICD-10-CM) - Closed nondisplaced fracture of head of right radius with routine healing, subsequent encounter  Treatment Diagnosis: Decreased R elbow, wrist and shoulder ROM, flexiblity, muscle strength and activation with limitations with ADL's, IADL's, recreational and work activity. Insurance/Certification information:  PT Insurance Information: [place] (24 visits prior to Eloina Valladares)  Physician Information:  Referring Practitioner: Aiyana Cage MD  Plan of care signed (Y/N): []  Yes [x]  No     Date of Patient follow up with Physician:      Progress Report: []  Yes  [x]  No     Date Range for reporting period:  Beginnin/5  Ending: -    Progress report due (10 Rx/or 30 days whichever is less): visit #10 or  (date)     Recertification due (POC duration/ or 90 days whichever is less): visit #12 or 6/15 (date)     Visit # Insurance Allowable Auth required? Date Range   3  (soft limit) [x]  Yes - After visit 24  []  No -     Latex Allergy:  [x]NO      []YES  Preferred Language for Healthcare:   [x]English       []other:    Functional Scale:       Date assessed:  Quick DASH: raw score = 43; dysfunction = 72%       Pain level:  5/10 rest    SUBJECTIVE: Pt reports gradual improvements in R forearm/prox radius pain and ache over the weekend. HEP going well and is not causing increased symptoms when performing.       Dr. Rafael Simmons     OBJECTIVE:    Observation:     21  RT elbow extension  AAROM  -7   Test measurements:      RESTRICTIONS/PRECAUTIONS: NWB until     Exercises/Interventions:   Therapeutic Exercise (83039)  20' Resistance / level Sets / Seconds  Reps verbal/tactile cueing for activities related to strengthening, flexibility, endurance, ROM  for improvements in scapular, scapulothoracic and UE control with self care, reaching, carrying, lifting, house/yardwork, driving/computer work.    [] (77075) Provided verbal/tactile cueing for activities related to improving balance, coordination, kinesthetic sense, posture, motor skill, proprioception  to assist with  scapular, scapulothoracic and UE control with self care, reaching, carrying, lifting, house/yardwork, driving/computer work.  [] (12325) Therapist is in constant attendance of 2 or more patients providing skilled therapy interventions, but not providing any significant amount of measurable one-on-one time to either patient, for improvements in cervical, scapular, scapulothoracic and UE control with self care, reaching, carrying, lifting, house/yardwork, driving, computer work. Therapeutic Activities:    [] (96267 or 05082) Provided verbal/tactile cueing for activities related to improving balance, coordination, kinesthetic sense, posture, motor skill, proprioception and motor activation to allow for proper function of scapular, scapulothoracic and UE control with self care, carrying, lifting, driving/computer work.      Home Exercise Program:    [x] (68050) Reviewed/Progressed HEP activities related to strengthening, flexibility, endurance, ROM of scapular, scapulothoracic and UE control with self care, reaching, carrying, lifting, house/yardwork, driving/computer work  [] (13118) Reviewed/Progressed HEP activities related to improving balance, coordination, kinesthetic sense, posture, motor skill, proprioception of scapular, scapulothoracic and UE control with self care, reaching, carrying, lifting, house/yardwork, driving/computer work      Manual Treatments:  PROM / STM / Oscillations-Mobs:  G-I, II, III, IV (PA's, Inf., Post.)  [x] (90729) Provided manual therapy to mobilize soft tissue/joints of cervical/CT, scapular GHJ and UE for the purpose of modulating pain, promoting relaxation,  increasing ROM, reducing/eliminating soft tissue swelling/inflammation/restriction, improving soft tissue extensibility and allowing for proper ROM for normal function with self care, reaching, carrying, lifting, house/yardwork, driving/computer work      Charges:  Timed Code Treatment Minutes: 38   Total Treatment Minutes: 48       [] EVAL - LOW (22456)   [] EVAL - MOD (88366)  [] EVAL - HIGH (85955)  [] RE-EVAL (72572)  [x] BG(29067) x 2     [] Ionto  [] NMR (70885) x      [] Vaso  [x] Manual (37898) x 1     [] Ultrasound:  [] TA x 1      [] Mech Traction (86253)  [] Home Management Training x    [] ES (un) (60621):   [] Aquatic    [] ES(attended) (94389)  [] Other:                     GOALS:  Patient stated goal: get back to prior activity and UE usage without issues  []? Progressing: []? Met: []? Not Met: []? Adjusted     Therapist goals for Patient:   Short Term Goals: To be achieved in: 2 weeks  1. Independent in HEP and progression per patient tolerance, in order to prevent re-injury. []? Progressing: []? Met: []? Not Met: []? Adjusted  2. Patient will have a decrease in pain to facilitate improvement in movement, function, and ADLs as indicated by Functional Deficits. []? Progressing: []? Met: []? Not Met: []? Adjusted     Long Term Goals: To be achieved in: 6 weeks  1. Disability index score of 10% or less for the UEFI or Quick DASH to assist with reaching prior level of function. []? Progressing: []? Met: []? Not Met: []? Adjusted  2. Patient will demonstrate increased AROM to 0-140 elbow ROM to allow for proper joint functioning as indicated by patients Functional Deficits. []? Progressing: []? Met: []? Not Met: []? Adjusted  3. Patient will demonstrate an increase in Strength to 4+ to allow for proper functional mobility as indicated by patients Functional Deficits. []? Progressing: []? Met: []?  Not Met: []? Adjusted  4. Patient will return to functional activities including full work activity without increased symptoms or restriction. []? Progressing: []? Met: []? Not Met: []? Adjusted  5. Patient will get back to driving without issues or limitations from R UE.     []? Progressing: []? Met: []? Not Met: []? Adjusted         Overall Progression Towards Functional goals/ Treatment Progress Update:  [] Patient is progressing as expected towards functional goals listed. [] Progression is slowed due to complexities/Impairments listed. [] Progression has been slowed due to co-morbidities. [x] Plan just implemented, too soon to assess goals progression <30days   [] Goals require adjustment due to lack of progress  [] Patient is not progressing as expected and requires additional follow up with physician  [] Other    Persisting Functional Limitations/Impairments:  []Sitting []Standing   [x]Transfers  [x]Sleeping   [x]Reaching [x]Lifting   [x]ADLs [x]Housework  [x]Driving [x]Job related tasks  []Sports/Recreation []Other:    ASSESSMENT:  Pt slow and apprehensive with R UE AROM this date and pt reports lateral epicondyle irritation and soreness with TE's this date. Despite symptoms in R lat epicondyle, pt states pt does not exceed 3-4/10. Initiated A/A strengthening this date for bicep and SA with special attention to maintain comfortable and appropriate ranges. Pt making slow steady gains in elbow rom today.      Treatment/Activity Tolerance:  [x] Pt able to complete treatment [] Patient limited by fatique  [] Patient limited by pain  [] Patient limited by other medical complications  [] Other:     Prognosis: - [x] Good [] Fair  [] Poor    Patient Requires Follow-up: [x] Yes  [] No    Return to Play:    [x]  N/A     []  Stage 1: Intro to Strength   []  Stage 2: Dynamic Strength and Intro to Plyometrics   []  Stage 3: Advanced Plyometrics and Intro to Throwing   []  Stage 4: Sport specific Training/Return to Sport     []  Ready to Return to Play, [employer]   []  Not Ready for Return to Sports   Comments:      PLAN: . PT 2x / week for 6 weeks. [x] Continue per plan of care [] Alter current plan (see comments)  [] Plan of care initiated [] Hold pending MD visit [] Discharge    Electronically signed by: Sergo Gonzalez PTA, [id number]    Note: If patient does not return for scheduled/ recommended follow up visits, this note will serve as a discharge from care along with most recent update on progress.

## 2021-05-12 ENCOUNTER — HOSPITAL ENCOUNTER (OUTPATIENT)
Dept: PHYSICAL THERAPY | Age: 46
Setting detail: THERAPIES SERIES
Discharge: HOME | End: 2021-05-12
Payer: COMMERCIAL

## 2021-05-12 PROCEDURE — 97110 THERAPEUTIC EXERCISES: CPT

## 2021-05-12 PROCEDURE — 97140 MANUAL THERAPY 1/> REGIONS: CPT

## 2021-05-12 NOTE — FLOWSHEET NOTE
 Observation:    5/12: +DeQuirevein's sign, tenderness noted along long thumb extensors on dorsal side of thumb    5/7/21  RT elbow extension  AAROM  -7   Test measurements:      RESTRICTIONS/PRECAUTIONS: NWB until 5/28    Exercises/Interventions:   Therapeutic Exercise (81552)  20' Resistance / level Sets / Seconds  Reps Notes/Cues    Wrist extension/ flexion stretching  Standing elbow extension EOB  20\" 5xea    Wrist supination    Stretching  seated  20\" 5x           Elbow flexion and stretching  15\" 5x ea    Gripping  Yellow Digi  DIP claw flex  Finger Ext      1'        5/10: Digi-Indiv digits; Claw flex(towel squeeze)              scap retractions/ postural cues  10\" 10x 5/10: R UE rested on table to dec ER comp   Supine Ulna/Radial Dev  Elbow bent on towel  1 10x eac  5/10    A/A Bicep Curl  5/10: Neutral/ Pronated    Supine skill crushers  No weight 1 10    Standing bicep curl with supination/pronation NO weight 1 10                         Therapeutic Activities (46885)       Pt education regarding stretching, desk posture, OTC analgesic options (volt. Gel) and progression of stretching etc.                                    Neuromuscular Re-ed (53300)       Supine A/A SA Punch +  Dowel  1 10x  5/10: TE this date                                Manual Intervention (54692) 13'       Shld /GH Mobs       Post Cap mobs       Rhythmic Stabs   5/10: Supine, elbow 90 deg on table    STM lateral thumb/wrist   Light stretching for dorsal thumb musculature  5 min     Elbow flex/ext with wrist sup/pro  2 10    PROM MT Elbow Ext   Pro/Sup  Rad/Uln Dev  Wrist Flex/Ext  10'  5/10: Supine     Modalities: 5/7,5/10: CP x 10' to prox radius; Seated with elbow bent on table     Pt. Education:  -pt educated on diagnosis, prognosis and expectations for rehab  -all pt questions were answered    Home Exercise Program:  Access Code: [email or web address]  URL: [email or web address]  Date: 05/05/2021  Prepared by: Cristela Morton Jimenez    Exercises  Seated Wrist Flexion with Overpressure - 2 x daily - 7 x weekly - 4 reps - 15 hold  Wrist Extension Stretch Pronated - 2 x daily - 7 x weekly - 4 reps - 15 hold  Supported Elbow Flexion Extension PROM - 2 x daily - 7 x weekly - 4 reps - 15 hold  Standing Wrist Extension Stretch - 2 x daily - 7 x weekly - 4 reps - 15 hold  Seated Wrist Supination Stretch - 2 x daily - 7 x weekly - 4 reps - 15 hold      Therapeutic Exercise and NMR EXR  [x] (78684) Provided verbal/tactile cueing for activities related to strengthening, flexibility, endurance, ROM  for improvements in scapular, scapulothoracic and UE control with self care, reaching, carrying, lifting, house/yardwork, driving/computer work.    [] (31863) Provided verbal/tactile cueing for activities related to improving balance, coordination, kinesthetic sense, posture, motor skill, proprioception  to assist with  scapular, scapulothoracic and UE control with self care, reaching, carrying, lifting, house/yardwork, driving/computer work.  [] (55205) Therapist is in constant attendance of 2 or more patients providing skilled therapy interventions, but not providing any significant amount of measurable one-on-one time to either patient, for improvements in cervical, scapular, scapulothoracic and UE control with self care, reaching, carrying, lifting, house/yardwork, driving, computer work. Therapeutic Activities:    [] (87768 or 61510) Provided verbal/tactile cueing for activities related to improving balance, coordination, kinesthetic sense, posture, motor skill, proprioception and motor activation to allow for proper function of scapular, scapulothoracic and UE control with self care, carrying, lifting, driving/computer work.      Home Exercise Program:    [x] (53021) Reviewed/Progressed HEP activities related to strengthening, flexibility, endurance, ROM of scapular, scapulothoracic and UE control with self care, reaching, carrying, lifting, with reaching prior level of function. []? Progressing: []? Met: []? Not Met: []? Adjusted  2. Patient will demonstrate increased AROM to 0-140 elbow ROM to allow for proper joint functioning as indicated by patients Functional Deficits. []? Progressing: []? Met: []? Not Met: []? Adjusted  3. Patient will demonstrate an increase in Strength to 4+ to allow for proper functional mobility as indicated by patients Functional Deficits. []? Progressing: []? Met: []? Not Met: []? Adjusted  4. Patient will return to functional activities including full work activity without increased symptoms or restriction. []? Progressing: []? Met: []? Not Met: []? Adjusted  5. Patient will get back to driving without issues or limitations from R UE.     []? Progressing: []? Met: []? Not Met: []? Adjusted         Overall Progression Towards Functional goals/ Treatment Progress Update:  [] Patient is progressing as expected towards functional goals listed. [] Progression is slowed due to complexities/Impairments listed. [] Progression has been slowed due to co-morbidities. [x] Plan just implemented, too soon to assess goals progression <30days   [] Goals require adjustment due to lack of progress  [] Patient is not progressing as expected and requires additional follow up with physician  [] Other    Persisting Functional Limitations/Impairments:  []Sitting []Standing   [x]Transfers  [x]Sleeping   [x]Reaching [x]Lifting   [x]ADLs [x]Housework  [x]Driving [x]Job related tasks  []Sports/Recreation []Other:    ASSESSMENT:  Modifications with session today as pt reporting increase in pain lateral wrist/thumb musculature with clinical findings consistent with DeQuervain's syndrome with manual techniques added to address symptoms. Pt reports less pain however mild tenderness with modifications to lateral thumb.  Pt was instructed from now until next visit to work on HEP, no progressions however addition of ice to lateral thumb especially at end of the day with work activity as pt's main activity involves repetitive usage of hand and wrist musculature with computer work. Will continue to benefit from PT for full ROM and strength restoration of UE as able. Treatment/Activity Tolerance:  [x] Pt able to complete treatment [] Patient limited by fatique  [] Patient limited by pain  [] Patient limited by other medical complications  [] Other:     Prognosis: - [x] Good [] Fair  [] Poor    Patient Requires Follow-up: [x] Yes  [] No    Return to Play:    [x]  N/A     []  Stage 1: Intro to Strength   []  Stage 2: Dynamic Strength and Intro to Plyometrics   []  Stage 3: Advanced Plyometrics and Intro to Throwing   []  Stage 4: Sport specific Training/Return to Sport     []  Ready to Return to Play, [employer]   []  Not Ready for Return to Sports   Comments:      PLAN: . PT 2x / week for 6 weeks. [x] Continue per plan of care [] Alter current plan (see comments)  [] Plan of care initiated [] Hold pending MD visit [] Discharge    Electronically signed by: Justa Berg PTA, DPT, OMT-C    Note: If patient does not return for scheduled/ recommended follow up visits, this note will serve as a discharge from care along with most recent update on progress.

## 2021-05-17 ENCOUNTER — HOSPITAL ENCOUNTER (OUTPATIENT)
Dept: PHYSICAL THERAPY | Age: 46
Setting detail: THERAPIES SERIES
Discharge: HOME | End: 2021-05-17
Payer: COMMERCIAL

## 2021-05-17 PROCEDURE — 97112 NEUROMUSCULAR REEDUCATION: CPT

## 2021-05-17 PROCEDURE — 97140 MANUAL THERAPY 1/> REGIONS: CPT

## 2021-05-17 PROCEDURE — 97110 THERAPEUTIC EXERCISES: CPT

## 2021-05-17 NOTE — FLOWSHEET NOTE
[address] [facility]  Phone: [phone]   Fax: [phone]    Physical Therapy Treatment Note/ Progress Report:     Date:  2021    Patient Name:  Nidia Kumar    :  1975  MRN: [id number]  Restrictions/Precautions:    Medical/Treatment Diagnosis Information:  Diagnosis: S52.124D (ICD-10-CM) - Closed nondisplaced fracture of head of right radius with routine healing, subsequent encounter  Treatment Diagnosis: Decreased R elbow, wrist and shoulder ROM, flexiblity, muscle strength and activation with limitations with ADL's, IADL's, recreational and work activity. Insurance/Certification information:  PT Insurance Information: [place] (24 visits prior to [facility])  Physician Information:  Referring Practitioner: Mark Barragan MD  Plan of care signed (Y/N): []  Yes [x]  No     Date of Patient follow up with Physician:      Progress Report: []  Yes  [x]  No     Date Range for reporting period:  Beginnin/5  Ending: -    Progress report due (10 Rx/or 30 days whichever is less): visit #10 or  (date)     Recertification due (POC duration/ or 90 days whichever is less): visit #12 or 6/15 (date)     Visit # Insurance Allowable Auth required? Date Range    (soft limit) [x]  Yes - After visit 24  []  No -     Latex Allergy:  [x]NO      []YES  Preferred Language for Healthcare:   [x]English       []other:    Functional Scale:       Date assessed:  Quick DASH: raw score = 43; dysfunction = 72%       Pain level:  4/10 rest    SUBJECTIVE:  Pt reports slight decrease in pain intensity in R thumb since last visit, however, continues to be TTP through musculotendinous tissue. HEP going well with mild soreness and fatigue expressed upon completion through forearm musculature.         OBJECTIVE:    Observation:    : +DeQuirevein's sign, tenderness noted along long thumb extensors on dorsal side of thumb    21  RT elbow extension  AAROM  -7   Test measurements:      RESTRICTIONS/PRECAUTIONS: NWB until 5/28    Exercises/Interventions:   Therapeutic Exercise (67115)  20' Resistance / level Sets / Seconds  Reps Notes/Cues    Wrist extension/ flexion stretching  Standing elbow extension EOB  20\" 5xea    Wrist supination    Stretching  seated  20\" 5x    Supported Ulnar Deviation ROM  Pronated  1 10  5/17:    Elbow flexion and stretching  15\" 5x ea    Gripping  Yellow Digi  DIP claw flex  Finger Ext      1'        5/10: Digi-Indiv digits; Claw flex(towel squeeze)              scap retractions/ postural cues  10\" 10x 5/10: R UE rested on table to dec ER comp   Supine Ulna/Radial Dev  Elbow bent on towel  1 10x eac  5/10    A/A Bicep Curl  5/10: Neutral/ Pronated    Supine skull crushers  No weight 2 10 5/17: Pain-free range    Standing bicep curl with supination/pronation NO weight 2 10                         Therapeutic Activities (89289)       Pt education regarding stretching, desk posture, OTC analgesic options (volt. Gel) and progression of stretching etc.                                    Neuromuscular Re-ed (27116)       Supine A/A SA Punch +  Dowel+1#  3\" 10x      Finger Opposition in neutral palm   1 10x  5/17: Thumb remains inactive   Bent over Row/ Ext  1# ankle wt at wrist  2 10x ea 5/17:                  Manual Intervention (24386) 13'       Shld /GH Mobs       Post Cap mobs       Rhythmic Stabs   5/10: Supine, elbow 90 deg on table    STM lateral thumb/wrist   Light stretching for dorsal thumb musculature  5 min     Elbow flex/ext with wrist sup/pro  2 10    PROM MT Elbow Ext   Pro/Sup  Rad/Uln Dev  Wrist Flex/Ext  5'  5/10: Supine     Modalities: 5/7,5/10, 5/17: CP x 10' to prox radius; Seated with elbow bent on table     Pt. Education:  -pt educated on diagnosis, prognosis and expectations for rehab  -all pt questions were answered    Home Exercise Program:  Access Code: [email or web address]  URL: [email or web address]  Date: 05/05/2021  Prepared by: Rustyor Lundborg    Exercises  Seated Wrist Flexion with Overpressure - 2 x daily - 7 x weekly - 4 reps - 15 hold  Wrist Extension Stretch Pronated - 2 x daily - 7 x weekly - 4 reps - 15 hold  Supported Elbow Flexion Extension PROM - 2 x daily - 7 x weekly - 4 reps - 15 hold  Standing Wrist Extension Stretch - 2 x daily - 7 x weekly - 4 reps - 15 hold  Seated Wrist Supination Stretch - 2 x daily - 7 x weekly - 4 reps - 15 hold      Therapeutic Exercise and NMR EXR  [x] (70119) Provided verbal/tactile cueing for activities related to strengthening, flexibility, endurance, ROM  for improvements in scapular, scapulothoracic and UE control with self care, reaching, carrying, lifting, house/yardwork, driving/computer work.    [] (65836) Provided verbal/tactile cueing for activities related to improving balance, coordination, kinesthetic sense, posture, motor skill, proprioception  to assist with  scapular, scapulothoracic and UE control with self care, reaching, carrying, lifting, house/yardwork, driving/computer work.  [] (72027) Therapist is in constant attendance of 2 or more patients providing skilled therapy interventions, but not providing any significant amount of measurable one-on-one time to either patient, for improvements in cervical, scapular, scapulothoracic and UE control with self care, reaching, carrying, lifting, house/yardwork, driving, computer work. Therapeutic Activities:    [] (99378 or 39835) Provided verbal/tactile cueing for activities related to improving balance, coordination, kinesthetic sense, posture, motor skill, proprioception and motor activation to allow for proper function of scapular, scapulothoracic and UE control with self care, carrying, lifting, driving/computer work.      Home Exercise Program:    [x] (04703) Reviewed/Progressed HEP activities related to strengthening, flexibility, endurance, ROM of scapular, scapulothoracic and UE control with self care, reaching, carrying, address symptoms and improve function. Will continue to benefit from PT for full ROM and strength restoration of UE as able. Treatment/Activity Tolerance:  [x] Pt able to complete treatment [] Patient limited by fatique  [] Patient limited by pain  [] Patient limited by other medical complications  [] Other:     Prognosis: - [x] Good [] Fair  [] Poor    Patient Requires Follow-up: [x] Yes  [] No    Return to Play:    [x]  N/A     []  Stage 1: Intro to Strength   []  Stage 2: Dynamic Strength and Intro to Plyometrics   []  Stage 3: Advanced Plyometrics and Intro to Throwing   []  Stage 4: Sport specific Training/Return to Sport     []  Ready to Return to Play, [employer]   []  Not Ready for Return to Sports   Comments:      PLAN: . PT 2x / week for 6 weeks. [x] Continue per plan of care [] Alter current plan (see comments)  [] Plan of care initiated [] Hold pending MD visit [] Discharge    Electronically signed by: Connie Rosales, PTA [id number]    Note: If patient does not return for scheduled/ recommended follow up visits, this note will serve as a discharge from care along with most recent update on progress.

## 2021-05-19 ENCOUNTER — HOSPITAL ENCOUNTER (OUTPATIENT)
Dept: PHYSICAL THERAPY | Age: 46
Setting detail: THERAPIES SERIES
Discharge: HOME | End: 2021-05-19
Payer: COMMERCIAL

## 2021-05-19 PROCEDURE — 97112 NEUROMUSCULAR REEDUCATION: CPT | Performed by: SPECIALIST/TECHNOLOGIST

## 2021-05-19 PROCEDURE — 97140 MANUAL THERAPY 1/> REGIONS: CPT | Performed by: SPECIALIST/TECHNOLOGIST

## 2021-05-19 PROCEDURE — 97110 THERAPEUTIC EXERCISES: CPT | Performed by: SPECIALIST/TECHNOLOGIST

## 2021-05-19 NOTE — FLOWSHEET NOTE
[address] [facility] Physical Therapy  Phone: [phone]   Fax: [phone]    Physical Therapy Treatment Note/ Progress Report:     Date:  2021    Patient Name:  Maribel Becker    :  1975  MRN: [id number]  Restrictions/Precautions:    Medical/Treatment Diagnosis Information:  Diagnosis: S52.124D (ICD-10-CM) - Closed nondisplaced fracture of head of right radius with routine healing, subsequent encounter  Treatment Diagnosis: Decreased R elbow, wrist and shoulder ROM, flexiblity, muscle strength and activation with limitations with ADL's, IADL's, recreational and work activity. Insurance/Certification information:  PT Insurance Information: [place] (24 visits prior to [facility])  Physician Information:  Referring Practitioner: Carlos Mai MD  Plan of care signed (Y/N): []  Yes [x]  No     Date of Patient follow up with Physician:      Progress Report: []  Yes  [x]  No     Date Range for reporting period:  Beginnin/5  Ending: -    Progress report due (10 Rx/or 30 days whichever is less): visit #10 or  (date)     Recertification due (POC duration/ or 90 days whichever is less): visit #12 or 6/15 (date)     Visit # Insurance Allowable Auth required? Date Range    (soft limit) [x]  Yes - After visit 24  []  No -     Latex Allergy:  [x]NO      []YES  Preferred Language for Healthcare:   [x]English       []other:    Functional Scale:       Date assessed:  Quick DASH: raw score = 43; dysfunction = 72%       Pain level:  4/10 rest    SUBJECTIVE:   Rt thumb continues to be the most tender and across the posterior elbow to touching it.         OBJECTIVE:    Observation:    : +DeQuirevein's sign, tenderness noted along long thumb extensors on dorsal side of thumb    21  RT elbow extension  AAROM  -7   Test measurements:      RESTRICTIONS/PRECAUTIONS: NWB until     Exercises/Interventions:   Therapeutic Exercise (19475)  20' Resistance / level Sets / daily - 7 x weekly - 4 reps - 15 hold  Supported Elbow Flexion Extension PROM - 2 x daily - 7 x weekly - 4 reps - 15 hold  Standing Wrist Extension Stretch - 2 x daily - 7 x weekly - 4 reps - 15 hold  Seated Wrist Supination Stretch - 2 x daily - 7 x weekly - 4 reps - 15 hold      Therapeutic Exercise and NMR EXR  [x] (06832) Provided verbal/tactile cueing for activities related to strengthening, flexibility, endurance, ROM  for improvements in scapular, scapulothoracic and UE control with self care, reaching, carrying, lifting, house/yardwork, driving/computer work.    [] (62853) Provided verbal/tactile cueing for activities related to improving balance, coordination, kinesthetic sense, posture, motor skill, proprioception  to assist with  scapular, scapulothoracic and UE control with self care, reaching, carrying, lifting, house/yardwork, driving/computer work.  [] (70794) Therapist is in constant attendance of 2 or more patients providing skilled therapy interventions, but not providing any significant amount of measurable one-on-one time to either patient, for improvements in cervical, scapular, scapulothoracic and UE control with self care, reaching, carrying, lifting, house/yardwork, driving, computer work. Therapeutic Activities:    [] (76305 or 60432) Provided verbal/tactile cueing for activities related to improving balance, coordination, kinesthetic sense, posture, motor skill, proprioception and motor activation to allow for proper function of scapular, scapulothoracic and UE control with self care, carrying, lifting, driving/computer work.      Home Exercise Program:    [x] (01909) Reviewed/Progressed HEP activities related to strengthening, flexibility, endurance, ROM of scapular, scapulothoracic and UE control with self care, reaching, carrying, lifting, house/yardwork, driving/computer work  [] (29644) Reviewed/Progressed HEP activities related to improving balance, coordination, kinesthetic sense, posture, motor skill, proprioception of scapular, scapulothoracic and UE control with self care, reaching, carrying, lifting, house/yardwork, driving/computer work      Manual Treatments:  PROM / STM / Oscillations-Mobs:  G-I, II, III, IV (PA's, Inf., Post.)  [x] (97840) Provided manual therapy to mobilize soft tissue/joints of cervical/CT, scapular GHJ and UE for the purpose of modulating pain, promoting relaxation,  increasing ROM, reducing/eliminating soft tissue swelling/inflammation/restriction, improving soft tissue extensibility and allowing for proper ROM for normal function with self care, reaching, carrying, lifting, house/yardwork, driving/computer work      Charges:  Timed Code Treatment Minutes: 40   Total Treatment Minutes: 40       [] EVAL - LOW (34249)   [] EVAL - MOD (63453)  [] EVAL - HIGH (12457)  [] RE-EVAL (63530)  [x] JS(80696) x 1     [] Ionto  [x] NMR (83881) x 1    [] Vaso  [x] Manual (17996) x 1    [] Ultrasound:  [] TA x 1      [] Mech Traction (99499)  [] Home Management Training x    [] ES (un) (82428):   [] Aquatic    [] ES(attended) (58566)  [] Other:                     GOALS:  Patient stated goal: get back to prior activity and UE usage without issues  []? Progressing: []? Met: []? Not Met: []? Adjusted     Therapist goals for Patient:   Short Term Goals: To be achieved in: 2 weeks  1. Independent in HEP and progression per patient tolerance, in order to prevent re-injury. []? Progressing: []? Met: []? Not Met: []? Adjusted  2. Patient will have a decrease in pain to facilitate improvement in movement, function, and ADLs as indicated by Functional Deficits. []? Progressing: []? Met: []? Not Met: []? Adjusted     Long Term Goals: To be achieved in: 6 weeks  1. Disability index score of 10% or less for the UEFI or Quick DASH to assist with reaching prior level of function. []? Progressing: []? Met: []? Not Met: []? Adjusted  2.  Patient will demonstrate increased AROM to 0-140 elbow ROM to allow for proper joint functioning as indicated by patients Functional Deficits. []? Progressing: []? Met: []? Not Met: []? Adjusted  3. Patient will demonstrate an increase in Strength to 4+ to allow for proper functional mobility as indicated by patients Functional Deficits. []? Progressing: []? Met: []? Not Met: []? Adjusted  4. Patient will return to functional activities including full work activity without increased symptoms or restriction. []? Progressing: []? Met: []? Not Met: []? Adjusted  5. Patient will get back to driving without issues or limitations from R UE.     []? Progressing: []? Met: []? Not Met: []? Adjusted         Overall Progression Towards Functional goals/ Treatment Progress Update:  [] Patient is progressing as expected towards functional goals listed. [] Progression is slowed due to complexities/Impairments listed. [] Progression has been slowed due to co-morbidities. [x] Plan just implemented, too soon to assess goals progression <30days   [] Goals require adjustment due to lack of progress  [] Patient is not progressing as expected and requires additional follow up with physician  [] Other    Persisting Functional Limitations/Impairments:  []Sitting []Standing   [x]Transfers  [x]Sleeping   [x]Reaching [x]Lifting   [x]ADLs [x]Housework  [x]Driving [x]Job related tasks  []Sports/Recreation []Other:    ASSESSMENT: Gradual progression with R wrist/forearm AROM this date and pt instructed to maintain motion that did not increase baseline pain to improve symptoms and outcomes. Initiated periscapular strengthening this date to improve UE scapulohumeral mechanics to improve function throughout work day and with ADLs with addition of scap retractions and addition to triceps extensions. Pt continues to display mild tenderness and swelling in R thumb and manual interventions were continued to address symptoms and improve function with STM.  Pt had multiple trigger points throughout the forearm that were addressed with STM. Mild moments of increased pain with progression of end rang  Will continue to benefit from PT for full ROM and strength restoration of UE as able. Treatment/Activity Tolerance:  [x] Pt able to complete treatment [] Patient limited by fatique  [] Patient limited by pain  [] Patient limited by other medical complications  [] Other:     Prognosis: - [x] Good [] Fair  [] Poor    Patient Requires Follow-up: [x] Yes  [] No    Return to Play:    [x]  N/A     []  Stage 1: Intro to Strength   []  Stage 2: Dynamic Strength and Intro to Plyometrics   []  Stage 3: Advanced Plyometrics and Intro to Throwing   []  Stage 4: Sport specific Training/Return to Sport     []  Ready to Return to Play, [employer]   []  Not Ready for Return to Sports   Comments:      PLAN: . PT 2x / week for 6 weeks. [x] Continue per plan of care [] Alter current plan (see comments)  [] Plan of care initiated [] Hold pending MD visit [] Discharge    Electronically signed by: Frida Beatty, PTA [id number]    Note: If patient does not return for scheduled/ recommended follow up visits, this note will serve as a discharge from care along with most recent update on progress.

## 2021-05-24 ENCOUNTER — HOSPITAL ENCOUNTER (OUTPATIENT)
Dept: PHYSICAL THERAPY | Age: 46
Setting detail: THERAPIES SERIES
Discharge: HOME | End: 2021-05-24
Payer: COMMERCIAL

## 2021-05-24 ENCOUNTER — OFFICE VISIT (OUTPATIENT)
Dept: ORTHOPEDIC SURGERY | Age: 46
End: 2021-05-24
Payer: COMMERCIAL

## 2021-05-24 VITALS — WEIGHT: 258 LBS | HEIGHT: 68 IN | RESPIRATION RATE: 16 BRPM | BODY MASS INDEX: 39.1 KG/M2

## 2021-05-24 DIAGNOSIS — S52.124D CLOSED NONDISPLACED FRACTURE OF HEAD OF RIGHT RADIUS WITH ROUTINE HEALING, SUBSEQUENT ENCOUNTER: Primary | ICD-10-CM

## 2021-05-24 PROCEDURE — 97140 MANUAL THERAPY 1/> REGIONS: CPT

## 2021-05-24 PROCEDURE — G8427 DOCREV CUR MEDS BY ELIG CLIN: HCPCS | Performed by: ORTHOPAEDIC SURGERY

## 2021-05-24 PROCEDURE — 99212 OFFICE O/P EST SF 10 MIN: CPT | Performed by: ORTHOPAEDIC SURGERY

## 2021-05-24 PROCEDURE — 97110 THERAPEUTIC EXERCISES: CPT

## 2021-05-24 PROCEDURE — G8417 CALC BMI ABV UP PARAM F/U: HCPCS | Performed by: ORTHOPAEDIC SURGERY

## 2021-05-24 PROCEDURE — 1036F TOBACCO NON-USER: CPT | Performed by: ORTHOPAEDIC SURGERY

## 2021-05-24 PROCEDURE — 97112 NEUROMUSCULAR REEDUCATION: CPT

## 2021-05-24 NOTE — FLOWSHEET NOTE
[address] [facility] Physical Therapy  Phone: [phone]   Fax: [phone]    Physical Therapy Treatment Note/ Progress Report:     Date:  2021    Patient Name:  Marny Collet    :  1975  MRN: [id number]  Restrictions/Precautions:    Medical/Treatment Diagnosis Information:  Diagnosis: S52.124D (ICD-10-CM) - Closed nondisplaced fracture of head of right radius with routine healing, subsequent encounter  Treatment Diagnosis: Decreased R elbow, wrist and shoulder ROM, flexiblity, muscle strength and activation with limitations with ADL's, IADL's, recreational and work activity. Insurance/Certification information:  PT Insurance Information: [place] (24 visits prior to [facility])  Physician Information:  Referring Practitioner: Niki Wick MD  Plan of care signed (Y/N): []  Yes [x]  No     Date of Patient follow up with Physician:      Progress Report: []  Yes  [x]  No     Date Range for reporting period:  Beginnin/5  Ending: -    Progress report due (10 Rx/or 30 days whichever is less): visit #10 or  (date)     Recertification due (POC duration/ or 90 days whichever is less): visit #12 or 6/15 (date)     Visit # Insurance Allowable Auth required? Date Range    (soft limit) [x]  Yes - After visit 24  []  No -     Latex Allergy:  [x]NO      []YES  Preferred Language for Healthcare:   [x]English       []other:    Functional Scale:       Date assessed:  Quick DASH: raw score = 43; dysfunction = 72%   5    Pain level:  4/10 rest    SUBJECTIVE: Pt had f/u with MD prior to visit today and had 3 x-rays which revealed good bone healing. MD would like pt to continue with skilled PT for 4 weeks to improve UE ROM, strength and pain levels.      OBJECTIVE:    Observation:    : +DeQuirevein's sign, tenderness noted along long thumb extensors on dorsal side of thumb    21  RT elbow extension  AAROM  -7   Test measurements:      RESTRICTIONS/PRECAUTIONS: NWB until 5/28    Exercises/Interventions:   Therapeutic Exercise (48083)  20' Resistance / level Sets / Seconds  Reps Notes/Cues    Wrist extension/ flexion stretching  Standing elbow extension EOB  20\" 5xea    Wrist supination    Stretching  seated  20\" 5x    Supported Ulnar Deviation ROM  Pronated  1 10  5/17:    Elbow flexion and stretching  15\" 5x ea    Gripping  Yellow/ red Digi  DIP claw flex  Finger Ext      1'  1'      5/10: Digi-Indiv digits; Claw flex(towel squeeze)             Wrist Ext  Wrist Pron/Sup  2# MB   0.5kg MB  2 10 EA  5/24    Supine/ seated Ulna/Radial Dev  Elbow bent on towel  1 15x eac  5/10    A/A Bicep Curl  5/10: Neutral/ Pronated    Supine skull crushers  1# ankle wt  2 10 5/17: Pain-free range    Standing bicep curl with Neutral  1# ankle wt 2 10                         Therapeutic Activities (80369)       Pt education regarding stretching, desk posture, OTC analgesic options (volt. Gel) and progression of stretching etc.                                    Neuromuscular Re-ed (29346)       Supine A/A SA Punch +  1#  Ankle wt 3\" 2x10     Finger Opposition in neutral palm   1 10x  5/17: Thumb remains inactive   Bent over Row/ Ext  1# ankle wt at wrist  3 10x ea 5/17:    scap retractions lime 2 10x 5/19   Manual Intervention (63734) 13'       Shld /GH Mobs       Post Cap mobs       Rhythmic Stabs   5/10: Supine, elbow 90 deg on table    STM lateral thumb/wrist   Light stretching for dorsal thumb musculature  5 min     Elbow flex/ext with wrist sup/pro  2 10    PROM MT Elbow Ext   Pro/Sup  Rad/Uln Dev  Wrist Flex/Ext  5'  5/10: Supine     Modalities: 5/7,5/10, 5/17: CP x 10' to prox radius; Seated with elbow bent on table     Pt. Education:  -pt educated on diagnosis, prognosis and expectations for rehab  -all pt questions were answered    Home Exercise Program:  Access Code: [email or web address]  URL: [email or web address]  Date: 05/05/2021  Prepared by: Tessa Perez    Exercises  Seated Wrist Flexion with Overpressure - 2 x daily - 7 x weekly - 4 reps - 15 hold  Wrist Extension Stretch Pronated - 2 x daily - 7 x weekly - 4 reps - 15 hold  Supported Elbow Flexion Extension PROM - 2 x daily - 7 x weekly - 4 reps - 15 hold  Standing Wrist Extension Stretch - 2 x daily - 7 x weekly - 4 reps - 15 hold  Seated Wrist Supination Stretch - 2 x daily - 7 x weekly - 4 reps - 15 hold      Therapeutic Exercise and NMR EXR  [x] (66938) Provided verbal/tactile cueing for activities related to strengthening, flexibility, endurance, ROM  for improvements in scapular, scapulothoracic and UE control with self care, reaching, carrying, lifting, house/yardwork, driving/computer work.    [] (38485) Provided verbal/tactile cueing for activities related to improving balance, coordination, kinesthetic sense, posture, motor skill, proprioception  to assist with  scapular, scapulothoracic and UE control with self care, reaching, carrying, lifting, house/yardwork, driving/computer work.  [] (32473) Therapist is in constant attendance of 2 or more patients providing skilled therapy interventions, but not providing any significant amount of measurable one-on-one time to either patient, for improvements in cervical, scapular, scapulothoracic and UE control with self care, reaching, carrying, lifting, house/yardwork, driving, computer work. Therapeutic Activities:    [] (37393 or 37207) Provided verbal/tactile cueing for activities related to improving balance, coordination, kinesthetic sense, posture, motor skill, proprioception and motor activation to allow for proper function of scapular, scapulothoracic and UE control with self care, carrying, lifting, driving/computer work.      Home Exercise Program:    [x] (53229) Reviewed/Progressed HEP activities related to strengthening, flexibility, endurance, ROM of scapular, scapulothoracic and UE control with self care, reaching, carrying, lifting, house/yardwork, driving/computer work  [] (83857) Reviewed/Progressed HEP activities related to improving balance, coordination, kinesthetic sense, posture, motor skill, proprioception of scapular, scapulothoracic and UE control with self care, reaching, carrying, lifting, house/yardwork, driving/computer work      Manual Treatments:  PROM / STM / Oscillations-Mobs:  G-I, II, III, IV (PA's, Inf., Post.)  [x] (09996) Provided manual therapy to mobilize soft tissue/joints of cervical/CT, scapular GHJ and UE for the purpose of modulating pain, promoting relaxation,  increasing ROM, reducing/eliminating soft tissue swelling/inflammation/restriction, improving soft tissue extensibility and allowing for proper ROM for normal function with self care, reaching, carrying, lifting, house/yardwork, driving/computer work      Charges:  Timed Code Treatment Minutes: 40   Total Treatment Minutes: 40       [] EVAL - LOW (18489)   [] EVAL - MOD (13464)  [] EVAL - HIGH (53166)  [] RE-EVAL (90752)  [x] JZ(10434) x 1     [] Ionto  [x] NMR (14535) x 1    [] Vaso  [x] Manual (17860) x 1    [] Ultrasound:  [] TA x 1      [] Mech Traction (10070)  [] Home Management Training x    [] ES (un) (98068):   [] Aquatic    [] ES(attended) (46180)  [] Other:                     GOALS:  Patient stated goal: get back to prior activity and UE usage without issues  []? Progressing: []? Met: []? Not Met: []? Adjusted     Therapist goals for Patient:   Short Term Goals: To be achieved in: 2 weeks  1. Independent in HEP and progression per patient tolerance, in order to prevent re-injury. []? Progressing: []? Met: []? Not Met: []? Adjusted  2. Patient will have a decrease in pain to facilitate improvement in movement, function, and ADLs as indicated by Functional Deficits. []? Progressing: []? Met: []? Not Met: []? Adjusted     Long Term Goals: To be achieved in: 6 weeks  1.  Disability index score of 10% or less for the UEFI or Quick DASH to assist with reaching prior level of function. []? Progressing: []? Met: []? Not Met: []? Adjusted  2. Patient will demonstrate increased AROM to 0-140 elbow ROM to allow for proper joint functioning as indicated by patients Functional Deficits. []? Progressing: []? Met: []? Not Met: []? Adjusted  3. Patient will demonstrate an increase in Strength to 4+ to allow for proper functional mobility as indicated by patients Functional Deficits. []? Progressing: []? Met: []? Not Met: []? Adjusted  4. Patient will return to functional activities including full work activity without increased symptoms or restriction. []? Progressing: []? Met: []? Not Met: []? Adjusted  5. Patient will get back to driving without issues or limitations from R UE.     []? Progressing: []? Met: []? Not Met: []? Adjusted         Overall Progression Towards Functional goals/ Treatment Progress Update:  [] Patient is progressing as expected towards functional goals listed. [] Progression is slowed due to complexities/Impairments listed. [] Progression has been slowed due to co-morbidities. [x] Plan just implemented, too soon to assess goals progression <30days   [] Goals require adjustment due to lack of progress  [] Patient is not progressing as expected and requires additional follow up with physician  [] Other    Persisting Functional Limitations/Impairments:  []Sitting []Standing   [x]Transfers  [x]Sleeping   [x]Reaching [x]Lifting   [x]ADLs [x]Housework  [x]Driving [x]Job related tasks  []Sports/Recreation []Other:    ASSESSMENT: Increased UE resistance this date to improve wrist, forearm, and periscapular endurance and stability and pt tolerated increased resistance well without c/o pain exacerbations. Pt instructed to maintain ranges that did not increase baseline symptoms, and demo'd slight deficits with R forearm supination when compared bilaterally.  Pt educated on prognosis and POC and informed that as UE endurance and strength improve,  strengthening will become next focus to decrease functional deficits present in R UE. Will continue to benefit from PT for full ROM and strength restoration of UE as able. Treatment/Activity Tolerance:  [x] Pt able to complete treatment [] Patient limited by fatique  [] Patient limited by pain  [] Patient limited by other medical complications  [] Other:     Prognosis: - [x] Good [] Fair  [] Poor    Patient Requires Follow-up: [x] Yes  [] No    Return to Play:    [x]  N/A     []  Stage 1: Intro to Strength   []  Stage 2: Dynamic Strength and Intro to Plyometrics   []  Stage 3: Advanced Plyometrics and Intro to Throwing   []  Stage 4: Sport specific Training/Return to Sport     []  Ready to Return to Play, [employer]   []  Not Ready for Return to Sports   Comments:      PLAN: . PT 2x / week for 6 weeks. [x] Continue per plan of care [] Alter current plan (see comments)  [] Plan of care initiated [] Hold pending MD visit [] Discharge    Electronically signed by: Alexandru Tracey, PTA [id number]    Note: If patient does not return for scheduled/ recommended follow up visits, this note will serve as a discharge from care along with most recent update on progress.

## 2021-05-24 NOTE — PROGRESS NOTES
Omega Nash MD  [place] [address]  [address]  [address] [facility]  Kalpesh Sandersdenton Woodard  [phone]  Encounter Date: 5/24/2021  YOB: 1975    Chief Complaint   Patient presents with    Follow-up     right elbow fracture DOI 4/16/2021       History:Mr. Mike Woodard is here in follow up regarding his right elbow fracture on 4/16/2021. He feels that his elbow is getting better slowly. His pain increases with turning out his arm out, as well as stretching it out. He has been working with therapy twice weekly and would like to discuss how many more sessions will be needed. 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 He plans on returning to driving in August.         Exam:  Resp 16   Ht 5' 8\" (1.727 m)   Wt 258 lb (117 kg)   BMI 39.23 kg/m²   General: Alert and oriented x3, No acute distress, Cooperative and conversant. Obesity Present  Mood and affect are appropriate. Right Elbow : Inspection: Normal carrying angle. No swelling. No erythema or ecchymosis. Normal muscle contours of the arm and forearm. Palpation:    No tenderness along the medial epicondyle   Mild tenderness along the lateral epicondyle   No tenderness over olecranon   Mild tenderness of radial head     Range of Motion:    Extension: Loss of 10°    Flexion: 100°    Pronation 90°    Supination 65°     Strength:    Flexion 4/5.  Extension 4/5.  Wrist flexion and extension 4/5     Special Tests: Tinel's negative at the cubital tunnel negative. Tennis elbow testing negative. Varus or valgus instability negative. Skin: No rashes or lesions     Gait: Tandem gait. Radiology:     X-rays obtained today and reviewed in office:  Views 3: right elbow   Impression: No evidence for acute fracture, subluxation, or dislocation.  No lytic or blastic lesions in the metaphyseal regions. Good interval healing right radial head       Assessment:   Diagnosis Orders   1. Closed nondisplaced fracture of head of right radius with routine healing, subsequent encounter  XR ELBOW RIGHT (MIN 3 VIEWS)       Orders  Orders Placed This Encounter   Procedures    XR ELBOW RIGHT (MIN 3 VIEWS)       Plan:  I have discussed treatment options with the patient. After reviewing his x-rays, his bone is healing great. It could still be sore due to the injury. I believe that he would continue to make benefits from continuing formal physical therapy for at least 4 more weeks. 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 He will follow up in office in 4 weeks to check his progress. He understands and accepts this course of care. By signing my name below, Miguel Castellanos, attest that this documentation has been prepared under the direction and in the presence of Salima Mitchell MD.   Electronically Signed: Kaz Najera, 5/24/21, 8:16 AM EDT. Nano Flannery MD, personally performed the services described in this documentation. All medical record entries made by the shailaiblisa were at my direction and in my presence. I have reviewed the chart and discharge instructions (if applicable) and agree that the record reflects my personal performance and is accurate and complete. Salima Mitchell MD, 6/6/21, 3:16 PM EDT. Documentation was done using voice recognition dragon software. Every effort was made to ensure accuracy; however, inadvertent  Unintentional computerized transcription errors may be present.

## 2021-05-26 ENCOUNTER — HOSPITAL ENCOUNTER (OUTPATIENT)
Dept: PHYSICAL THERAPY | Age: 46
Setting detail: THERAPIES SERIES
Discharge: HOME | End: 2021-05-26
Payer: COMMERCIAL

## 2021-05-26 PROCEDURE — 97112 NEUROMUSCULAR REEDUCATION: CPT

## 2021-05-26 PROCEDURE — 97140 MANUAL THERAPY 1/> REGIONS: CPT

## 2021-05-26 PROCEDURE — 97110 THERAPEUTIC EXERCISES: CPT

## 2021-05-26 NOTE — FLOWSHEET NOTE
[address] [facility]  Phone: [phone]   Fax: [phone]    Physical Therapy Treatment Note/ Progress Report:     Date:  2021    Patient Name:  Nidia Kumar    :  1975  MRN: [id number]  Restrictions/Precautions:    Medical/Treatment Diagnosis Information:  Diagnosis: S52.124D (ICD-10-CM) - Closed nondisplaced fracture of head of right radius with routine healing, subsequent encounter  Treatment Diagnosis: Decreased R elbow, wrist and shoulder ROM, flexiblity, muscle strength and activation with limitations with ADL's, IADL's, recreational and work activity. Insurance/Certification information:  PT Insurance Information: [place] (24 visits prior to John Silva)  Physician Information:  Referring Practitioner: Mark Barragan MD  Plan of care signed (Y/N): []  Yes [x]  No     Date of Patient follow up with Physician:      Progress Report: []  Yes  [x]  No     Date Range for reporting period:  Beginnin/5  Ending: -    Progress report due (10 Rx/or 30 days whichever is less): visit #10 or  (date)     Recertification due (POC duration/ or 90 days whichever is less): visit #12 or 6/15 (date)     Visit # Insurance Allowable Auth required? Date Range   8  (soft limit) [x]  Yes - After visit 24  []  No -     Latex Allergy:  [x]NO      []YES  Preferred Language for Healthcare:   [x]English       []other:    Functional Scale:       Date assessed:  Quick DASH: raw score = 43; dysfunction = 72%       Pain level:  2-3/10 rest    SUBJECTIVE:  Pt reports he continues to do well and noting progress with decreased pain and increased mobility. Pt reports he continues to have issues with quick and novel movements as well as repetitive activity with increased pain but does not last as long and is not as severe.      OBJECTIVE:    Observation:    : +DeQuirevein's sign, tenderness noted along long thumb extensors on dorsal side of thumb    21  RT elbow extension  AAROM  -7   Test measurements:      RESTRICTIONS/PRECAUTIONS: NWB until 5/28    Exercises/Interventions:   Therapeutic Exercise (85675)  20' Resistance / level Sets / Seconds  Reps Notes/Cues    Wrist extension/ flexion stretching  Standing elbow extension EOB     Wrist supination    Stretching  seated     Supported Ulnar Deviation ROM  Pronated  5/17:    Elbow flexion and stretching     Gripping         5/10: Digi-Indiv digits; Claw flex(towel squeeze)             Wrist Ext  Wrist Pron/Sup  2# MB   0.5kg MB  2 10 EA  5/24     5/10: Neutral/ Pronated    Supine skull crushers  1# hand weight  2 10 5/17: Pain-free range    Standing bicep curl with Neutral  2# hand weight 2 10    Bicep curl with wrist rotation 2# med ball 2 10                  Therapeutic Activities (85442)       Pt education regarding stretching, desk posture, OTC analgesic options (volt. Gel) and progression of stretching etc.        Line walk holding weight  -standard   -Lumbrical  3# 15' x2 each                         Neuromuscular Re-ed (28418)       Supine A/A SA Punch +  2# hand weight 3\" 2x10     Finger Opposition in neutral palm   1 10x  5/17: Thumb remains inactive   Bent over Row/ Ext  1# ankle wt at wrist  3 10x ea 5/17: 5/26 HELD   scap retractions lime 2 10x 5/19   B shoulder ext with scap squeeze lime 2 10                                Triceps extensions  lime 2 10x 5/19   Manual Intervention (74469) 13'       Shld /GH Mobs       Post Cap mobs       Rhythmic Stabs   5/10: Supine, elbow 90 deg on table    STM lateral thumb/wrist   Light stretching for dorsal thumb musculature (5/26 with IASTM)  5 min     Elbow flex/ext with wrist sup/pro  2 10    PROM MT Elbow Ext   Pro/Sup  Rad/Uln Dev  Wrist Flex/Ext  5'  5/10: Supine     Modalities: 5/7,5/10, 5/17, 5/26: CP x 10' to prox radius; Seated with elbow bent on table     Pt.  Education:  -pt educated on diagnosis, prognosis and expectations for rehab  -all pt questions strengthening, flexibility, endurance, ROM of scapular, scapulothoracic and UE control with self care, reaching, carrying, lifting, house/yardwork, driving/computer work  [] (27522) Reviewed/Progressed HEP activities related to improving balance, coordination, kinesthetic sense, posture, motor skill, proprioception of scapular, scapulothoracic and UE control with self care, reaching, carrying, lifting, house/yardwork, driving/computer work      Manual Treatments:  PROM / STM / Oscillations-Mobs:  G-I, II, III, IV (PA's, Inf., Post.)  [x] (66984) Provided manual therapy to mobilize soft tissue/joints of cervical/CT, scapular GHJ and UE for the purpose of modulating pain, promoting relaxation,  increasing ROM, reducing/eliminating soft tissue swelling/inflammation/restriction, improving soft tissue extensibility and allowing for proper ROM for normal function with self care, reaching, carrying, lifting, house/yardwork, driving/computer work      Charges:  Timed Code Treatment Minutes: 43   Total Treatment Minutes: 53       [] EVAL - LOW (17732)   [] EVAL - MOD (72172)  [] EVAL - HIGH (43183)  [] RE-EVAL (77661)  [x] DE(98051) x 1     [] Ionto  [x] NMR (64462) x 1    [] Vaso  [x] Manual (03467) x 1    [] Ultrasound:  [] TA x 1      [] Mech Traction (61085)  [] Home Management Training x    [] ES (un) (01228):   [] Aquatic    [] ES(attended) (07816)  [] Other:                     GOALS:  Patient stated goal: get back to prior activity and UE usage without issues  []? Progressing: []? Met: []? Not Met: []? Adjusted     Therapist goals for Patient:   Short Term Goals: To be achieved in: 2 weeks  1. Independent in HEP and progression per patient tolerance, in order to prevent re-injury. []? Progressing: []? Met: []? Not Met: []? Adjusted  2. Patient will have a decrease in pain to facilitate improvement in movement, function, and ADLs as indicated by Functional Deficits. []? Progressing: []? Met: []?  Not Met: []? Adjusted     Long Term Goals: To be achieved in: 6 weeks  1. Disability index score of 10% or less for the UEFI or Quick DASH to assist with reaching prior level of function. []? Progressing: []? Met: []? Not Met: []? Adjusted  2. Patient will demonstrate increased AROM to 0-140 elbow ROM to allow for proper joint functioning as indicated by patients Functional Deficits. []? Progressing: []? Met: []? Not Met: []? Adjusted  3. Patient will demonstrate an increase in Strength to 4+ to allow for proper functional mobility as indicated by patients Functional Deficits. []? Progressing: []? Met: []? Not Met: []? Adjusted  4. Patient will return to functional activities including full work activity without increased symptoms or restriction. []? Progressing: []? Met: []? Not Met: []? Adjusted  5. Patient will get back to driving without issues or limitations from R UE.     []? Progressing: []? Met: []? Not Met: []? Adjusted         Overall Progression Towards Functional goals/ Treatment Progress Update:  [] Patient is progressing as expected towards functional goals listed. [] Progression is slowed due to complexities/Impairments listed. [] Progression has been slowed due to co-morbidities. [x] Plan just implemented, too soon to assess goals progression <30days   [] Goals require adjustment due to lack of progress  [] Patient is not progressing as expected and requires additional follow up with physician  [] Other    Persisting Functional Limitations/Impairments:  []Sitting []Standing   [x]Transfers  [x]Sleeping   [x]Reaching [x]Lifting   [x]ADLs [x]Housework  [x]Driving [x]Job related tasks  []Sports/Recreation []Other:    ASSESSMENT: Progressions with interventions as noted per bolded areas per log, with increased focus on variations of gripping activity which were tolerated well with minimal reports of lateral elbow discomfort however overall not limiting with interventions.  Progressing well with overall ROM

## 2021-05-27 ENCOUNTER — OFFICE VISIT (OUTPATIENT)
Dept: PRIMARY CARE CLINIC | Age: 46
End: 2021-05-27
Payer: COMMERCIAL

## 2021-05-27 VITALS
WEIGHT: 261 LBS | BODY MASS INDEX: 39.56 KG/M2 | HEART RATE: 65 BPM | OXYGEN SATURATION: 99 % | TEMPERATURE: 97.1 F | HEIGHT: 68 IN | DIASTOLIC BLOOD PRESSURE: 63 MMHG | SYSTOLIC BLOOD PRESSURE: 117 MMHG

## 2021-05-27 DIAGNOSIS — J45.20 MILD INTERMITTENT ASTHMA WITHOUT COMPLICATION: ICD-10-CM

## 2021-05-27 DIAGNOSIS — R55 SYNCOPE, UNSPECIFIED SYNCOPE TYPE: Primary | ICD-10-CM

## 2021-05-27 DIAGNOSIS — R29.898 LEFT LEG WEAKNESS: ICD-10-CM

## 2021-05-27 DIAGNOSIS — Z00.00 ROUTINE ADULT HEALTH MAINTENANCE: ICD-10-CM

## 2021-05-27 DIAGNOSIS — K21.9 GASTROESOPHAGEAL REFLUX DISEASE, UNSPECIFIED WHETHER ESOPHAGITIS PRESENT: ICD-10-CM

## 2021-05-27 DIAGNOSIS — R06.83 SNORING: ICD-10-CM

## 2021-05-27 DIAGNOSIS — E66.9 CLASS 2 OBESITY WITH BODY MASS INDEX (BMI) OF 39.0 TO 39.9 IN ADULT, UNSPECIFIED OBESITY TYPE, UNSPECIFIED WHETHER SERIOUS COMORBIDITY PRESENT: ICD-10-CM

## 2021-05-27 PROCEDURE — 99213 OFFICE O/P EST LOW 20 MIN: CPT | Performed by: INTERNAL MEDICINE

## 2021-05-27 PROCEDURE — G8427 DOCREV CUR MEDS BY ELIG CLIN: HCPCS | Performed by: INTERNAL MEDICINE

## 2021-05-27 PROCEDURE — G8417 CALC BMI ABV UP PARAM F/U: HCPCS | Performed by: INTERNAL MEDICINE

## 2021-05-27 PROCEDURE — 1036F TOBACCO NON-USER: CPT | Performed by: INTERNAL MEDICINE

## 2021-05-27 PROCEDURE — 93000 ELECTROCARDIOGRAM COMPLETE: CPT | Performed by: INTERNAL MEDICINE

## 2021-05-27 ASSESSMENT — PATIENT HEALTH QUESTIONNAIRE - PHQ9
2. FEELING DOWN, DEPRESSED OR HOPELESS: 0
SUM OF ALL RESPONSES TO PHQ QUESTIONS 1-9: 0
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
## 2021-06-02 ENCOUNTER — HOSPITAL ENCOUNTER (OUTPATIENT)
Dept: PHYSICAL THERAPY | Age: 46
Setting detail: THERAPIES SERIES
Discharge: HOME | End: 2021-06-02
Payer: COMMERCIAL

## 2021-06-02 PROCEDURE — 97112 NEUROMUSCULAR REEDUCATION: CPT

## 2021-06-02 PROCEDURE — 97110 THERAPEUTIC EXERCISES: CPT

## 2021-06-02 PROCEDURE — 97140 MANUAL THERAPY 1/> REGIONS: CPT

## 2021-06-02 NOTE — PROGRESS NOTES
[address] [facility]  Phone: [phone]   Fax: [phone]    Physical Therapy Treatment Note/ Progress Report:     Date:  2021    Patient Name:  Dany Keys    :  1975  MRN: [id number]  Restrictions/Precautions:    Medical/Treatment Diagnosis Information:  Diagnosis: S52.124D (ICD-10-CM) - Closed nondisplaced fracture of head of right radius with routine healing, subsequent encounter  Treatment Diagnosis: Decreased R elbow, wrist and shoulder ROM, flexiblity, muscle strength and activation with limitations with ADL's, IADL's, recreational and work activity. Insurance/Certification information:  PT Insurance Information: [employer] (24 visits prior to [facility])  Physician Information:  Referring Practitioner: Eleanor Vallejo MD  Plan of care signed (Y/N): []  Yes [x]  No     Date of Patient follow up with Physician:      Progress Report: [x]  Yes  []  No     Date Range for reporting period:  Beginnin/5  Ending: -    Progress report due (10 Rx/or 30 days whichever is less): visit #10 or  (date)     Recertification due (POC duration/ or 90 days whichever is less): visit #12 or 6/15 (date)     Visit # Insurance Allowable Auth required? Date Range   9  (soft limit) [x]  Yes - After visit 24  []  No -     Latex Allergy:  [x]NO      []YES  Preferred Language for Healthcare:   [x]English       []other:    Functional Scale:       Date assessed:  Quick DASH: raw score = 43; dysfunction = 72%     Quick DASH: raw score= 24; dysfunction = 30%        Pain level:  2-3/10 rest    SUBJECTIVE:  Pt reports good tolerance to previous session, however, continues to report mild tenderness to lateral epicondyle and R thumb.  Pt reports pain and tenderness in R thumb and thenar eminence       OBJECTIVE:    Observation:    : +DeQuirevein's sign, tenderness noted along long thumb extensors on dorsal side of thumb    21  RT elbow extension  AAROM  -7   Test measurements:      6/2/2021   PROM AROM     L R L R   Cervical Flexion            Cervical Extension            Cervical Rotation           Cervical Side-bend           Shoulder Flexion      135   Shoulder Abduction      145   Shoulder External Rotation      50   Shoulder Internal Rotation      65   Elbow Flexion    143 135   Elbow Extension    0 Lack 8 from neutral *   Pronation      90*   Supination      63*   Wrist Flexion        WFL *   Wrist Extension        WFL*   Radial Deviation                RESTRICTIONS/PRECAUTIONS: NWB until 5/28    Exercises/Interventions:   Therapeutic Exercise (44772)  20' Resistance / level Sets / Seconds  Reps Notes/Cues    Wrist extension/ flexion stretching  Standing elbow extension EOB     Wrist supination    Stretching  seated     Supported Ulnar Deviation ROM  Pronated  5/17:    Elbow flexion and stretching     Gripping         5/10: Digi-Indiv digits; Claw flex(towel squeeze)             Wrist Ext  Wrist Pron/Sup  2# MB   0.5kg MB  2 10 EA  5/24     5/10: Neutral/ Pronated    Supine skull crushers  2# MB 2 10 5/17: Pain-free range    Standing bicep curl with Neutral  2# hand weight 2 10    Bicep curl with wrist rotation 2# med ball 2 10                  Therapeutic Activities (87087)       Pt education regarding stretching, desk posture, OTC analgesic options (volt.  Gel) and progression of stretching etc.                      Neuromuscular Re-ed (51756)       Supine A/A SA Punch +  2# hand weight 3\" 2x10     B shoulder ext with scap squeeze lime 2 10                                Triceps extensions  lime 2 10x 5/19   Manual Intervention (36474) 13'       Shld /GH Mobs       Post Cap mobs       Rhythmic Stabs   5/10: Supine, elbow 90 deg on table    STM lateral thumb/wrist   Light stretching for dorsal thumb musculature (5/26 with IASTM)  5 min     Elbow flex/ext with wrist sup/pro  2 10    PROM MT Elbow Ext   Pro/Sup  Rad/Uln Dev  Wrist Flex/Ext  5'  5/10: Supine Modalities: 5/7,5/10, 5/17, 5/26: CP x 10' to prox radius; Seated with elbow bent on table     Pt. Education:  -pt educated on diagnosis, prognosis and expectations for rehab  -all pt questions were answered    Home Exercise Program:  Access Code: [email or web address]  URL: [email or web address]  Date: 05/05/2021  Prepared by: Chey Porter    Exercises  Seated Wrist Flexion with Overpressure - 2 x daily - 7 x weekly - 4 reps - 15 hold  Wrist Extension Stretch Pronated - 2 x daily - 7 x weekly - 4 reps - 15 hold  Supported Elbow Flexion Extension PROM - 2 x daily - 7 x weekly - 4 reps - 15 hold  Standing Wrist Extension Stretch - 2 x daily - 7 x weekly - 4 reps - 15 hold  Seated Wrist Supination Stretch - 2 x daily - 7 x weekly - 4 reps - 15 hold      Therapeutic Exercise and NMR EXR  [x] (63089) Provided verbal/tactile cueing for activities related to strengthening, flexibility, endurance, ROM  for improvements in scapular, scapulothoracic and UE control with self care, reaching, carrying, lifting, house/yardwork, driving/computer work.    [] (60796) Provided verbal/tactile cueing for activities related to improving balance, coordination, kinesthetic sense, posture, motor skill, proprioception  to assist with  scapular, scapulothoracic and UE control with self care, reaching, carrying, lifting, house/yardwork, driving/computer work.  [] (89773) Therapist is in constant attendance of 2 or more patients providing skilled therapy interventions, but not providing any significant amount of measurable one-on-one time to either patient, for improvements in cervical, scapular, scapulothoracic and UE control with self care, reaching, carrying, lifting, house/yardwork, driving, computer work.      Therapeutic Activities:    [] (16779 or 76634) Provided verbal/tactile cueing for activities related to improving balance, coordination, kinesthetic sense, posture, motor skill, proprioception and motor activation to allow for proper function of scapular, scapulothoracic and UE control with self care, carrying, lifting, driving/computer work. Home Exercise Program:    [x] (61337) Reviewed/Progressed HEP activities related to strengthening, flexibility, endurance, ROM of scapular, scapulothoracic and UE control with self care, reaching, carrying, lifting, house/yardwork, driving/computer work  [] (95106) Reviewed/Progressed HEP activities related to improving balance, coordination, kinesthetic sense, posture, motor skill, proprioception of scapular, scapulothoracic and UE control with self care, reaching, carrying, lifting, house/yardwork, driving/computer work      Manual Treatments:  PROM / STM / Oscillations-Mobs:  G-I, II, III, IV (PA's, Inf., Post.)  [x] (75730) Provided manual therapy to mobilize soft tissue/joints of cervical/CT, scapular GHJ and UE for the purpose of modulating pain, promoting relaxation,  increasing ROM, reducing/eliminating soft tissue swelling/inflammation/restriction, improving soft tissue extensibility and allowing for proper ROM for normal function with self care, reaching, carrying, lifting, house/yardwork, driving/computer work      Charges:  Timed Code Treatment Minutes: 45   Total Treatment Minutes: 45       [] EVAL - LOW (65930)   [] EVAL - MOD (08059)  [] EVAL - HIGH (63865)  [] RE-EVAL (97381)  [x] SW(44290) x 1     [] Ionto  [x] NMR (55048) x 1    [] Vaso  [x] Manual (53379) x 1    [] Ultrasound:  [] TA x 1      [] Mech Traction (51711)  [] Home Management Training x    [] ES (un) (26677):   [] Aquatic    [] ES(attended) (39907)  [] Other:                     GOALS:  Patient stated goal: get back to prior activity and UE usage without issues  []? Progressing: []? Met: []? Not Met: []? Adjusted     Therapist goals for Patient:   Short Term Goals: To be achieved in: 2 weeks  1. Independent in HEP and progression per patient tolerance, in order to prevent re-injury. []? Progressing: []? Met: []?  Not Met: []? Adjusted  2. Patient will have a decrease in pain to facilitate improvement in movement, function, and ADLs as indicated by Functional Deficits. []? Progressing: []? Met: []? Not Met: []? Adjusted     Long Term Goals: To be achieved in: 6 weeks  1. Disability index score of 10% or less for the UEFI or Quick DASH to assist with reaching prior level of function. [x]? Progressing: []? Met: []? Not Met: []? Adjusted  2. Patient will demonstrate increased AROM to 0-140 elbow ROM to allow for proper joint functioning as indicated by patients Functional Deficits. [x]? Progressing: []? Met: []? Not Met: []? Adjusted  3. Patient will demonstrate an increase in Strength to 4+ to allow for proper functional mobility as indicated by patients Functional Deficits. [x]? Progressing: []? Met: []? Not Met: []? Adjusted  4. Patient will return to functional activities including full work activity without increased symptoms or restriction. [x]? Progressing: []? Met: []? Not Met: []? Adjusted  5. Patient will get back to driving without issues or limitations from R UE.     [x]? Progressing: []? Met: []? Not Met: []? Adjusted         Overall Progression Towards Functional goals/ Treatment Progress Update:  [x] Patient is progressing as expected towards functional goals listed. [] Progression is slowed due to complexities/Impairments listed. [] Progression has been slowed due to co-morbidities. [] Plan just implemented, too soon to assess goals progression <30days   [] Goals require adjustment due to lack of progress  [] Patient is not progressing as expected and requires additional follow up with physician  [] Other    Persisting Functional Limitations/Impairments:  []Sitting []Standing   [x]Transfers  []Sleeping   [x]Reaching [x]Lifting   [x]ADLs [x]Housework  [x]Driving [x]Job related tasks  []Sports/Recreation []Other:    ASSESSMENT: Time taken during treatment session to obtain objective measurements for PN.  Pt improving AROM in all planes with shoulder and elbow, however, deficits still present and pt struggles to improve AROM supination 2/2 tissue restriction and pain at lateral epicondyle. Significant functional improvements noted with QuickDASH self reported survey this date, although pt relies on L UE to assist with household activities. Progressing well with overall ROM and strength, will benefit from continuation of PT to maximize ROM and strength activity for LTG achievement. Treatment/Activity Tolerance:  [x] Pt able to complete treatment [] Patient limited by fatique  [] Patient limited by pain  [] Patient limited by other medical complications  [] Other:     Prognosis: - [x] Good [] Fair  [] Poor    Patient Requires Follow-up: [x] Yes  [] No    Return to Play:    [x]  N/A     []  Stage 1: Intro to Strength   []  Stage 2: Dynamic Strength and Intro to Plyometrics   []  Stage 3: Advanced Plyometrics and Intro to Throwing   []  Stage 4: Sport specific Training/Return to Sport     []  Ready to Return to Play, [employer]   []  Not Ready for Return to Sports   Comments:      PLAN: . PT 2x / week for 6 weeks. [x] Continue per plan of care [] Alter current plan (see comments)  [] Plan of care initiated [] Hold pending MD visit [] Discharge    Electronically signed by: Joseph Lazar, PTA [id number]    Note: If patient does not return for scheduled/ recommended follow up visits, this note will serve as a discharge from care along with most recent update on progress.

## 2021-06-07 ENCOUNTER — HOSPITAL ENCOUNTER (EMERGENCY)
Age: 46
Discharge: HOME | End: 2021-06-07
Payer: COMMERCIAL

## 2021-06-07 ENCOUNTER — HOSPITAL ENCOUNTER (OUTPATIENT)
Dept: PHYSICAL THERAPY | Age: 46
Setting detail: THERAPIES SERIES
Discharge: HOME | End: 2021-06-07
Payer: COMMERCIAL

## 2021-06-07 ENCOUNTER — APPOINTMENT (OUTPATIENT)
Dept: GENERAL RADIOLOGY | Age: 46
End: 2021-06-07
Payer: COMMERCIAL

## 2021-06-07 VITALS
HEIGHT: 68 IN | OXYGEN SATURATION: 98 % | RESPIRATION RATE: 16 BRPM | DIASTOLIC BLOOD PRESSURE: 85 MMHG | HEART RATE: 71 BPM | SYSTOLIC BLOOD PRESSURE: 123 MMHG | WEIGHT: 260 LBS | BODY MASS INDEX: 39.4 KG/M2 | TEMPERATURE: 98.1 F

## 2021-06-07 DIAGNOSIS — R25.2 BILATERAL LEG CRAMPS: Primary | ICD-10-CM

## 2021-06-07 LAB
ANION GAP SERPL CALCULATED.3IONS-SCNC: 11 MMOL/L (ref 3–16)
BUN BLDV-MCNC: 16 MG/DL (ref 7–20)
CALCIUM SERPL-MCNC: 9.2 MG/DL (ref 8.3–10.6)
CHLORIDE BLD-SCNC: 103 MMOL/L (ref 99–110)
CO2: 23 MMOL/L (ref 21–32)
CREAT SERPL-MCNC: 1.3 MG/DL (ref 0.9–1.3)
GFR AFRICAN AMERICAN: >60
GFR NON-AFRICAN AMERICAN: 60
GLUCOSE BLD-MCNC: 97 MG/DL (ref 70–99)
POTASSIUM REFLEX MAGNESIUM: 4.1 MMOL/L (ref 3.5–5.1)
SODIUM BLD-SCNC: 137 MMOL/L (ref 136–145)

## 2021-06-07 PROCEDURE — 97112 NEUROMUSCULAR REEDUCATION: CPT

## 2021-06-07 PROCEDURE — 73552 X-RAY EXAM OF FEMUR 2/>: CPT

## 2021-06-07 PROCEDURE — 36415 COLL VENOUS BLD VENIPUNCTURE: CPT

## 2021-06-07 PROCEDURE — 80048 BASIC METABOLIC PNL TOTAL CA: CPT

## 2021-06-07 PROCEDURE — 97110 THERAPEUTIC EXERCISES: CPT

## 2021-06-07 PROCEDURE — 99283 EMERGENCY DEPT VISIT LOW MDM: CPT

## 2021-06-07 PROCEDURE — 97140 MANUAL THERAPY 1/> REGIONS: CPT

## 2021-06-07 RX ORDER — CYCLOBENZAPRINE HCL 10 MG
10 TABLET ORAL 3 TIMES DAILY PRN
Qty: 20 TABLET | Refills: 0 | Status: SHIPPED | OUTPATIENT
Start: 2021-06-07 | End: 2021-12-27

## 2021-06-07 ASSESSMENT — ENCOUNTER SYMPTOMS
CONSTIPATION: 0
WHEEZING: 0
SHORTNESS OF BREATH: 0
DIARRHEA: 0
ABDOMINAL PAIN: 0
ABDOMINAL DISTENTION: 0
COLOR CHANGE: 0
STRIDOR: 0
NAUSEA: 0
BACK PAIN: 0
VOMITING: 0
COUGH: 0

## 2021-06-07 ASSESSMENT — PAIN SCALES - GENERAL: PAINLEVEL_OUTOF10: 8

## 2021-06-07 ASSESSMENT — PAIN DESCRIPTION - PAIN TYPE: TYPE: ACUTE PAIN

## 2021-06-07 ASSESSMENT — PAIN DESCRIPTION - ORIENTATION: ORIENTATION: RIGHT;LEFT

## 2021-06-07 ASSESSMENT — PAIN DESCRIPTION - LOCATION: LOCATION: LEG

## 2021-06-07 NOTE — PROGRESS NOTES
noted along long thumb extensors on dorsal side of thumb    5/7/21  RT elbow extension  AAROM  -7   Test measurements:      6/2/2021   PROM AROM     L R L R   Cervical Flexion            Cervical Extension            Cervical Rotation           Cervical Side-bend           Shoulder Flexion      135   Shoulder Abduction      145   Shoulder External Rotation      50   Shoulder Internal Rotation      65   Elbow Flexion    143 135   Elbow Extension    0 Lack 8 from neutral *   Pronation      90*   Supination      63*   Wrist Flexion        WFL *   Wrist Extension        WFL*   Radial Deviation                RESTRICTIONS/PRECAUTIONS: NWB until 5/28    Exercises/Interventions:   Therapeutic Exercise (62493)  20' Resistance / level Sets / Seconds  Reps Notes/Cues    Wrist extension/ flexion stretching  Standing elbow extension EOB     Wrist supination    Stretching  seated     Supported Ulnar Deviation ROM  Pronated  5/17:    Elbow flexion and stretching     Gripping         5/10: Digi-Indiv digits; Claw flex(towel squeeze)             Wrist Ext  Wrist Pron/Sup  2# MB   0.5kg MB  3 10 EA  5/24     5/10: Neutral/ Pronated    Supine skull crushers  2# MB 3 10 5/17: Pain-free range    Standing bicep curl with Neutral  2# hand weight 2 10    Bicep curl with wrist rotation 2# med ball 2 10                  Therapeutic Activities (79312)       Pt education regarding stretching, desk posture, OTC analgesic options (volt.  Gel) and progression of stretching etc.                      Neuromuscular Re-ed (17772)       Supine A/A SA Punch +  2# hand weight 3\" 2x10     B shoulder ext with scap squeeze lime 2 10    S/L ER  0.5 kg MG  2 10  6/7- Cues to dec bicep comp   S/L Punch  0#  2  10  6/7- Cues for scap retraction    Tband Row  Lime  2 10  6/7           Triceps extensions  lime 2 10x 5/19  6/7-Neutral and Pron   Manual Intervention (66021) 10'       Shld /GH Mobs       Post Cap mobs       Rhythmic Stabs   5/10: Supine, elbow 90 deg on table    STM lateral thumb/wrist   Light stretching for dorsal thumb musculature (5/26 with IASTM)  5 min     PROM MT Elbow Ext   Pro/Sup  Rad/Uln Dev  Wrist Flex/Ext  5'  5/10: Supine     Modalities: 5/7,5/10, 5/17, 5/26: CP x 10' to prox radius; Seated with elbow bent on table     Pt. Education:  -pt educated on diagnosis, prognosis and expectations for rehab  -all pt questions were answered    Home Exercise Program:      Access Code: [email or web address]  URL: [email or web address]  Date: 06/07/2021  Prepared by: Parris Armenta    Exercises  Standing Tricep Extensions with Resistance - 1 x daily - 7 x weekly - 10 reps - 3 sets  Standing Single Arm Elbow Flexion with Resistance - 1 x daily - 7 x weekly - 10 reps - 3 sets  Standing Shoulder Row with Anchored Resistance - 1 x daily - 7 x weekly - 10 reps - 3 sets  Shoulder extension with resistance - Neutral - 1 x daily - 7 x weekly - 10 reps - 3 sets    Access Code: [email or web address]  URL: [email or web address]  Date: 05/05/2021  Prepared by: Slime Becker    Exercises  Seated Wrist Flexion with Overpressure - 2 x daily - 7 x weekly - 4 reps - 15 hold  Wrist Extension Stretch Pronated - 2 x daily - 7 x weekly - 4 reps - 15 hold  Supported Elbow Flexion Extension PROM - 2 x daily - 7 x weekly - 4 reps - 15 hold  Standing Wrist Extension Stretch - 2 x daily - 7 x weekly - 4 reps - 15 hold  Seated Wrist Supination Stretch - 2 x daily - 7 x weekly - 4 reps - 15 hold      Therapeutic Exercise and NMR EXR  [x] (73016) Provided verbal/tactile cueing for activities related to strengthening, flexibility, endurance, ROM  for improvements in scapular, scapulothoracic and UE control with self care, reaching, carrying, lifting, house/yardwork, driving/computer work.    [] (25153) Provided verbal/tactile cueing for activities related to improving balance, coordination, kinesthetic sense, posture, motor skill, proprioception  to assist with  scapular, scapulothoracic and UE control with self care, reaching, carrying, lifting, house/yardwork, driving/computer work.  [] (66471) Therapist is in constant attendance of 2 or more patients providing skilled therapy interventions, but not providing any significant amount of measurable one-on-one time to either patient, for improvements in cervical, scapular, scapulothoracic and UE control with self care, reaching, carrying, lifting, house/yardwork, driving, computer work. Therapeutic Activities:    [] (79780 or 51400) Provided verbal/tactile cueing for activities related to improving balance, coordination, kinesthetic sense, posture, motor skill, proprioception and motor activation to allow for proper function of scapular, scapulothoracic and UE control with self care, carrying, lifting, driving/computer work.      Home Exercise Program:    [x] (77986) Reviewed/Progressed HEP activities related to strengthening, flexibility, endurance, ROM of scapular, scapulothoracic and UE control with self care, reaching, carrying, lifting, house/yardwork, driving/computer work  [] (61297) Reviewed/Progressed HEP activities related to improving balance, coordination, kinesthetic sense, posture, motor skill, proprioception of scapular, scapulothoracic and UE control with self care, reaching, carrying, lifting, house/yardwork, driving/computer work      Manual Treatments:  PROM / STM / Oscillations-Mobs:  G-I, II, III, IV (PA's, Inf., Post.)  [x] (25721) Provided manual therapy to mobilize soft tissue/joints of cervical/CT, scapular GHJ and UE for the purpose of modulating pain, promoting relaxation,  increasing ROM, reducing/eliminating soft tissue swelling/inflammation/restriction, improving soft tissue extensibility and allowing for proper ROM for normal function with self care, reaching, carrying, lifting, house/yardwork, driving/computer work      Charges:  Timed Code Treatment Minutes: 45   Total Treatment Minutes: 45       [] EVAL - LOW (70218)   [] towards functional goals listed. [] Progression is slowed due to complexities/Impairments listed. [] Progression has been slowed due to co-morbidities. [] Plan just implemented, too soon to assess goals progression <30days   [] Goals require adjustment due to lack of progress  [] Patient is not progressing as expected and requires additional follow up with physician  [] Other    Persisting Functional Limitations/Impairments:  []Sitting []Standing   [x]Transfers  []Sleeping   [x]Reaching [x]Lifting   [x]ADLs [x]Housework  [x]Driving [x]Job related tasks  []Sports/Recreation []Other:    ASSESSMENT: Initiated S/L NMR this date for RTC and periscapular musculature to improve GH and UE stability and function. Pt required verbal and tactile cueing to improve biomechanics and decrease stress to lateral epicondyle during S/L strengthening this date. HEP updated to include Tband resistance strengthening for posterior shoulder and UE as pt demo's good tolerance to strengthening during session this date. Progressing well with overall ROM and strength, will benefit from continuation of PT to maximize ROM and strength activity for LTG achievement. Treatment/Activity Tolerance:  [x] Pt able to complete treatment [] Patient limited by fatique  [] Patient limited by pain  [] Patient limited by other medical complications  [] Other:     Prognosis: - [x] Good [] Fair  [] Poor    Patient Requires Follow-up: [x] Yes  [] No    Return to Play:    [x]  N/A     []  Stage 1: Intro to Strength   []  Stage 2: Dynamic Strength and Intro to Plyometrics   []  Stage 3: Advanced Plyometrics and Intro to Throwing   []  Stage 4: Sport specific Training/Return to Sport     []  Ready to Return to Play, [employer] [facility] [employer]   []  Not Ready for Return to Sports   Comments:      PLAN: Review HEP NPV to assess tolerance and compliance. PT 2x / week for 6 weeks.   [x] Continue per plan of care [] Alter current plan (see comments)  [] Plan of care initiated [] Hold pending MD visit [] Discharge    Electronically signed by: Natty Green, PTA [id number]    Note: If patient does not return for scheduled/ recommended follow up visits, this note will serve as a discharge from care along with most recent update on progress.
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
## 2021-06-10 ENCOUNTER — HOSPITAL ENCOUNTER (OUTPATIENT)
Dept: PHYSICAL THERAPY | Age: 46
Setting detail: THERAPIES SERIES
Discharge: HOME | End: 2021-06-10
Payer: COMMERCIAL

## 2021-06-14 ENCOUNTER — HOSPITAL ENCOUNTER (OUTPATIENT)
Dept: PHYSICAL THERAPY | Age: 46
Setting detail: THERAPIES SERIES
Discharge: HOME | End: 2021-06-14
Payer: COMMERCIAL

## 2021-06-14 PROCEDURE — 97112 NEUROMUSCULAR REEDUCATION: CPT

## 2021-06-14 PROCEDURE — 97110 THERAPEUTIC EXERCISES: CPT

## 2021-06-14 PROCEDURE — 97530 THERAPEUTIC ACTIVITIES: CPT

## 2021-06-14 NOTE — PLAN OF CARE
[address] [facility]  Phone: [phone]   Fax: [phone]  Physical Therapy Re-Certification Plan of Care    Dear Preethi Vieyra MD,    We had the pleasure of treating the following patient for physical therapy services at [facility]. A summary of our findings can be found in the updated assessment below. This includes our plan of care. If you have any questions or concerns regarding these findings, please do not hesitate to contact me at the office phone number checked above. Thank you for the referral.     Physician Signature:________________________________Date:__________________  By signing above (or electronic signature), therapist's plan is approved by physician      Functional Outcome:   Quick DASH: raw score= 24; dysfunction = 30%         PROM AROM     L R L R   Cervical Flexion            Cervical Extension            Cervical Rotation           Cervical Side-bend           Shoulder Flexion      135   Shoulder Abduction      145   Shoulder External Rotation      50   Shoulder Internal Rotation      65   Elbow Flexion    143 137   Elbow Extension    0 Lack 3 from neutral *   Pronation      90   Supination      73*   Wrist Flexion        WFL *   Wrist Extension        WFL*   Radial Deviation              *Pain at end range of motions    Strength (0-5) Left Right    Shoulder Shrug (C4)       Shoulder Flex       Shoulder Abd (C5)       Shoulder ER       Shoulder IR       Biceps (C6)    4   Triceps (C7)    4-   Lats       Middle Trap       Rhomboids       Lower Trap        Pronation     4   Supination     4-   Wrist Flex (C7)    4   Wrist Ext (C6)    4   Wrist Radial Deviation       Wrist Ulnar Deviation          93# avg 3 trials 73# avg 3 trials              ASSESSMENT:  Pt continues to progress well with PT POC, really good progress with R wrist, hand and elbow ROM and strength.  Pt continues to have slight reports of wrist and elbow pain at end range of ranges noted however symptoms were mild and do not continue after getting out of those ranges. Strength assessed today and progressing well over the past several weeks however still limited and not symmetrical with contralateral UE. Pt continues to have issues with repetitive tasks and tasks requiring higher levels of strength demands due to weakness and pain. Pt will benefit from continuation of PT with focus on progressing strength and activity tolerance for carry over with functional activity. Overall Response to Treatment:   [x]Patient is responding well to treatment and improvement is noted with regards  to goals   []Patient should continue to improve in reasonable time if they continue HEP   []Patient has plateaued and is no longer responding to skilled PT intervention    []Patient is getting worse and would benefit from return to referring MD   []Patient unable to adhere to initial POC   []Other: -    Date range of Visits:  -   Total Visits: 11    Recommendation:    [x]Continue PT 1-2x / wk for 4 weeks. []Hold PT, pending MD visit        Physical Therapy Treatment Note/ Progress Report:     Date:  2021    Patient Name:  Jones Zavaleta    :  1975  MRN: [id number]  Restrictions/Precautions:    Medical/Treatment Diagnosis Information:  Diagnosis: S52.124D (ICD-10-CM) - Closed nondisplaced fracture of head of right radius with routine healing, subsequent encounter  Treatment Diagnosis: Decreased R elbow, wrist and shoulder ROM, flexiblity, muscle strength and activation with limitations with ADL's, IADL's, recreational and work activity.   Insurance/Certification information:  PT Insurance Information: [place] (24 visits prior to [facility])  Physician Information:  Referring Practitioner: Cristal Raygoza MD  Plan of care signed (Y/N): [x]  Yes []  No     Date of Patient follow up with Physician:      Progress Report: [x] Yes  []  No     Date Range for reporting period:  Beginnin/5  Ending: -    Progress report due (10 Rx/or 30 days whichever is less): visit #10 or  (date)     Recertification due (POC duration/ or 90 days whichever is less): visit #12 or  (date)     Visit # Insurance Allowable Auth required? Date Range   11 24 (soft limit) [x]  Yes - After visit 24  []  No -     Latex Allergy:  [x]NO      []YES  Preferred Language for Healthcare:   [x]English       []other:    Functional Scale:       Date assessed:  Quick DASH: raw score = 43; dysfunction = 72%     Quick DASH: raw score= 24; dysfunction = 30%        Pain level:  2-3/10 rest    SUBJECTIVE:   Pt reports his elbow continues to get better, still has some light pain on a regular consistent basis that does get worse with activity. Still feels weak and gets tired quickly. Over the weekend he did try to carry a light grocery bag and was ok lifting it but fatigued very quickly with walking for a few minutes. Pt does report he continues to have issues with full work activity as using computer on repetitive basis does cause increase in pain in his elbow as well as wrist and thumb and needs a few minute break to continue with work activity.        OBJECTIVE:    Observation:    : +DeQuirevein's sign, tenderness noted along long thumb extensors on dorsal side of thumb    21  RT elbow extension  AAROM  -7   Test measurements:      2021   PROM AROM     L R L R   Cervical Flexion            Cervical Extension            Cervical Rotation           Cervical Side-bend           Shoulder Flexion      135   Shoulder Abduction      145   Shoulder External Rotation      50   Shoulder Internal Rotation      65   Elbow Flexion    143 135   Elbow Extension    0 Lack 8 from neutral *   Pronation      90*   Supination      63*   Wrist Flexion        WFL *   Wrist Extension        WFL*   Radial Deviation                RESTRICTIONS/PRECAUTIONS: NWB until 5/28    Exercises/Interventions:   Therapeutic Exercise (36279)  20' Resistance / level Sets / Seconds  Reps Notes/Cues    Wrist extension/ flexion stretching  Standing elbow extension EOB     Wrist supination    Stretching  seated     Supported Ulnar Deviation ROM  Pronated  5/17:    Elbow flexion and stretching     Gripping         5/10: [employer] digits; Claw flex(towel squeeze)             Wrist Ext  Wrist Pron/Sup  2# MB   0.5kg MB  3 10 EA  5/24     5/10: Neutral/ Pronated    Supine skull crushers  2# MB 3 10 5/17: Pain-free range    Standing bicep curl with Neutral  2# hand weight 2 10    Bicep curl with wrist rotation 2# med ball 2 10                  Therapeutic Activities (57522)       Pt education regarding stretching, desk posture, OTC analgesic options (volt. Gel) and progression of stretching etc.                      Neuromuscular Re-ed (48713)       Supine A/A SA Punch +  2# hand weight 3\" 2x10     B shoulder ext with scap squeeze lime 2 10    S/L ER  0.5 kg MG  2 10  6/7- Cues to dec bicep comp   S/L Punch  0#  2  10  6/7- Cues for scap retraction    Tband Row  Lime  3 10  6/7           Triceps extensions  lime 3 10x 5/19  6/7-Neutral and Pron   Manual Intervention (49727) 10'       Shld /GH Mobs       Post Cap mobs       Rhythmic Stabs   5/10: Supine, elbow 90 deg on table        PROM MT Elbow Ext   Pro/Sup  Rad/Uln Dev  Wrist Flex/Ext  5'  5/10: Supine     Modalities: 5/7,5/10, 5/17, 5/26: CP x 10' to prox radius; Seated with elbow bent on table     Pt. Education:  -pt educated on diagnosis, prognosis and expectations for rehab  -all pt questions were answered    Home Exercise Program:      Access Code: [email or web address]  URL: [email or web address]  Date: 06/07/2021  Prepared by: Neda Friedman    Exercises  Standing Tricep Extensions with Resistance - 1 x daily - 7 x weekly - 10 reps - 3 sets  Standing Single Arm Elbow Flexion with Resistance - 1 x daily - 7 x weekly - 10 reps - 3 sets  Standing care, carrying, lifting, driving/computer work. Home Exercise Program:    [x] (09804) Reviewed/Progressed HEP activities related to strengthening, flexibility, endurance, ROM of scapular, scapulothoracic and UE control with self care, reaching, carrying, lifting, house/yardwork, driving/computer work  [] (02426) Reviewed/Progressed HEP activities related to improving balance, coordination, kinesthetic sense, posture, motor skill, proprioception of scapular, scapulothoracic and UE control with self care, reaching, carrying, lifting, house/yardwork, driving/computer work      Manual Treatments:  PROM / STM / Oscillations-Mobs:  G-I, II, III, IV (PA's, Inf., Post.)  [x] (68875) Provided manual therapy to mobilize soft tissue/joints of cervical/CT, scapular GHJ and UE for the purpose of modulating pain, promoting relaxation,  increasing ROM, reducing/eliminating soft tissue swelling/inflammation/restriction, improving soft tissue extensibility and allowing for proper ROM for normal function with self care, reaching, carrying, lifting, house/yardwork, driving/computer work      Charges:  Timed Code Treatment Minutes: 45   Total Treatment Minutes: 45       [] EVAL - LOW (39266)   [] EVAL - MOD (28117)  [] EVAL - HIGH (20571)  [] RE-EVAL (75482)  [x] WP(41164) x 1     [] Ionto  [x] NMR (61956) x 1    [] Vaso  [x] Manual (77573) x 1    [] Ultrasound:  [] TA x 1      [] [facility] T[facility] (98387)  [] Home Management Training x    [] ES (un) (52778):   [] Aquatic    [] ES(attended) (77006)  [] Other:                     GOALS:  Patient stated goal: get back to prior activity and UE usage without issues  []? Progressing: []? Met: []? Not Met: []? Adjusted     Therapist goals for Patient:   Short Term Goals: To be achieved in: 2 weeks  1. Independent in HEP and progression per patient tolerance, in order to prevent re-injury. []? Progressing: []? Met: []? Not Met: []? Adjusted  2.  Patient will have a decrease in pain to facilitate improvement in movement, function, and ADLs as indicated by Functional Deficits. []? Progressing: []? Met: []? Not Met: []? Adjusted     Long Term Goals: To be achieved in: 6 weeks  1. Disability index score of 10% or less for the UEFI or Quick DASH to assist with reaching prior level of function. 6/2: 30%  [x]? Progressing: []? Met: []? Not Met: []? Adjusted  2. Patient will demonstrate increased AROM to 0-140 elbow ROM to allow for proper joint functioning as indicated by patients Functional Deficits. 6/14: Lacking 3-137  [x]? Progressing: []? Met: []? Not Met: []? Adjusted  3. Patient will demonstrate an increase in Strength to 4+ to allow for proper functional mobility as indicated by patients Functional Deficits. 6/14: Avg 4/10 grossly  [x]? Progressing: []? Met: []? Not Met: []? Adjusted  4. Patient will return to functional activities including full work activity without increased symptoms or restriction. 6/14: Still needs breaks due to pain/fatigue   [x]? Progressing: []? Met: []? Not Met: []? Adjusted  5. Patient will get back to driving without issues or limitations from R UE.   6/14: Pain with repetitive gripping  [x]? Progressing: []? Met: []? Not Met: []? Adjusted         Overall Progression Towards Functional goals/ Treatment Progress Update:  [x] Patient is progressing as expected towards functional goals listed. [] Progression is slowed due to complexities/Impairments listed. [] Progression has been slowed due to co-morbidities.   [] Plan just implemented, too soon to assess goals progression <30days   [] Goals require adjustment due to lack of progress  [] Patient is not progressing as expected and requires additional follow up with physician  [] Other    Persisting Functional Limitations/Impairments:  []Sitting []Standing   [x]Transfers  []Sleeping   [x]Reaching [x]Lifting   [x]ADLs [x]Housework  [x]Driving [x]Job related tasks  []Sports/Recreation []Other:    ASSESSMENT:  Pt continues to progress well with PT POC, really good progress with R wrist, hand and elbow ROM and strength. Pt continues to have slight reports of wrist and elbow pain at end range of ranges noted however symptoms were mild and do not continue after getting out of those ranges. Strength assessed today and progressing well over the past several weeks however still limited and not symmetrical with contralateral UE. Pt continues to have issues with repetitive tasks and tasks requiring higher levels of strength demands due to weakness and pain. Pt will benefit from continuation of PT with focus on progressing strength and activity tolerance for carry over with functional activity. Treatment/Activity Tolerance:  [x] Pt able to complete treatment [] Patient limited by fatique  [] Patient limited by pain  [] Patient limited by other medical complications  [] Other:     Prognosis: - [x] Good [] Fair  [] Poor    Patient Requires Follow-up: [x] Yes  [] No    Return to Play:    [x]  N/A     []  Stage 1: Intro to Strength   []  Stage 2: Dynamic Strength and Intro to Plyometrics   []  Stage 3: Advanced Plyometrics and Intro to Throwing   []  Stage 4: Sport specific Training/Return to Sport     []  Ready to Return to Play, [employer]   []  Not Ready for Return to Sports   Comments:      PLAN: Review HEP NPV to assess tolerance and compliance. PT 2x / week for 6 weeks. [x] Continue per plan of care [] Alter current plan (see comments)  [] Plan of care initiated [] Hold pending MD visit [] Discharge    Electronically signed by: Hang Escobar, PT DPT, OMT-C    Note: If patient does not return for scheduled/ recommended follow up visits, this note will serve as a discharge from care along with most recent update on progress.

## 2021-06-17 ENCOUNTER — HOSPITAL ENCOUNTER (OUTPATIENT)
Dept: PHYSICAL THERAPY | Age: 46
Setting detail: THERAPIES SERIES
Discharge: HOME | End: 2021-06-17
Payer: COMMERCIAL

## 2021-06-17 PROCEDURE — 97110 THERAPEUTIC EXERCISES: CPT

## 2021-06-17 PROCEDURE — 97112 NEUROMUSCULAR REEDUCATION: CPT

## 2021-06-17 PROCEDURE — 97530 THERAPEUTIC ACTIVITIES: CPT

## 2021-06-17 NOTE — FLOWSHEET NOTE
[address] [facility]  Phone: [phone]   Fax: [phone]    Physical Therapy Treatment Note/ Progress Report:     Date:  2021    Patient Name:  Marny Collet    :  1975  MRN: [id number]  Restrictions/Precautions:    Medical/Treatment Diagnosis Information:  Diagnosis: S52.124D (ICD-10-CM) - Closed nondisplaced fracture of head of right radius with routine healing, subsequent encounter  Treatment Diagnosis: Decreased R elbow, wrist and shoulder ROM, flexiblity, muscle strength and activation with limitations with ADL's, IADL's, recreational and work activity. Insurance/Certification information:  PT Insurance Information: [place] (24 visits prior to Amalia Lema)  Physician Information:  Referring Practitioner: Niki Wick MD  Plan of care signed (Y/N): [x]  Yes []  No     Date of Patient follow up with Physician:      Progress Report: [x]  Yes  []  No     Date Range for reporting period:  Beginnin/5  Ending: -    Progress report due (10 Rx/or 30 days whichever is less): visit #10 or  (date)     Recertification due (POC duration/ or 90 days whichever is less): visit #12 or  (date)     Visit # Insurance Allowable Auth required? Date Range   11  (soft limit) [x]  Yes - After visit 24  []  No -     Latex Allergy:  [x]NO      []YES  Preferred Language for Healthcare:   [x]English       []other:    Functional Scale:       Date assessed:  Quick DASH: raw score = 43; dysfunction = 72%     Quick DASH: raw score= 24; dysfunction = 30%        Pain level:  2-3/10 rest    SUBJECTIVE:    Pt reports he continues to get better with his elbow, has been really sore with his hand since last visit, just around thumb area.        OBJECTIVE:    Observation:    : +DeQuirevein's sign, tenderness noted along long thumb extensors on dorsal side of thumb    21  RT elbow extension  AAROM  -7   Test measurements:      2021   PROM AROM     L R L R Cervical Flexion            Cervical Extension            Cervical Rotation           Cervical Side-bend           Shoulder Flexion      135   Shoulder Abduction      145   Shoulder External Rotation      50   Shoulder Internal Rotation      65   Elbow Flexion    143 135   Elbow Extension    0 Lack 8 from neutral *   Pronation      90*   Supination      63*   Wrist Flexion        WFL *   Wrist Extension        WFL*   Radial Deviation                RESTRICTIONS/PRECAUTIONS: NWB until 5/28    Exercises/Interventions:   Therapeutic Exercise (21244)  20' Resistance / level Sets / Seconds  Reps Notes/Cues    Wrist extension/ flexion stretching  Standing elbow extension EOB     Wrist supination    Stretching  seated     Supported Ulnar Deviation ROM  Pronated  5/17:    Elbow flexion and stretching     Gripping         5/10: Digi-Indiv digits; Claw flex(towel squeeze)             Wrist Ext  Wrist Pron/Sup    3 10 EA  5/24     5/10: Neutral/ Pronated    Supine skull crushers  12# dumbbell  2 10 5/17: Pain-free range    Standing bicep curl with Neutral  2# hand weight 2 10    Bicep curl with wrist rotation 4# med ball    2# med ball 1    2 10    10    Wrist pronation/supination with bar/weight 2 weight distally 2 10    CKC wrist ext stretch:   -wrist in pronation  -wrist in supination  20\" holds 2 each                                       Therapeutic Activities (79031)       Pt education regarding stretching, desk posture, OTC analgesic options (volt.  Gel) and progression of stretching etc.        Walking holding weight various grips4# med ball  10# KB  12# dumbbell  15# KB10' walk X 1 each Table pushups  1 10 Alternating shoulder taps in plank position on table  1 10                Neuromuscular Re-ed (12115)       Supine SA Punch +  4# med ball 3\" 2x15     B shoulder ext with scap squeeze lime 2 10    S/L ER  4#    2# MB 1    2 10    10  6/7- Cues to dec bicep comp    Row with cable column 25# 2 10  6/7 Triceps extensions  25# 3 10x 5/19  6/7-Neutral and Pron   Manual Intervention (14180) 10'       Shld /GH Mobs       Post Cap mobs       Rhythmic Stabs   5/10: Supine, elbow 90 deg on table        PROM MT Elbow Ext   Pro/Sup  Rad/Uln Dev  Wrist Flex/Ext  5'  5/10: Supine     Modalities: 5/7,5/10, 5/17, 5/26: CP x 10' to prox radius; Seated with elbow bent on table     Pt. Education:  -pt educated on diagnosis, prognosis and expectations for rehab  -all pt questions were answered    Home Exercise Program:      Access Code: [email or web address]  URL: [email or web address]  Date: 06/07/2021  Prepared by: [place] Brush    Exercises  Standing Tricep Extensions with Resistance - 1 x daily - 7 x weekly - 10 reps - 3 sets  Standing Single Arm Elbow Flexion with Resistance - 1 x daily - 7 x weekly - 10 reps - 3 sets  Standing Shoulder Row with Anchored Resistance - 1 x daily - 7 x weekly - 10 reps - 3 sets  Shoulder extension with resistance - Neutral - 1 x daily - 7 x weekly - 10 reps - 3 sets    Access Code: [email or web address]  URL: [email or web address]/  Date: 05/05/2021  Prepared by: [place]    Exercises  Seated Wrist Flexion with Overpressure - 2 x daily - 7 x weekly - 4 reps - 15 hold  Wrist Extension Stretch Pronated - 2 x daily - 7 x weekly - 4 reps - 15 hold  Supported Elbow Flexion Extension PROM - 2 x daily - 7 x weekly - 4 reps - 15 hold  Standing Wrist Extension Stretch - 2 x daily - 7 x weekly - 4 reps - 15 hold  Seated Wrist Supination Stretch - 2 x daily - 7 x weekly - 4 reps - 15 hold      Therapeutic Exercise and NMR EXR  [x] (19913) Provided verbal/tactile cueing for activities related to strengthening, flexibility, endurance, ROM  for improvements in scapular, scapulothoracic and UE control with self care, reaching, carrying, lifting, house/yardwork, driving/computer work.    [] (21468) Provided verbal/tactile cueing for activities related to improving balance, coordination, kinesthetic sense, posture, motor skill, proprioception  to assist with  scapular, scapulothoracic and UE control with self care, reaching, carrying, lifting, house/yardwork, driving/computer work.  [] (16816) Therapist is in constant attendance of 2 or more patients providing skilled therapy interventions, but not providing any significant amount of measurable one-on-one time to either patient, for improvements in cervical, scapular, scapulothoracic and UE control with self care, reaching, carrying, lifting, house/yardwork, driving, computer work. Therapeutic Activities:    [] (36064 or 14793) Provided verbal/tactile cueing for activities related to improving balance, coordination, kinesthetic sense, posture, motor skill, proprioception and motor activation to allow for proper function of scapular, scapulothoracic and UE control with self care, carrying, lifting, driving/computer work.      Home Exercise Program:    [x] (72994) Reviewed/Progressed HEP activities related to strengthening, flexibility, endurance, ROM of scapular, scapulothoracic and UE control with self care, reaching, carrying, lifting, house/yardwork, driving/computer work  [] (07119) Reviewed/Progressed HEP activities related to improving balance, coordination, kinesthetic sense, posture, motor skill, proprioception of scapular, scapulothoracic and UE control with self care, reaching, carrying, lifting, house/yardwork, driving/computer work      Manual Treatments:  PROM / STM / Oscillations-Mobs:  G-I, II, III, IV (PA's, Inf., Post.)  [x] (98382) Provided manual therapy to mobilize soft tissue/joints of cervical/CT, scapular GHJ and UE for the purpose of modulating pain, promoting relaxation,  increasing ROM, reducing/eliminating soft tissue swelling/inflammation/restriction, improving soft tissue extensibility and allowing for proper ROM for normal function with self care, reaching, carrying, lifting, house/yardwork, driving/computer work      Charges:  Timed Code Treatment Minutes: 45   Total Treatment Minutes: 45       [] EVAL - LOW (20114)   [] EVAL - MOD (48558)  [] EVAL - HIGH (74502)  [] RE-EVAL (18641)  [x] GM(64236) x 1     [] Ionto  [x] NMR (57159) x 1    [] Vaso  [] Manual (17807) x      [] Ultrasound:  [x] TA x 1      [] Mech Traction (11020)  [] Home Management Training x    [] ES (un) (93154):   [] Aquatic    [] ES(attended) (46386)  [] Other:                     GOALS:  Patient stated goal: get back to prior activity and UE usage without issues  []? Progressing: []? Met: []? Not Met: []? Adjusted     Therapist goals for Patient:   Short Term Goals: To be achieved in: 2 weeks  1. Independent in HEP and progression per patient tolerance, in order to prevent re-injury. []? Progressing: []? Met: []? Not Met: []? Adjusted  2. Patient will have a decrease in pain to facilitate improvement in movement, function, and ADLs as indicated by Functional Deficits. []? Progressing: []? Met: []? Not Met: []? Adjusted     Long Term Goals: To be achieved in: 6 weeks  1. Disability index score of 10% or less for the UEFI or Quick DASH to assist with reaching prior level of function. 6/2: 30%  [x]? Progressing: []? Met: []? Not Met: []? Adjusted  2. Patient will demonstrate increased AROM to 0-140 elbow ROM to allow for proper joint functioning as indicated by patients Functional Deficits. 6/14: Lacking 3-137  [x]? Progressing: []? Met: []? Not Met: []? Adjusted  3. Patient will demonstrate an increase in Strength to 4+ to allow for proper functional mobility as indicated by patients Functional Deficits. 6/14: Avg 4/10 grossly  [x]? Progressing: []? Met: []? Not Met: []? Adjusted  4. Patient will return to functional activities including full work activity without increased symptoms or restriction. 6/14: Still needs breaks due to pain/fatigue   [x]? Progressing: []? Met: []? Not Met: []? Adjusted  5. Patient will get back to driving without issues or limitations from R UE. 6/14: Pain with repetitive gripping  [x]? Progressing: []? Met: []? Not Met: []? Adjusted         Overall Progression Towards Functional goals/ Treatment Progress Update:  [x] Patient is progressing as expected towards functional goals listed. [] Progression is slowed due to complexities/Impairments listed. [] Progression has been slowed due to co-morbidities. [] Plan just implemented, too soon to assess goals progression <30days   [] Goals require adjustment due to lack of progress  [] Patient is not progressing as expected and requires additional follow up with physician  [] Other    Persisting Functional Limitations/Impairments:  []Sitting []Standing   [x]Transfers  []Sleeping   [x]Reaching [x]Lifting   [x]ADLs [x]Housework  [x]Driving [x]Job related tasks  []Sports/Recreation []Other:    ASSESSMENT:  Progressions today as noted per log with increased CKC and dynamic functional strength activity with various weights and gripping strategies with most complaints of pain/discomfort around thenar eminence with increase in fatigue. Pt overall with minimal issues around elbow with increase in light pain by end of session with increase in fatigue alleviated with brief rest. Overall continue to progress well with PT POC with functional activity tolerance and strength, will benefit form continuation of PT to maximize UE strength, control and activity tolerance for LTG achievement.          Treatment/Activity Tolerance:  [x] Pt able to complete treatment [] Patient limited by fatique  [] Patient limited by pain  [] Patient limited by other medical complications  [] Other:     Prognosis: - [x] Good [] Fair  [] Poor    Patient Requires Follow-up: [x] Yes  [] No    Return to Play:    [x]  N/A     []  Stage 1: Intro to Strength   []  Stage 2: Dynamic Strength and Intro to Plyometrics   []  Stage 3: Advanced Plyometrics and Intro to Throwing   []  Stage 4: Sport specific Training/Return to Sport     []  Ready to Return to Play, Meets All Above Stages   []  Not Ready for Return to Sports   Comments:      PLAN: PT 2x / week for 6 weeks. [x] Continue per plan of care [] Alter current plan (see comments)  [] Plan of care initiated [] Hold pending MD visit [] Discharge    Electronically signed by: Hang Escobar PT DPT, OMT-C    Note: If patient does not return for scheduled/ recommended follow up visits, this note will serve as a discharge from care along with most recent update on progress.

## 2021-06-21 ENCOUNTER — HOSPITAL ENCOUNTER (OUTPATIENT)
Dept: PHYSICAL THERAPY | Age: 46
Setting detail: THERAPIES SERIES
Discharge: HOME | End: 2021-06-21
Payer: COMMERCIAL

## 2021-06-21 PROCEDURE — 97110 THERAPEUTIC EXERCISES: CPT

## 2021-06-21 PROCEDURE — 97112 NEUROMUSCULAR REEDUCATION: CPT

## 2021-06-21 PROCEDURE — 97530 THERAPEUTIC ACTIVITIES: CPT

## 2021-06-21 NOTE — FLOWSHEET NOTE
[address] [facility]  Phone: [phone]   Fax: [phone]    Physical Therapy Treatment Note/ Progress Report:     Date:  2021    Patient Name:  Lisa Caro    :  1975  MRN: [id number]  Restrictions/Precautions:    Medical/Treatment Diagnosis Information:  Diagnosis: S52.124D (ICD-10-CM) - Closed nondisplaced fracture of head of right radius with routine healing, subsequent encounter  Treatment Diagnosis: Decreased R elbow, wrist and shoulder ROM, flexiblity, muscle strength and activation with limitations with ADL's, IADL's, recreational and work activity. Insurance/Certification information:  PT Insurance Information: [place] (24 visits prior to [facility])  Physician Information:  Referring Practitioner: Preethi Vieyra MD  Plan of care signed (Y/N): [x]  Yes []  No     Date of Patient follow up with Physician:      Progress Report: [x]  Yes  []  No     Date Range for reporting period:  Beginnin/5  Ending: -    Progress report due (10 Rx/or 30 days whichever is less): visit #10 or  (date)     Recertification due (POC duration/ or 90 days whichever is less): visit #12 or  (date)     Visit # Insurance Allowable Auth required? Date Range   13 24 (soft limit) [x]  Yes - After visit 24  []  No -     Latex Allergy:  [x]NO      []YES  Preferred Language for Healthcare:   [x]English       []other:    Functional Scale:       Date assessed:  Quick DASH: raw score = 43; dysfunction = 72%     Quick DASH: raw score= 24; dysfunction = 30%        Pain level:  2/10     SUBJECTIVE:    Pt reports slight increase in tenderness and ache in thenar eminence and base of R thumb after previous visit that resolved by end of day. Pt reports improved function over the weekend, however fatigue in R UE still experienced at end of day.         OBJECTIVE:    Observation:    : +DeQuirevein's sign, tenderness noted along long thumb extensors on dorsal side of thumb    5/7/21  RT elbow extension  AAROM  -7   Test measurements:      6/2/2021   PROM AROM     L R L R   Cervical Flexion            Cervical Extension            Cervical Rotation           Cervical Side-bend           Shoulder Flexion      135   Shoulder Abduction      145   Shoulder External Rotation      50   Shoulder Internal Rotation      65   Elbow Flexion    143 135   Elbow Extension    0 Lack 8 from neutral *   Pronation      90*   Supination      63*   Wrist Flexion        WFL *   Wrist Extension        WFL*   Radial Deviation                RESTRICTIONS/PRECAUTIONS: NWB until 5/28    Exercises/Interventions:   Therapeutic Exercise (83696)  20' Resistance / level Sets / Seconds  Reps Notes/Cues    Wrist extension/ flexion stretching  Standing elbow extension EOB     Wrist supination    Stretching  seated     Supported Ulnar Deviation ROM  Pronated  5/17:    Elbow flexion and stretching     Gripping         5/10: Digi-Indiv digits; Claw flex(towel squeeze)             Wrist Ext  Wrist Pron/Sup    3 10 EA  5/24     5/10: Neutral/ Pronated    Supine skull crushers  12# dumbbell  2 10 5/17: Pain-free range    Standing bicep curl with Neutral  2# hand weight 3 10    Bicep curl with wrist rotation 4# med ball    2# med ball 1    2 10    10    Wrist pronation/supination with bar/weight 2 weight distally 2 10    CKC wrist ext stretch:   -wrist in pronation  -wrist in supination  20\" holds 2 each                                       Therapeutic Activities (66430)       Pt education regarding stretching, desk posture, OTC analgesic options (volt.  Gel) and progression of stretching etc.        Walking holding weight various   10# KB  12# dumbbell  15# KB10' walk X 1 each 6/21-Most difficulty with 10# neutral kettle hold(not the handle) Table pushups  1 10 Alternating shoulder taps in plank position on table  1 10 R/L                Neuromuscular Re-ed (26513)       Supine SA Punch +  4# med ball 3\" 2x15 scapulothoracic and UE control with self care, reaching, carrying, lifting, house/yardwork, driving/computer work.    [] (77672) Provided verbal/tactile cueing for activities related to improving balance, coordination, kinesthetic sense, posture, motor skill, proprioception  to assist with  scapular, scapulothoracic and UE control with self care, reaching, carrying, lifting, house/yardwork, driving/computer work.  [] (68990) Therapist is in constant attendance of 2 or more patients providing skilled therapy interventions, but not providing any significant amount of measurable one-on-one time to either patient, for improvements in cervical, scapular, scapulothoracic and UE control with self care, reaching, carrying, lifting, house/yardwork, driving, computer work. Therapeutic Activities:    [] (25952 or 88082) Provided verbal/tactile cueing for activities related to improving balance, coordination, kinesthetic sense, posture, motor skill, proprioception and motor activation to allow for proper function of scapular, scapulothoracic and UE control with self care, carrying, lifting, driving/computer work.      Home Exercise Program:    [x] (73477) Reviewed/Progressed HEP activities related to strengthening, flexibility, endurance, ROM of scapular, scapulothoracic and UE control with self care, reaching, carrying, lifting, house/yardwork, driving/computer work  [] (40451) Reviewed/Progressed HEP activities related to improving balance, coordination, kinesthetic sense, posture, motor skill, proprioception of scapular, scapulothoracic and UE control with self care, reaching, carrying, lifting, house/yardwork, driving/computer work      Manual Treatments:  PROM / STM / Oscillations-Mobs:  G-I, II, III, IV (PA's, Inf., Post.)  [x] (66712) Provided manual therapy to mobilize soft tissue/joints of cervical/CT, scapular GHJ and UE for the purpose of modulating pain, promoting relaxation,  increasing ROM, reducing/eliminating work activity without increased symptoms or restriction. 6/14: Still needs breaks due to pain/fatigue   [x]? Progressing: []? Met: []? Not Met: []? Adjusted  5. Patient will get back to driving without issues or limitations from R UE.   6/14: Pain with repetitive gripping  [x]? Progressing: []? Met: []? Not Met: []? Adjusted         Overall Progression Towards Functional goals/ Treatment Progress Update:  [x] Patient is progressing as expected towards functional goals listed. [] Progression is slowed due to complexities/Impairments listed. [] Progression has been slowed due to co-morbidities. [] Plan just implemented, too soon to assess goals progression <30days   [] Goals require adjustment due to lack of progress  [] Patient is not progressing as expected and requires additional follow up with physician  [] Other    Persisting Functional Limitations/Impairments:  []Sitting []Standing   [x]Transfers  []Sleeping   [x]Reaching [x]Lifting   [x]ADLs [x]Housework  [x]Driving [x]Job related tasks  []Sports/Recreation []Other:    ASSESSMENT:  Good tolerance to increased resistance and volume well this date, however, pt did express slight increase in thenar eminence and lateral elbow ache and fatigue with KB bell carry 2/2 increased demand on  strength. Pt progressing well with improved endurance as pt required decreased rest breaks throughout strengthening when compared to previous visits. Overall continue to progress well with PT POC with functional activity tolerance and strength, will benefit form continuation of PT to maximize UE strength, control and activity tolerance for LTG achievement.          Treatment/Activity Tolerance:  [x] Pt able to complete treatment [] Patient limited by fatique  [] Patient limited by pain  [] Patient limited by other medical complications  [] Other:     Prognosis: - [x] Good [] Fair  [] Poor    Patient Requires Follow-up: [x] Yes  [] No    Return to Play:    [x]  N/A     [] Stage 1: Intro to Strength   []  Stage 2: Dynamic Strength and Intro to Plyometrics   []  Stage 3: Advanced Plyometrics and Intro to Throwing   []  Stage 4: Sport specific Training/Return to Sport     []  Ready to Return to Play, [employer] All Above Stages   []  Not Ready for Return to Sports   Comments:      PLAN: PT 2x / week for 6 weeks. [x] Continue per plan of care [] Alter current plan (see comments)  [] Plan of care initiated [] Hold pending MD visit [] Discharge    Electronically signed by: Petey Poe, PTA [id number]    Note: If patient does not return for scheduled/ recommended follow up visits, this note will serve as a discharge from care along with most recent update on progress.

## 2021-06-24 ENCOUNTER — HOSPITAL ENCOUNTER (OUTPATIENT)
Dept: PHYSICAL THERAPY | Age: 46
Setting detail: THERAPIES SERIES
Discharge: HOME | End: 2021-06-24
Payer: COMMERCIAL

## 2021-06-24 PROCEDURE — 97530 THERAPEUTIC ACTIVITIES: CPT

## 2021-06-24 PROCEDURE — 97112 NEUROMUSCULAR REEDUCATION: CPT

## 2021-06-24 PROCEDURE — 97110 THERAPEUTIC EXERCISES: CPT

## 2021-06-24 NOTE — FLOWSHEET NOTE
[address] [facility] Physical Therapy  Phone: [phone]   Fax: [phone]    Physical Therapy Treatment Note/ Progress Report:     Date:  2021    Patient Name:  Jude Mark    :  1975  MRN: [id number]  Restrictions/Precautions:    Medical/Treatment Diagnosis Information:  Diagnosis: S52.124D (ICD-10-CM) - Closed nondisplaced fracture of head of right radius with routine healing, subsequent encounter  Treatment Diagnosis: Decreased R elbow, wrist and shoulder ROM, flexiblity, muscle strength and activation with limitations with ADL's, IADL's, recreational and work activity. Insurance/Certification information:  PT Insurance Information: [place] (24 visits prior to [facility])  Physician Information:  Referring Practitioner: Girma Villaseñor MD  Plan of care signed (Y/N): [x]  Yes []  No     Date of Patient follow up with Physician:      Progress Report: [x]  Yes  []  No     Date Range for reporting period:  Beginnin/5  Ending: -    Progress report due (10 Rx/or 30 days whichever is less): visit #10 or  (date)     Recertification due (POC duration/ or 90 days whichever is less): visit #12 or  (date)     Visit # Insurance Allowable Auth required? Date Range   14  (soft limit) [x]  Yes - After visit 24  []  No -     Latex Allergy:  [x]NO      []YES  Preferred Language for Healthcare:   [x]English       []other:    Functional Scale:       Date assessed:  Quick DASH: raw score = 43; dysfunction = 72%     Quick DASH: raw score= 24; dysfunction = 30%    /    Pain level:  0-2/10     SUBJECTIVE:  Pt reports he continues to note progress during the day with minimal pain and issues however usually by the end of the day he does have some increase in his pain. Pt reports last night it was more in his thumb area but did have some pain in his elbow.          OBJECTIVE:    Observation:    : +DeQuirevein's sign, tenderness noted along long thumb extensors on dorsal side of thumb    5/7/21  RT elbow extension  AAROM  -7   Test measurements:      6/2/2021   PROM AROM     L R L R   Cervical Flexion            Cervical Extension            Cervical Rotation           Cervical Side-bend           Shoulder Flexion      135   Shoulder Abduction      145   Shoulder External Rotation      50   Shoulder Internal Rotation      65   Elbow Flexion    143 135   Elbow Extension    0 Lack 8 from neutral *   Pronation      90*   Supination      63*   Wrist Flexion        WFL *   Wrist Extension        WFL*   Radial Deviation                RESTRICTIONS/PRECAUTIONS: NWB until 5/28    Exercises/Interventions:   Therapeutic Exercise (74003)  20' Resistance / level Sets / Seconds  Reps Notes/Cues    Wrist extension/ flexion stretching  Standing elbow extension EOB     Wrist supination    Stretching  seated     Supported Ulnar Deviation ROM  Pronated  5/17:    Elbow flexion and stretching     Gripping                   Wrist Ext  Wrist Pron/Sup    3 10 EA  5/24     5/10: Neutral/ Pronated    Supine skull crushers  15# dumbbell  2 10 5/17: Pain-free range    Standing bicep curl with Neutral  4# KB weight 3 10    Bicep curl with wrist rotation 4# med ball     2     15        Wrist pronation/supination with bar/weight 2 weight distally 2 15    CKC wrist ext stretch:   -wrist in pronation  -wrist in supination  20\" holds 2 each                                       Therapeutic Activities (36187)       Pt education regarding stretching, desk posture, OTC analgesic options (volt.  Gel) and progression of stretching etc.        Walking holding weight various   10# KB  12# dumbbell  15# KB10' walk X 1 each 6/21-Most difficulty with 10# neutral kettle hold(not the handle) Table pushups  2 10 Alternating shoulder taps in plank position on table  2 10 R/L   carry on R UE 7.5 KB 10' 2               Neuromuscular Re-ed (29165)       Supine SA Punch +  4# med ball 3\" 2x15     B shoulder ext with scap squeeze lime 2 10    S/L ER  4#    2# MB 3    2 10    10  6/7- Cues to dec bicep comp   S/L Punch  2# MB  2  10  B shoulder ext with cable column 35# 2 10     Row with cable column 35# 3 10  6/7    Triceps extensions  25# 3 10x 5/19  6/7-Neutral and Pron   Manual Intervention (29598) 5'       Shld /GH Mobs       Post Cap mobs       Rhythmic Stabs   5/10: Supine, elbow 90 deg on table        PROM MT Elbow Ext   Pro/Sup  Rad/Uln Dev  Wrist Flex/Ext  5'  5/10: Supine     Modalities: 5/7,5/10, 5/17, 5/26: CP x 10' to prox radius; Seated with elbow bent on table     Pt. Education:  -pt educated on diagnosis, prognosis and expectations for rehab  -all pt questions were answered    Home Exercise Program:      Access Code: [email or web address]  URL: [email or web address]  Date: 06/07/2021  Prepared by: Joy Brush    Exercises  Standing Tricep Extensions with Resistance - 1 x daily - 7 x weekly - 10 reps - 3 sets  Standing Single Arm Elbow Flexion with Resistance - 1 x daily - 7 x weekly - 10 reps - 3 sets  Standing Shoulder Row with Anchored Resistance - 1 x daily - 7 x weekly - 10 reps - 3 sets  Shoulder extension with resistance - Neutral - 1 x daily - 7 x weekly - 10 reps - 3 sets    Access Code: [email or web address]  URL: [email or web address]/  Date: 05/05/2021  Prepared by: Des Cuenca    Exercises  Seated Wrist Flexion with Overpressure - 2 x daily - 7 x weekly - 4 reps - 15 hold  Wrist Extension Stretch Pronated - 2 x daily - 7 x weekly - 4 reps - 15 hold  Supported Elbow Flexion Extension PROM - 2 x daily - 7 x weekly - 4 reps - 15 hold  Standing Wrist Extension Stretch - 2 x daily - 7 x weekly - 4 reps - 15 hold  Seated Wrist Supination Stretch - 2 x daily - 7 x weekly - 4 reps - 15 hold      Therapeutic Exercise and NMR EXR  [x] (88364) Provided verbal/tactile cueing for activities related to strengthening, flexibility, endurance, ROM  for improvements in scapular, scapulothoracic and UE control with self care, reaching, carrying, lifting, house/yardwork, driving/computer work.    [] (13604) Provided verbal/tactile cueing for activities related to improving balance, coordination, kinesthetic sense, posture, motor skill, proprioception  to assist with  scapular, scapulothoracic and UE control with self care, reaching, carrying, lifting, house/yardwork, driving/computer work.  [] (80562) Therapist is in constant attendance of 2 or more patients providing skilled therapy interventions, but not providing any significant amount of measurable one-on-one time to either patient, for improvements in cervical, scapular, scapulothoracic and UE control with self care, reaching, carrying, lifting, house/yardwork, driving, computer work. Therapeutic Activities:    [] (91620 or 06679) Provided verbal/tactile cueing for activities related to improving balance, coordination, kinesthetic sense, posture, motor skill, proprioception and motor activation to allow for proper function of scapular, scapulothoracic and UE control with self care, carrying, lifting, driving/computer work.      Home Exercise Program:    [x] (42006) Reviewed/Progressed HEP activities related to strengthening, flexibility, endurance, ROM of scapular, scapulothoracic and UE control with self care, reaching, carrying, lifting, house/yardwork, driving/computer work  [] (18690) Reviewed/Progressed HEP activities related to improving balance, coordination, kinesthetic sense, posture, motor skill, proprioception of scapular, scapulothoracic and UE control with self care, reaching, carrying, lifting, house/yardwork, driving/computer work      Manual Treatments:  PROM / STM / Oscillations-Mobs:  G-I, II, III, IV (PA's, Inf., Post.)  [x] (86965) Provided manual therapy to mobilize soft tissue/joints of cervical/CT, scapular GHJ and UE for the purpose of modulating pain, promoting relaxation,  increasing ROM, reducing/eliminating soft tissue swelling/inflammation/restriction, improving soft tissue extensibility and allowing for proper ROM for normal function with self care, reaching, carrying, lifting, house/yardwork, driving/computer work      Charges:  Timed Code Treatment Minutes: 45   Total Treatment Minutes: 45       [] EVAL - LOW (04647)   [] EVAL - MOD (91872)  [] EVAL - HIGH (28112)  [] RE-EVAL (75601)  [x] DX(22919) x 1     [] Ionto  [x] NMR (63203) x 1    [] Vaso  [] Manual (72702) x      [] Ultrasound:  [x] TA x 1      [] Mech Traction (96119)  [] Home Management Training x    [] ES (un) (62398):   [] Aquatic    [] ES(attended) (55095)  [] Other:                     GOALS:  Patient stated goal: get back to prior activity and UE usage without issues  []? Progressing: []? Met: []? Not Met: []? Adjusted     Therapist goals for Patient:   Short Term Goals: To be achieved in: 2 weeks  1. Independent in HEP and progression per patient tolerance, in order to prevent re-injury. []? Progressing: []? Met: []? Not Met: []? Adjusted  2. Patient will have a decrease in pain to facilitate improvement in movement, function, and ADLs as indicated by Functional Deficits. []? Progressing: []? Met: []? Not Met: []? Adjusted     Long Term Goals: To be achieved in: 6 weeks  1. Disability index score of 10% or less for the UEFI or Quick DASH to assist with reaching prior level of function. 6/2: 30%  [x]? Progressing: []? Met: []? Not Met: []? Adjusted  2. Patient will demonstrate increased AROM to 0-140 elbow ROM to allow for proper joint functioning as indicated by patients Functional Deficits. 6/14: Lacking 3-137  [x]? Progressing: []? Met: []? Not Met: []? Adjusted  3. Patient will demonstrate an increase in Strength to 4+ to allow for proper functional mobility as indicated by patients Functional Deficits. 6/14: Avg 4/10 grossly  [x]? Progressing: []? Met: []? Not Met: []? Adjusted  4.  Patient will return to functional activities including full work activity without increased symptoms or restriction. 6/14: Still needs breaks due to pain/fatigue   [x]? Progressing: []? Met: []? Not Met: []? Adjusted  5. Patient will get back to driving without issues or limitations from R UE.   6/14: Pain with repetitive gripping  [x]? Progressing: []? Met: []? Not Met: []? Adjusted         Overall Progression Towards Functional goals/ Treatment Progress Update:  [x] Patient is progressing as expected towards functional goals listed. [] Progression is slowed due to complexities/Impairments listed. [] Progression has been slowed due to co-morbidities. [] Plan just implemented, too soon to assess goals progression <30days   [] Goals require adjustment due to lack of progress  [] Patient is not progressing as expected and requires additional follow up with physician  [] Other    Persisting Functional Limitations/Impairments:  []Sitting []Standing   [x]Transfers  []Sleeping   [x]Reaching [x]Lifting   [x]ADLs [x]Housework  [x]Driving [x]Job related tasks  []Sports/Recreation []Other:    ASSESSMENT:  Pt continues to demonstrate good progress with toleration of PT and interventions as listed per log with increased emphasis on carrying and gripping activity. Pt does report minimal tenderness today mostly around thenar eminence and intermittent lateral elbow however nothing more than mild and non limiting with activity.  Will f/u for reassessment next visit         Treatment/Activity Tolerance:  [x] Pt able to complete treatment [] Patient limited by fatique  [] Patient limited by pain  [] Patient limited by other medical complications  [] Other:     Prognosis: - [x] Good [] Fair  [] Poor    Patient Requires Follow-up: [x] Yes  [] No    Return to Play:    [x]  N/A     []  Stage 1: Intro to Strength   []  Stage 2: Dynamic Strength and Intro to Plyometrics   []  Stage 3: Advanced Plyometrics and Intro to Throwing   []  Stage 4: Sport specific Training/Return to Sport     []  Ready to Return to Play, [employer] Stages   []  Not Ready for Return to Sports   Comments:      PLAN: PT 2x / week for 6 weeks. [x] Continue per plan of care [] Alter current plan (see comments)  [] Plan of care initiated [] Hold pending MD visit [] Discharge    Electronically signed by: Joan Villa, PT DPT, OMT-C    Note: If patient does not return for scheduled/ recommended follow up visits, this note will serve as a discharge from care along with most recent update on progress.

## 2021-06-29 ENCOUNTER — APPOINTMENT (OUTPATIENT)
Dept: PHYSICAL THERAPY | Age: 46
End: 2021-06-29
Payer: COMMERCIAL

## 2021-07-07 ENCOUNTER — HOSPITAL ENCOUNTER (OUTPATIENT)
Dept: PHYSICAL THERAPY | Age: 46
Setting detail: THERAPIES SERIES
Discharge: HOME | End: 2021-07-07
Payer: COMMERCIAL

## 2021-07-07 PROCEDURE — 97530 THERAPEUTIC ACTIVITIES: CPT

## 2021-07-07 PROCEDURE — 97112 NEUROMUSCULAR REEDUCATION: CPT

## 2021-07-07 PROCEDURE — 97110 THERAPEUTIC EXERCISES: CPT

## 2021-07-07 NOTE — PROGRESS NOTES
[address] [facility] Physical Therapy  Phone: [phone]   Fax: [phone]    Physical Therapy Treatment Note/ Progress Report:     Date:  2021    Patient Name:  Zahra Maldonado    :  1975  MRN: [id number]  Restrictions/Precautions:    Medical/Treatment Diagnosis Information:  Diagnosis: S52.124D (ICD-10-CM) - Closed nondisplaced fracture of head of right radius with routine healing, subsequent encounter  Treatment Diagnosis: Decreased R elbow, wrist and shoulder ROM, flexiblity, muscle strength and activation with limitations with ADL's, IADL's, recreational and work activity. Insurance/Certification information:  PT Insurance Information: [place] (24 visits prior to [facility])  Physician Information:  Referring Practitioner: Rolly Jhaveri MD  Plan of care signed (Y/N): [x]  Yes []  No     Date of Patient follow up with Physician:      Progress Report: [x]  Yes  []  No     Date Range for reporting period:  Beginnin/5  Ending: -    Progress report due (10 Rx/or 30 days whichever is less): visit #10 or  (date)     Recertification due (POC duration/ or 90 days whichever is less): visit #12 or  (date)     Visit # Insurance Allowable Auth required? Date Range   15  (soft limit) [x]  Yes - After visit 24  []  No -     Latex Allergy:  [x]NO      []YES  Preferred Language for Healthcare:   [x]English       []other:    Functional Scale:       Date assessed:  Quick DASH: raw score = 43; dysfunction = 72%     Quick DASH: raw score= 24; dysfunction = 30%    6/2  Quick DASH: raw score = 23; dysfunction= 20-39%   7/  UEFI; raw score= 65; dysfunction= 1-19%    7/       Pain level:  0-2/10     SUBJECTIVE:  Pt reports slight modifications with heavy activities d/t R elbow and UE pain and stiffness. Needed to lift a heavy bag of cat litter over the weekend and primarily used L UE to complete task. Has been improving tolerance with decreased ache with work requirements. OBJECTIVE:    Observation:    5/12: +DeQuirevein's sign, tenderness noted along long thumb extensors on dorsal side of thumb    5/7/21  RT elbow extension  AAROM  -7   Test measurements:      7/7/2021   PROM AROM     L R L R   Cervical Flexion            Cervical Extension            Cervical Rotation           Cervical Side-bend           Shoulder Flexion      160   Shoulder Abduction      145   Shoulder External Rotation      C6   Shoulder Internal Rotation      T11   Elbow Flexion    143 140   Elbow Extension    0 Lack 3 from neutral    Pronation      90   Supination      66*   Wrist Flexion        WFL *   Wrist Extension        WFL*   Radial Deviation                RESTRICTIONS/PRECAUTIONS: NWB until 5/28    Exercises/Interventions:   Therapeutic Exercise (04447)  20' Resistance / level Sets / Seconds  Reps Notes/Cues    Wrist extension/ flexion stretching  Standing elbow extension EOB     Wrist supination    Stretching  seated     Supported Ulnar Deviation ROM  Pronated  5/17:    Elbow flexion and stretching     Gripping                   Wrist Ext  Wrist Pron/Sup    3 10 EA  5/24     5/10: Neutral/ Pronated    Supine skull crushers  15# dumbbell  2 10 5/17: Pain-free range    Standing bicep curl with Neutral  4# KB weight 3 10    Bicep curl with wrist rotation 4# med ball     2     15        Wrist pronation/supination with bar/weight 2# on Tiger Roller, mid shaft   2 15    CKC wrist ext stretch:   -wrist in pronation  -wrist in supination  20\" holds 2 each                                       Therapeutic Activities (64013)       Pt education regarding stretching, desk posture, OTC analgesic options (volt.  Gel) and progression of stretching etc.        Walking holding weight various   10# KB  12# dumbbell  15# KB10' walk X 1 each 6/21-Most difficulty with 10# neutral kettle hold(not the handle) Table pushups  2 10  carry on R UE 7.5 KB 15' 4 Walking with UE abducted to 30deg 7.5 KB15' Neuromuscular Re-ed (93779)       Supine SA Punch +  7.5 KB  3\" 2x10      S/L Punch  2# MB  2  10  B shoulder ext with cable column 35# 2 10     Row with cable column 45# 3 10  6/7 7/7-supinated     Triceps extensions  35# 3 10x 5/19  6/7-Neutral and Pron   Manual Intervention (35703)8'       Shld /GH Mobs       Post Cap mobs       Rhythmic Stabs   5/10: Supine, elbow 90 deg on table        PROM MT Elbow Ext   Pro/Sup  Rad/Uln Dev  Wrist Flex/Ext  3'  5/10: Supine     Modalities: 5/7,5/10, 5/17, 5/26: CP x 10' to prox radius; Seated with elbow bent on table     Pt. Education:  -pt educated on diagnosis, prognosis and expectations for rehab  -all pt questions were answered    Home Exercise Program:      Access Code: [email or web address]  URL: [email or web address]  Date: 07/07/2021  Prepared by: Hiwot Rhodes    Exercises  Standing Tricep Extensions with Resistance - 1 x daily - 7 x weekly - 10 reps - 3 sets  Standing Single Arm Elbow Flexion with Resistance - 1 x daily - 7 x weekly - 10 reps - 3 sets  Standing Shoulder Row with Anchored Resistance - 1 x daily - 7 x weekly - 10 reps - 3 sets  Shoulder extension with resistance - Neutral - 1 x daily - 7 x weekly - 10 reps - 3 sets  Seated Pronation Supination with Dumbbell - 1 x daily - 7 x weekly - 3 sets - 10 reps  Seated Single Arm Bicep Curls with Rotation and Dumbbell - 1 x daily - 7 x weekly - 3 sets - 10 reps  Push Up on Table - 1 x daily - 7 x weekly - 3 sets - 10 reps        Access Code: [email or web address]  URL: [email or web address]  Date: 06/07/2021  Prepared by: Hiwot Rhodes    Exercises  Standing Tricep Extensions with Resistance - 1 x daily - 7 x weekly - 10 reps - 3 sets  Standing Single Arm Elbow Flexion with Resistance - 1 x daily - 7 x weekly - 10 reps - 3 sets  Standing Shoulder Row with Anchored Resistance - 1 x daily - 7 x weekly - 10 reps - 3 sets  Shoulder extension with resistance - Neutral - 1 x daily - 7 x weekly - 10 reps - 3 sets    Access Code: [email or web address]  URL: [email or web address]  Date: 05/05/2021  Prepared by: Erica Sanders    Exercises  Seated Wrist Flexion with Overpressure - 2 x daily - 7 x weekly - 4 reps - 15 hold  Wrist Extension Stretch Pronated - 2 x daily - 7 x weekly - 4 reps - 15 hold  Supported Elbow Flexion Extension PROM - 2 x daily - 7 x weekly - 4 reps - 15 hold  Standing Wrist Extension Stretch - 2 x daily - 7 x weekly - 4 reps - 15 hold  Seated Wrist Supination Stretch - 2 x daily - 7 x weekly - 4 reps - 15 hold      Therapeutic Exercise and NMR EXR  [x] (31112) Provided verbal/tactile cueing for activities related to strengthening, flexibility, endurance, ROM  for improvements in scapular, scapulothoracic and UE control with self care, reaching, carrying, lifting, house/yardwork, driving/computer work.    [] (61917) Provided verbal/tactile cueing for activities related to improving balance, coordination, kinesthetic sense, posture, motor skill, proprioception  to assist with  scapular, scapulothoracic and UE control with self care, reaching, carrying, lifting, house/yardwork, driving/computer work.  [] (12554) Therapist is in constant attendance of 2 or more patients providing skilled therapy interventions, but not providing any significant amount of measurable one-on-one time to either patient, for improvements in cervical, scapular, scapulothoracic and UE control with self care, reaching, carrying, lifting, house/yardwork, driving, computer work. Therapeutic Activities:    [] (85394 or 68188) Provided verbal/tactile cueing for activities related to improving balance, coordination, kinesthetic sense, posture, motor skill, proprioception and motor activation to allow for proper function of scapular, scapulothoracic and UE control with self care, carrying, lifting, driving/computer work.      Home Exercise Program:    [x] (49572) Reviewed/Progressed HEP activities related to strengthening, flexibility, be achieved in: 6 weeks  1. Disability index score of 10% or less for the UEFI or Quick DASH to assist with reaching prior level of function. 6/2: 30%  7/7; 1-19%  [x]? Progressing: []? Met: []? Not Met: []? Adjusted  2. Patient will demonstrate increased AROM to 0-140 elbow ROM to allow for proper joint functioning as indicated by patients Functional Deficits. 6/14: Lacking 3-137; 7/7: lacking 3-140  [x]? Progressing: []? Met: []? Not Met: []? Adjusted  3. Patient will demonstrate an increase in Strength to 4+ to allow for proper functional mobility as indicated by patients Functional Deficits. 6/14: Avg 4/10 grossly  [x]? Progressing: []? Met: []? Not Met: []? Adjusted  4. Patient will return to functional activities including full work activity without increased symptoms or restriction. 6/14: Still needs breaks due to pain/fatigue; 7/7: Compensating with L UE   [x]? Progressing: []? Met: []? Not Met: []? Adjusted  5. Patient will get back to driving without issues or limitations from R UE.   6/14: Pain with repetitive gripping  []? Progressing: [x]? Met: []? Not Met: []? Adjusted         Overall Progression Towards Functional goals/ Treatment Progress Update:  [x] Patient is progressing as expected towards functional goals listed. [] Progression is slowed due to complexities/Impairments listed. [] Progression has been slowed due to co-morbidities. [] Plan just implemented, too soon to assess goals progression <30days   [] Goals require adjustment due to lack of progress  [] Patient is not progressing as expected and requires additional follow up with physician  [] Other    Persisting Functional Limitations/Impairments:  []Sitting []Standing   [x]Transfers  []Sleeping   [x]Reaching [x]Lifting   [x]ADLs [x]Housework  [x]Driving [x]Job related tasks  []Sports/Recreation []Other:    ASSESSMENT: HEP updated this date with increased resistance provided for Tband strengthening.  Emphasis with new HEP is to improve R bicep strength to decrease supination ROM deficits and strength still present in R UE. Pt pleased with progress with skilled therapy with improvements in function and endurance noted by pt. Pt would like to begin transition to D/C and will continue with skilled therapy every other week for 2 more visits with HEP strengthening performed daily. Treatment/Activity Tolerance:  [x] Pt able to complete treatment [] Patient limited by fatique  [] Patient limited by pain  [] Patient limited by other medical complications  [] Other:     Prognosis: - [x] Good [] Fair  [] Poor    Patient Requires Follow-up: [x] Yes  [] No    Return to Play:    [x]  N/A     []  Stage 1: Intro to Strength   []  Stage 2: Dynamic Strength and Intro to Plyometrics   []  Stage 3: Advanced Plyometrics and Intro to Throwing   []  Stage 4: Sport specific Training/Return to Sport     []  Ready to Return to Play, [employer]   []  Not Ready for Return to Sports   Comments:      PLAN: PT 2x / week for 6 weeks. [x] Continue per plan of care [] Alter current plan (see comments)  [] Plan of care initiated [] Hold pending MD visit [] Discharge    Electronically signed by: Herberth Vieyra, PTA [id number]    Note: If patient does not return for scheduled/ recommended follow up visits, this note will serve as a discharge from care along with most recent update on progress.

## 2021-07-20 ENCOUNTER — HOSPITAL ENCOUNTER (OUTPATIENT)
Dept: PHYSICAL THERAPY | Age: 46
Setting detail: THERAPIES SERIES
Discharge: HOME | End: 2021-07-20
Payer: COMMERCIAL

## 2021-07-20 PROCEDURE — 97110 THERAPEUTIC EXERCISES: CPT

## 2021-07-20 PROCEDURE — 97112 NEUROMUSCULAR REEDUCATION: CPT

## 2021-07-20 PROCEDURE — 97530 THERAPEUTIC ACTIVITIES: CPT

## 2021-07-20 NOTE — FLOWSHEET NOTE
[address] [facility]  Phone: [phone]   Fax: [phone]    Physical Therapy Treatment Note/ Progress Report:     Date:  2021    Patient Name:  João Sargent    :  1975  MRN: [id number]  Restrictions/Precautions:    Medical/Treatment Diagnosis Information:  Diagnosis: S52.124D (ICD-10-CM) - Closed nondisplaced fracture of head of right radius with routine healing, subsequent encounter  Treatment Diagnosis: Decreased R elbow, wrist and shoulder ROM, flexiblity, muscle strength and activation with limitations with ADL's, IADL's, recreational and work activity. Insurance/Certification information:  PT Insurance Information: [place] (24 visits prior to [facility] [place])  Physician Information:  Referring Practitioner: Reggie Yusuf MD  Plan of care signed (Y/N): [x]  Yes []  No     Date of Patient follow up with Physician:      Progress Report: [x]  Yes  []  No     Date Range for reporting period:  Beginnin/5  Ending: -    Progress report due (10 Rx/or 30 days whichever is less): visit #10 or  (date)     Recertification due (POC duration/ or 90 days whichever is less): visit #12 or  (date)     Visit # Insurance Allowable Auth required? Date Range   16 24 (soft limit) [x]  Yes - After visit 24  []  No -     Latex Allergy:  [x]NO      []YES  Preferred Language for Healthcare:   [x]English       []other:    Functional Scale:       Date assessed:  Quick DASH: raw score = 43; dysfunction = 72%     Quick DASH: raw score= 24; dysfunction = 30%    6/2  Quick DASH: raw score = 23; dysfunction= 20-39%   7/7  UEFI; raw score= 65; dysfunction= 1-19%    7/7       Pain level:  0-2/10     SUBJECTIVE:  Pt reports he continues to progress but still struggles in certain areas. Still lifting things that are heavy, such as bag of cat litter, his elbow does have some pain in it as well as working a full day at the computer he gets some stiffness and pain.  Still wears a brace on his elbow at times when his elbow gets sore or he knows he is going to be doing a lot but tries to not wear it much. OBJECTIVE:    Observation:    5/12: +DeQuirevein's sign, tenderness noted along long thumb extensors on dorsal side of thumb    5/7/21  RT elbow extension  AAROM  -7   Test measurements:      7/7/2021   PROM AROM     L R L R   Cervical Flexion            Cervical Extension            Cervical Rotation           Cervical Side-bend           Shoulder Flexion      160   Shoulder Abduction      145   Shoulder External Rotation      C6   Shoulder Internal Rotation      T11   Elbow Flexion    143 140   Elbow Extension    0 Lack 3 from neutral    Pronation      90   Supination      66*   Wrist Flexion        WFL *   Wrist Extension        WFL*   Radial Deviation                RESTRICTIONS/PRECAUTIONS: NWB until 5/28    Exercises/Interventions:   Therapeutic Exercise (02654)  20' Resistance / level Sets / Seconds  Reps Notes/Cues    Wrist extension/ flexion stretching  Standing elbow extension EOB     Wrist supination    Stretching  seated     Supported Ulnar Deviation ROM  Pronated  5/17:    Elbow flexion and stretching     Gripping                   Wrist Ext  Wrist Pron/Sup    3 10 EA  5/24     5/10: Neutral/ Pronated    Supine skull crushers  5/17: Pain-free range    Standing bicep curl with Neutral  4# KB weight 3 10    Bicep curl with wrist rotation 7.5# med ball     3     10        Wrist pronation/supination with bar/weight 2# on Tiger Roller, mid shaft   2 15    CKC wrist ext stretch:   -wrist in pronation  -wrist in supination  20\" holds 2 each                                       Therapeutic Activities (68942)       Pt education regarding stretching, desk posture, OTC analgesic options (volt.  Gel) and progression of stretching etc.        Walking holding weight various   10# KB  15# KB  20# KB10' walk X 1 each 6/21-Most difficulty with 10# neutral kettle hold(not the reps - 3 sets  Standing Shoulder Row with Anchored Resistance - 1 x daily - 7 x weekly - 10 reps - 3 sets  Shoulder extension with resistance - Neutral - 1 x daily - 7 x weekly - 10 reps - 3 sets    Access Code: [email or web address]  URL: [email or web address]  Date: 05/05/2021  Prepared by: Charbel Ortiz    Exercises  Seated Wrist Flexion with Overpressure - 2 x daily - 7 x weekly - 4 reps - 15 hold  Wrist Extension Stretch Pronated - 2 x daily - 7 x weekly - 4 reps - 15 hold  Supported Elbow Flexion Extension PROM - 2 x daily - 7 x weekly - 4 reps - 15 hold  Standing Wrist Extension Stretch - 2 x daily - 7 x weekly - 4 reps - 15 hold  Seated Wrist Supination Stretch - 2 x daily - 7 x weekly - 4 reps - 15 hold      Therapeutic Exercise and NMR EXR  [x] (67260) Provided verbal/tactile cueing for activities related to strengthening, flexibility, endurance, ROM  for improvements in scapular, scapulothoracic and UE control with self care, reaching, carrying, lifting, house/yardwork, driving/computer work.    [] (60876) Provided verbal/tactile cueing for activities related to improving balance, coordination, kinesthetic sense, posture, motor skill, proprioception  to assist with  scapular, scapulothoracic and UE control with self care, reaching, carrying, lifting, house/yardwork, driving/computer work.  [] (00378) Therapist is in constant attendance of 2 or more patients providing skilled therapy interventions, but not providing any significant amount of measurable one-on-one time to either patient, for improvements in cervical, scapular, scapulothoracic and UE control with self care, reaching, carrying, lifting, house/yardwork, driving, computer work.      Therapeutic Activities:    [] (38501 or 40247) Provided verbal/tactile cueing for activities related to improving balance, coordination, kinesthetic sense, posture, motor skill, proprioception and motor activation to allow for proper function of scapular, scapulothoracic and UE control with self care, carrying, lifting, driving/computer work. Home Exercise Program:    [x] (67969) Reviewed/Progressed HEP activities related to strengthening, flexibility, endurance, ROM of scapular, scapulothoracic and UE control with self care, reaching, carrying, lifting, house/yardwork, driving/computer work  [] (22806) Reviewed/Progressed HEP activities related to improving balance, coordination, kinesthetic sense, posture, motor skill, proprioception of scapular, scapulothoracic and UE control with self care, reaching, carrying, lifting, house/yardwork, driving/computer work      Manual Treatments:  PROM / STM / Oscillations-Mobs:  G-I, II, III, IV (PA's, Inf., Post.)  [x] (41915) Provided manual therapy to mobilize soft tissue/joints of cervical/CT, scapular GHJ and UE for the purpose of modulating pain, promoting relaxation,  increasing ROM, reducing/eliminating soft tissue swelling/inflammation/restriction, improving soft tissue extensibility and allowing for proper ROM for normal function with self care, reaching, carrying, lifting, house/yardwork, driving/computer work      Charges:  Timed Code Treatment Minutes: 45   Total Treatment Minutes: 45       [] EVAL - LOW (64423)   [] EVAL - MOD (87042)  [] EVAL - HIGH (64464)  [] RE-EVAL (90866)  [x] EV(71316) x 1     [] Ionto  [x] NMR (86161) x 1    [] Vaso  [] Manual (63896) x      [] Ultrasound:  [x] TA x 1      [] [facility] (07133)  [] Home Management Training x    [] ES (un) (49652):   [] Aquatic    [] ES(attended) (47332)  [] Other:                     GOALS:  Patient stated goal: get back to prior activity and UE usage without issues  []? Progressing: []? Met: []? Not Met: []? Adjusted     Therapist goals for Patient:   Short Term Goals: To be achieved in: 2 weeks  1. Independent in HEP and progression per patient tolerance, in order to prevent re-injury. []? Progressing: []? Met: []? Not Met: []? Adjusted  2.  Patient will have a decrease in pain to facilitate improvement in movement, function, and ADLs as indicated by Functional Deficits. []? Progressing: []? Met: []? Not Met: []? Adjusted     Long Term Goals: To be achieved in: 6 weeks  1. Disability index score of 10% or less for the UEFI or Quick DASH to assist with reaching prior level of function. 6/2: 30%  7/7; 1-19%  [x]? Progressing: []? Met: []? Not Met: []? Adjusted  2. Patient will demonstrate increased AROM to 0-140 elbow ROM to allow for proper joint functioning as indicated by patients Functional Deficits. 6/14: Lacking 3-137; 7/7: lacking 3-140  [x]? Progressing: []? Met: []? Not Met: []? Adjusted  3. Patient will demonstrate an increase in Strength to 4+ to allow for proper functional mobility as indicated by patients Functional Deficits. 6/14: Avg 4/10 grossly  [x]? Progressing: []? Met: []? Not Met: []? Adjusted  4. Patient will return to functional activities including full work activity without increased symptoms or restriction. 6/14: Still needs breaks due to pain/fatigue; 7/7: Compensating with L UE   [x]? Progressing: []? Met: []? Not Met: []? Adjusted  5. Patient will get back to driving without issues or limitations from R UE.   6/14: Pain with repetitive gripping  []? Progressing: [x]? Met: []? Not Met: []? Adjusted         Overall Progression Towards Functional goals/ Treatment Progress Update:  [x] Patient is progressing as expected towards functional goals listed. [] Progression is slowed due to complexities/Impairments listed. [] Progression has been slowed due to co-morbidities.   [] Plan just implemented, too soon to assess goals progression <30days   [] Goals require adjustment due to lack of progress  [] Patient is not progressing as expected and requires additional follow up with physician  [] Other    Persisting Functional Limitations/Impairments:  []Sitting []Standing   [x]Transfers []Sleeping   [x]Reaching [x]Lifting   [x]ADLs [x]Housework  [x]Driving [x]Job related tasks  []Sports/Recreation []Other:    ASSESSMENT:  Much focus on today's session was on strength and functional activity progressions with variations of weights, activities and forces through elbow, all of which were tolerated well, mild reports of \"discomfort\" with increase in fatigue especially with higher level weights however no limiting pain. Overall pt continues to progress well with PT POC, biggest limiting factor at this point appears to be lack of full strength and activity tolerance. Will reassess next visit and assess need for further PT vs d/c to HEP. Treatment/Activity Tolerance:  [x] Pt able to complete treatment [] Patient limited by fatique  [] Patient limited by pain  [] Patient limited by other medical complications  [] Other:     Prognosis: - [x] Good [] Fair  [] Poor    Patient Requires Follow-up: [x] Yes  [] No    Return to Play:    [x]  N/A     []  Stage 1: Intro to Strength   []  Stage 2: Dynamic Strength and Intro to Plyometrics   []  Stage 3: Advanced Plyometrics and Intro to Throwing   []  Stage 4: Sport specific Training/Return to Sport     []  Ready to Return to Play, [employer]   []  Not Ready for Return to Sports   Comments:      PLAN: PT 2x / week for 6 weeks. [x] Continue per plan of care [] Alter current plan (see comments)  [] Plan of care initiated [] Hold pending MD visit [] Discharge    Electronically signed by: Reji Jeffrey, PT DPT, OMT-C    Note: If patient does not return for scheduled/ recommended follow up visits, this note will serve as a discharge from care along with most recent update on progress.

## 2021-07-22 ENCOUNTER — HOSPITAL ENCOUNTER (OUTPATIENT)
Dept: PHYSICAL THERAPY | Age: 46
Setting detail: THERAPIES SERIES
Discharge: HOME | End: 2021-07-22
Payer: COMMERCIAL

## 2021-07-22 PROCEDURE — 97530 THERAPEUTIC ACTIVITIES: CPT

## 2021-07-22 PROCEDURE — 97110 THERAPEUTIC EXERCISES: CPT

## 2021-07-22 NOTE — PLAN OF CARE
is no longer responding to skilled PT intervention    []Patient is getting worse and would benefit from return to referring MD   []Patient unable to adhere to initial POC   []Other: -    ASSESSMENT:  Pt has continued to progress really well with PT POC with good progress in all areas. Increased time take today for reassessment, pt demonstrates overall good ROM with only minor limitations with full supination, strength progressing well and although still showing deficit of lack of full strength on R elbow and wrist, pt has made progress and no longer reporting pain with any testing. Pt reporting no limitations with functional activity including all ADL's, IADL's, recreational or work activity and although still gets some intermittent soreness, reports no significant pain or limitations. At this time, pt has met all but 1 goal and will trial d/c to HEP over the next 4 week f/u with PT prn, if no issue arise, pt will be formally discharged from PT. Date range of Visits:  -   Total Visits: 17    Recommendation:    []Continue PT -x / wk for - weeks. [x]Hold PT, trial d/c to HEP and f/u with PT prn over the next 4 weeks. If no issues will d/c to HEP        Physical Therapy Treatment Note/ Progress Report:     Date:  2021    Patient Name:  Abdullahi Lopez    :  1975  MRN: [id number]  Restrictions/Precautions:    Medical/Treatment Diagnosis Information:  Diagnosis: S52.124D (ICD-10-CM) - Closed nondisplaced fracture of head of right radius with routine healing, subsequent encounter  Treatment Diagnosis: Decreased R elbow, wrist and shoulder ROM, flexiblity, muscle strength and activation with limitations with ADL's, IADL's, recreational and work activity.   Insurance/Certification information:  PT Insurance Information: [place] (24 visits prior to [facility])  Physician Information:  Referring Practitioner: Paulo Singh MD  Plan of care signed (Y/N): [x]  Yes []  No     Date of Patient follow up with Physician:      Progress Report: [x]  Yes  []  No     Date Range for reporting period:  Beginnin/5  Ending: -    Progress report due (10 Rx/or 30 days whichever is less): visit #10 or  (date)     Recertification due (POC duration/ or 90 days whichever is less): visit #12 or  (date)     Visit # Insurance Allowable Auth required? Date Range   17  (soft limit) [x]  Yes - After visit 24  []  No -     Latex Allergy:  [x]NO      []YES  Preferred Language for Healthcare:   [x]English       []other:    Functional Scale:       Date assessed:  Quick DASH: raw score = 43; dysfunction = 72%     Quick DASH: raw score= 24; dysfunction = 30%      Quick DASH: raw score = 23; dysfunction= 20-39%     UEFI; raw score= 65; dysfunction= 1-19%           Pain level:  0-2/10     SUBJECTIVE:  Pt reports he did well after last visit, a little sore but not bad. This morning he woke up with increased soreness in the elbow, not sure if it is due to tossing and turning last night but just sore to start with. Pt reports overall feels his elbow and wrist and doing well, not having any consistent or limiting pain in either and strength is progressing. Feels at this time he is ready to transition to HEP and see how things go over the next few weeks.           OBJECTIVE:    Observation:    : +DeQuirevein's sign, tenderness noted along long thumb extensors on dorsal side of thumb    21  RT elbow extension  AAROM  -7   Test measurements:      2021   PROM AROM     L R L R   Cervical Flexion            Cervical Extension            Cervical Rotation           Cervical Side-bend           Shoulder Flexion      160   Shoulder Abduction      145   Shoulder External Rotation      C6   Shoulder Internal Rotation      T11   Elbow Flexion    143 140   Elbow Extension    0 Lack 3 from neutral    Pronation      90   Supination      66*   Wrist Flexion        WFL *   Wrist Extension        WFL* Radial Deviation                RESTRICTIONS/PRECAUTIONS: NWB until 5/28    Exercises/Interventions:   Therapeutic Exercise (13165)  20' Resistance / level Sets / Seconds  Reps Notes/Cues    Wrist extension/ flexion stretching  Standing elbow extension EOB     Wrist supination    Stretching  seated     Supported Ulnar Deviation ROM  Pronated  5/17:    Elbow flexion and stretching     Gripping                   Wrist Ext  Wrist Pron/Sup    3 10 EA  5/24     5/10: Neutral/ Pronated    Supine skull crushers  5/17: Pain-free range    Standing bicep curl with Neutral     Bicep curl with wrist rotation                                             Therapeutic Activities (98609)       Pt education regarding stretching, desk posture, OTC analgesic options (volt. Gel) and progression of stretching etc.        Table pushups  2 10               Neuromuscular Re-ed (11578)                            Supine SA Punch +      S/L Punch     6/7 7/7-supinated     5/19 6/7-Neutral and Pron   Manual Intervention (55859)8'       Shld /GH Mobs       Post Cap mobs       Rhythmic Stabs   5/10: Supine, elbow 90 deg on table        PROM MT Elbow Ext   Pro/Sup  Rad/Uln Dev  Wrist Flex/Ext  3'  5/10: Supine     Modalities: 5/7,5/10, 5/17, 5/26: CP x 10' to prox radius; Seated with elbow bent on table     Pt. Education:  -pt educated on diagnosis, prognosis and expectations for rehab  -all pt questions were answered    Home Exercise Program:    HEP updates on 7/22: Reviewed in clinic today performing 1 set 15 reps of each   Access Code: [email or web address]  URL: [email or web address]  Date: 07/22/2021  Prepared by: Jumana Hernandez    Exercises  Standing Shoulder Row with Anchored Resistance - 1 x daily - 7 x weekly - 3 sets - 15 reps  Elbow Extension with Anchored Resistance - 1 x daily - 7 x weekly - 3 sets - 15 reps  Standing Single Arm Elbow Flexion with Resistance - 1 x daily - 7 x weekly - 3 sets - 15 reps  Shoulder External Rotation and Scapular Retraction with Resistance - 1 x daily - 7 x weekly - 3 sets - 10 reps  Alternating Punch with Resistance - 1 x daily - 7 x weekly - 3 sets - 15 reps        Access Code: [email or web address]  URL: [email or web address]  Date: 07/07/2021  Prepared by: Byron Nolan    Exercises  Standing Tricep Extensions with Resistance - 1 x daily - 7 x weekly - 10 reps - 3 sets  Standing Single Arm Elbow Flexion with Resistance - 1 x daily - 7 x weekly - 10 reps - 3 sets  Standing Shoulder Row with Anchored Resistance - 1 x daily - 7 x weekly - 10 reps - 3 sets  Shoulder extension with resistance - Neutral - 1 x daily - 7 x weekly - 10 reps - 3 sets  Seated Pronation Supination with Dumbbell - 1 x daily - 7 x weekly - 3 sets - 10 reps  Seated Single Arm Bicep Curls with Rotation and Dumbbell - 1 x daily - 7 x weekly - 3 sets - 10 reps  Push Up on Table - 1 x daily - 7 x weekly - 3 sets - 10 reps        Access Code: [email or web address]  URL: [email or web address]  Date: 06/07/2021  Prepared by: Byron Nolan    Exercises  Standing Tricep Extensions with Resistance - 1 x daily - 7 x weekly - 10 reps - 3 sets  Standing Single Arm Elbow Flexion with Resistance - 1 x daily - 7 x weekly - 10 reps - 3 sets  Standing Shoulder Row with Anchored Resistance - 1 x daily - 7 x weekly - 10 reps - 3 sets  Shoulder extension with resistance - Neutral - 1 x daily - 7 x weekly - 10 reps - 3 sets    Access Code: [email or web address]  URL: [email or web address]  Date: 05/05/2021  Prepared by: Irlanda Ann    Exercises  Seated Wrist Flexion with Overpressure - 2 x daily - 7 x weekly - 4 reps - 15 hold  Wrist Extension Stretch Pronated - 2 x daily - 7 x weekly - 4 reps - 15 hold  Supported Elbow Flexion Extension PROM - 2 x daily - 7 x weekly - 4 reps - 15 hold  Standing Wrist Extension Stretch - 2 x daily - 7 x weekly - 4 reps - 15 hold  Seated Wrist Supination Stretch - 2 x daily - 7 x weekly - 4 reps - 15 hold      Therapeutic Exercise and NMR EXR  [x] (25895) Provided verbal/tactile cueing for activities related to strengthening, flexibility, endurance, ROM  for improvements in scapular, scapulothoracic and UE control with self care, reaching, carrying, lifting, house/yardwork, driving/computer work.    [] (27281) Provided verbal/tactile cueing for activities related to improving balance, coordination, kinesthetic sense, posture, motor skill, proprioception  to assist with  scapular, scapulothoracic and UE control with self care, reaching, carrying, lifting, house/yardwork, driving/computer work.  [] (51367) Therapist is in constant attendance of 2 or more patients providing skilled therapy interventions, but not providing any significant amount of measurable one-on-one time to either patient, for improvements in cervical, scapular, scapulothoracic and UE control with self care, reaching, carrying, lifting, house/yardwork, driving, computer work. Therapeutic Activities:    [] (62352 or 49534) Provided verbal/tactile cueing for activities related to improving balance, coordination, kinesthetic sense, posture, motor skill, proprioception and motor activation to allow for proper function of scapular, scapulothoracic and UE control with self care, carrying, lifting, driving/computer work.      Home Exercise Program:    [x] (61689) Reviewed/Progressed HEP activities related to strengthening, flexibility, endurance, ROM of scapular, scapulothoracic and UE control with self care, reaching, carrying, lifting, house/yardwork, driving/computer work  [] (60476) Reviewed/Progressed HEP activities related to improving balance, coordination, kinesthetic sense, posture, motor skill, proprioception of scapular, scapulothoracic and UE control with self care, reaching, carrying, lifting, house/yardwork, driving/computer work      Manual Treatments:  PROM / STM / Oscillations-Mobs:  G-I, II, III, IV (PA's, Inf., Post.)  [x] (00282) Provided manual therapy to mobilize soft tissue/joints of cervical/CT, scapular GHJ and UE for the purpose of modulating pain, promoting relaxation,  increasing ROM, reducing/eliminating soft tissue swelling/inflammation/restriction, improving soft tissue extensibility and allowing for proper ROM for normal function with self care, reaching, carrying, lifting, house/yardwork, driving/computer work      Charges:  Timed Code Treatment Minutes: 45   Total Treatment Minutes: 45       [] EVAL - LOW (37081)   [] EVAL - MOD (73421)  [] EVAL - HIGH (98085)  [] RE-EVAL (85787)  [x] CP(68018) x 1     [] Ionto  [x] NMR (30990) x 1    [] Vaso  [] Manual (69860) x      [] Ultrasound:  [x] TA x 1      [] Mech Traction (29777)  [] Home Management Training x    [] ES (un) (51777):   [] Aquatic    [] ES(attended) (51765)  [] Other:                     GOALS:  Patient stated goal: get back to prior activity and UE usage without issues  []? Progressing: []? Met: []? Not Met: []? Adjusted     Therapist goals for Patient:   Short Term Goals: To be achieved in: 2 weeks  1. Independent in HEP and progression per patient tolerance, in order to prevent re-injury. []? Progressing: []? Met: []? Not Met: []? Adjusted  2. Patient will have a decrease in pain to facilitate improvement in movement, function, and ADLs as indicated by Functional Deficits. []? Progressing: []? Met: []? Not Met: []? Adjusted     Long Term Goals: To be achieved in: 6 weeks  1. Disability index score of 10% or less for the UEFI or Quick DASH to assist with reaching prior level of function. 7/7; 1-19%  [x]? Progressing: []? Met: []? Not Met: []? Adjusted  2. Patient will demonstrate increased AROM to 0-140 elbow ROM to allow for proper joint functioning as indicated by patients Functional Deficits. []? Progressing: [x]? Met: []? Not Met: []? Adjusted  3. Patient will demonstrate an increase in Strength to 4+ to allow for proper functional mobility as indicated by patients Functional Deficits.    []? Progressing: [x]? Met: []? Not Met: []? Adjusted  4. Patient will return to functional activities including full work activity without increased symptoms or restriction. []? Progressing: [x]? Met: []? Not Met: []? Adjusted  5. Patient will get back to driving without issues or limitations from R UE.     []? Progressing: [x]? Met: []? Not Met: []? Adjusted         Overall Progression Towards Functional goals/ Treatment Progress Update:  [x] Patient is progressing as expected towards functional goals listed. [] Progression is slowed due to complexities/Impairments listed. [] Progression has been slowed due to co-morbidities. [] Plan just implemented, too soon to assess goals progression <30days   [] Goals require adjustment due to lack of progress  [] Patient is not progressing as expected and requires additional follow up with physician  [] Other    Persisting Functional Limitations/Impairments:  []Sitting []Standing   [x]Transfers  []Sleeping   [x]Reaching [x]Lifting   [x]ADLs [x]Housework  [x]Driving [x]Job related tasks  []Sports/Recreation []Other:    ASSESSMENT:  Pt has continued to progress really well with PT POC with good progress in all areas. Increased time take today for reassessment, pt demonstrates overall good ROM with only minor limitations with full supination, strength progressing well and although still showing deficit of lack of full strength on R elbow and wrist, pt has made progress and no longer reporting pain with any testing. Pt reporting no limitations with functional activity including all ADL's, IADL's, recreational or work activity and although still gets some intermittent soreness, reports no significant pain or limitations. At this time, pt has met all but 1 goal and will trial d/c to HEP over the next 4 week f/u with PT prn, if no issue arise, pt will be formally discharged from PT.             Treatment/Activity Tolerance:  [x] Pt able to complete treatment [] Patient limited by patel  [] Patient limited by pain  [] Patient limited by other medical complications  [] Other:     Prognosis: - [x] Good [] Fair  [] Poor    Patient Requires Follow-up: [x] Yes  [] No    Return to Play:    [x]  N/A     []  Stage 1: Intro to Strength   []  Stage 2: Dynamic Strength and Intro to Plyometrics   []  Stage 3: Advanced Plyometrics and Intro to Throwing   []  Stage 4: Sport specific Training/Return to Sport     []  Ready to Return to Play, [employer]   []  Not Ready for Return to Sports   Comments:      PLAN: PT 2x / week for 6 weeks. [] Continue per plan of care [] Alter current plan (see comments)  [] Plan of care initiated [x] Hold pending MD visit [] Discharge    Electronically signed by: Irlanda Ann PT DPT, OMT-C    Note: If patient does not return for scheduled/ recommended follow up visits, this note will serve as a discharge from care along with most recent update on progress.

## 2021-08-20 ENCOUNTER — TELEPHONE (OUTPATIENT)
Dept: ORTHOPEDIC SURGERY | Age: 46
End: 2021-08-20

## 2021-08-20 NOTE — TELEPHONE ENCOUNTER
PATIENT REQUESTING ORDER FOR ADDITIONAL PHYSICAL THERAPY.  [facility] [phone]

## 2021-09-01 ENCOUNTER — OFFICE VISIT (OUTPATIENT)
Dept: ORTHOPEDIC SURGERY | Age: 46
End: 2021-09-01
Payer: COMMERCIAL

## 2021-09-01 VITALS — WEIGHT: 260 LBS | BODY MASS INDEX: 39.4 KG/M2 | HEIGHT: 68 IN

## 2021-09-01 DIAGNOSIS — S86.112D STRAIN OF GASTROCNEMIUS MUSCLE OF LEFT LOWER EXTREMITY, SUBSEQUENT ENCOUNTER: Primary | ICD-10-CM

## 2021-09-01 PROCEDURE — 99213 OFFICE O/P EST LOW 20 MIN: CPT | Performed by: PHYSICIAN ASSISTANT

## 2021-09-01 NOTE — PROGRESS NOTES
Patient Name: Aparna Michaels  Medical Record Number: [id number]  YOB: 1975  Date of Encounter: 9/1/2021     Chief Complaint   Patient presents with    Follow-up     [facility], doi 9/19/18. Left calf. History of Present Illness:   Mr. Aparna Michaels is almost 3 years out from his initial left gastrocnemius strain during a work injury on 9/19/2018. Patient has continued to have chronic left calf pain since that time. Patient has received a second opinion with Dr. Jerrod Martinez. Patient's insurance denied PRP injections. Patient also received a second opinion from foot and ankle surgeon, Dr. Kaley Neff in January of this year. Dr. Kaley Neff recommended additional physical therapy including Graston Technique and potentially dry needling as well as heel cord stretches. Patient has not done any additional physical therapy since that time. He presents today stating his calf still feels weak and like \"something is going to snap\". He rates his pain a 3/10. He is requesting additional physical therapy. The patient's past medical history, medications, allergies, family history, social history, and review of systems have been reviewed, and dated and are recorded in the chart under the 'MEDIA\" tab. Physical Exam:    Mr. Aparna Michaels appears well, he is in no apparent distress, he demonstrates appropriate mood & affect. He is alert and oriented to person, place and time. Ht 5' 8\" (1.727 m)   Wt 260 lb (117.9 kg)   BMI 39.53 kg/m²     On examination of patient's left calf there is no swelling or ecchymosis. Patient still has mild tenderness on palpation about the distal medial gastroc. Patient has tight hamstrings and gastrocnemius with range of motion testing. Strength is 5/5. Patient has positive Silfverskiold test.         Radiology:     Left tibia/fibula MRI completed 10/10/2018:     Impression    1.  Findings consistent with tearing of the myotendinous junction of the    inferior medial head of the gastrocnemius muscle with slight elevation/mild    1.9 cm retraction of the inferior medial head of the gastrocnemius muscle. Concurrent plantaris tendon tear is difficult to entirely exclude. 2. Mild edema in the subcutaneous fat about the left leg, more so than the    right. 3. No acute osseous abnormality identified. The findings were sent to the Radiology Results [address] at 10:14    am on 10/10/2018 to be communicated to a licensed caregiver.             Left tibia/fibula MRI completed 11/22/2019:     Impression    1.  Sequela of remote medial gastrocnemius musculotendinous junction partial    tear with scarring.  Mild atrophy of the medial gastrocnemius muscle at the    musculotendinous junction has developed since prior exam.  No significant    musculotendinous retraction when compared to the opposite leg.             Left lower extremity Doppler ultrasound completed 9/9/2019:      Summary          No evidence of deep vein or superficial vein thrombosis involving the left     lower extremity and the right common femoral vein.     The proximal to mid calf veins were not well visualized on the left due to     body habitus.            Left lower extremity diagnostic ultrasound performed by Dr. Irving Houston 9/10/2018:     Limited ultrasound evaluation-left lower extremity  Logically ultrasound  10 MHz     The patient was positioned prone on examination table foot and ankle off the edge.  Linear transducer was utilized in the medial gastrocnemius muscle was evaluated extensively long axis and short axis and its inferior distribution at the distal myotendinous junction.     There were discrete anechoic areas within the muscle at the myotendinous junction consistent with tear/high-grade strain. Gladys Jackman was no considerable retraction evident sonographically.  Only mild gapping was noted at these regions with dynamic stressing.     The more proximal region of the muscle belly was noted to have a normal sonographic appearance as did the lateral gastrocnemius muscle at the distal myotendinous junction.     The Achilles tendon was noted to be intact distal to the myotendinous junction.     There was no evidence of intra-muscular hematoma.     There was tenderness to sono palpation involving the gastrocnemius at the distal myotendinous junction.         Impression:   Diagnosis Orders   1. Strain of gastrocnemius muscle of left lower extremity, subsequent encounter  [facility]       Treatment Plan:    Patient is almost 3 years out from his initial injury. He continues having chronic pain and tightness of the left calf. He had a second opinion with Dr. Patricio Chow who recommended additional physical therapy including Graston Technique and potentially dry needling as well as heel cord stretches. An order will be placed for this. Patient will follow up as directed with Dr. Patricio Chow in 3 months. Rolly Nieto was informed of the results of any imaging. We discussed treatment options and a time was given to answer questions. A plan was proposed and Rolly Nieto understand and accepts this course of care. Electronically signed by Kurtis Real PA-C on 4/6/2665  Board Certified [facility]    Please note that portions of this note were completed with a voice recognition program.  Efforts were made to edit the dictations but occasionally words are mis-transcribed.

## 2021-09-17 ENCOUNTER — OFFICE VISIT (OUTPATIENT)
Dept: PRIMARY CARE CLINIC | Age: 46
End: 2021-09-17
Payer: COMMERCIAL

## 2021-09-17 VITALS
HEIGHT: 68 IN | OXYGEN SATURATION: 94 % | DIASTOLIC BLOOD PRESSURE: 83 MMHG | HEART RATE: 65 BPM | TEMPERATURE: 97.2 F | WEIGHT: 267 LBS | SYSTOLIC BLOOD PRESSURE: 116 MMHG | BODY MASS INDEX: 40.47 KG/M2

## 2021-09-17 DIAGNOSIS — F32.9 REACTIVE DEPRESSION: Primary | ICD-10-CM

## 2021-09-17 DIAGNOSIS — S86.112S STRAIN OF GASTROCNEMIUS MUSCLE OF LEFT LOWER EXTREMITY, SEQUELA: ICD-10-CM

## 2021-09-17 DIAGNOSIS — K21.9 GASTROESOPHAGEAL REFLUX DISEASE, UNSPECIFIED WHETHER ESOPHAGITIS PRESENT: ICD-10-CM

## 2021-09-17 DIAGNOSIS — J30.9 ALLERGIC RHINITIS, UNSPECIFIED SEASONALITY, UNSPECIFIED TRIGGER: ICD-10-CM

## 2021-09-17 DIAGNOSIS — F43.10 PTSD (POST-TRAUMATIC STRESS DISORDER): ICD-10-CM

## 2021-09-17 PROCEDURE — 1036F TOBACCO NON-USER: CPT | Performed by: INTERNAL MEDICINE

## 2021-09-17 PROCEDURE — G8417 CALC BMI ABV UP PARAM F/U: HCPCS | Performed by: INTERNAL MEDICINE

## 2021-09-17 PROCEDURE — 99215 OFFICE O/P EST HI 40 MIN: CPT | Performed by: INTERNAL MEDICINE

## 2021-09-17 PROCEDURE — G8427 DOCREV CUR MEDS BY ELIG CLIN: HCPCS | Performed by: INTERNAL MEDICINE

## 2021-09-17 RX ORDER — MONTELUKAST SODIUM 10 MG/1
10 TABLET ORAL DAILY
Qty: 30 TABLET | Refills: 3 | Status: SHIPPED | OUTPATIENT
Start: 2021-09-17 | End: 2021-10-11

## 2021-09-17 RX ORDER — VENLAFAXINE HYDROCHLORIDE 150 MG/1
150 CAPSULE, EXTENDED RELEASE ORAL DAILY
Qty: 30 CAPSULE | Refills: 3 | Status: SHIPPED | OUTPATIENT
Start: 2021-09-17 | End: 2021-09-22

## 2021-09-17 SDOH — ECONOMIC STABILITY: FOOD INSECURITY: WITHIN THE PAST 12 MONTHS, THE FOOD YOU BOUGHT JUST DIDN'T LAST AND YOU DIDN'T HAVE MONEY TO GET MORE.: NEVER TRUE

## 2021-09-17 SDOH — ECONOMIC STABILITY: FOOD INSECURITY: WITHIN THE PAST 12 MONTHS, YOU WORRIED THAT YOUR FOOD WOULD RUN OUT BEFORE YOU GOT MONEY TO BUY MORE.: NEVER TRUE

## 2021-09-17 ASSESSMENT — ENCOUNTER SYMPTOMS
PHOTOPHOBIA: 0
EYE PAIN: 0
GASTROINTESTINAL NEGATIVE: 1
EYE REDNESS: 0
EYE ITCHING: 0
EYE DISCHARGE: 0
RESPIRATORY NEGATIVE: 1

## 2021-09-17 ASSESSMENT — SOCIAL DETERMINANTS OF HEALTH (SDOH): HOW HARD IS IT FOR YOU TO PAY FOR THE VERY BASICS LIKE FOOD, HOUSING, MEDICAL CARE, AND HEATING?: NOT VERY HARD

## 2021-09-17 NOTE — PROGRESS NOTES
Kalina Cruz (:  1975) is a 40 y.o. male,Established patient, here for evaluation of the following chief complaint(s):  Depression and Foot Swelling (both feet)    Ongoing depression following the death of his father. Also is stressed at work and anxious. He is requesting medication for depression. Started on Effexor 150 mg daily. Also referred to [facility]. The patient's mother is concerned about autism that may not have been diagnosed when he was a child. The consult to psychiatry is also to explore the possibility of the patient is autistic. Please see discussion regarding his lower leg swelling elsewhere in the note. I do not believe he has deep vein thrombosis in either leg. He has trace swelling in both lower legs which I believe is due to venous insufficiency from prolonged sitting and obesity. ASSESSMENT/PLAN:  1. Reactive depression  -     venlafaxine (EFFEXOR XR) 150 MG extended release capsule; Take 1 capsule by mouth daily, Disp-30 capsule, R-3Normal  2. Allergic rhinitis, unspecified seasonality, unspecified trigger  -     montelukast (SINGULAIR) 10 MG tablet; Take 1 tablet by mouth daily, Disp-30 tablet, R-3Normal      No follow-ups on file. Subjective   SUBJECTIVE/OBJECTIVE:  Mental Health Problem  The primary symptoms include somatic symptoms. The current episode started more than 1 month ago. Somatic symptoms do not include fatigue. The degree of incapacity that he is experiencing as a consequence of his illness is moderate. Additional symptoms of the illness do not include appetite change, unexpected weight change or fatigue. He does not have a plan to attempt suicide. He does not contemplate harming himself. He has not already injured self. He does not contemplate injuring another person. He has not already  injured another person.        Review of Systems   Constitutional: Negative for activity change, appetite change, chills, diaphoresis, fatigue, fever and unexpected weight change. HENT: Negative. Eyes: Negative for photophobia, pain, discharge, redness, itching and visual disturbance. Respiratory: Negative. Cardiovascular: Negative. Gastrointestinal: Negative. Genitourinary: Negative. Musculoskeletal: Negative. Skin: Negative. Neurological: Negative. Psychiatric/Behavioral: Negative. Objective   Physical Exam  Constitutional:       Appearance: He is well-developed. HENT:      Head: Normocephalic and atraumatic. Eyes:      Conjunctiva/sclera: Conjunctivae normal.      Pupils: Pupils are equal, round, and reactive to light. Cardiovascular:      Rate and Rhythm: Normal rate and regular rhythm. Heart sounds: Normal heart sounds. Pulmonary:      Effort: Pulmonary effort is normal.      Breath sounds: Normal breath sounds. Musculoskeletal:         General: Normal range of motion. Cervical back: Normal range of motion and neck supple. Skin:     General: Skin is warm and dry. Neurological:      Mental Status: He is alert and oriented to person, place, and time. Psychiatric:         Behavior: Behavior normal.         Thought Content: Thought content normal.            No problem-specific Assessment & Plan notes found for this encounter. On this date 9/17/2021 I have spent 45 minutes reviewing previous notes, test results and face to face with the patient discussing the diagnosis and importance of compliance with the treatment plan as well as documenting on the day of the visit. An electronic signature was used to authenticate this note.     --Marjan Carey MD

## 2021-09-17 NOTE — PATIENT INSTRUCTIONS
Please follow-up with Ann Marie Torres psychiatric CNP for counseling regarding depression and possible autism. Started on Effexor for depression as directed. Singulair is recommended for allergies and will make you drowsy. Please take 1 pill each day. Get at least 30 minutes of aerobic exercise (walking) each day to help with lower extremity swelling and your back pain after prolonged seating at a desk. Follow-up with Dr. Josie Camacho in 1 month.

## 2021-09-22 ENCOUNTER — TELEPHONE (OUTPATIENT)
Dept: PRIMARY CARE CLINIC | Age: 46
End: 2021-09-22

## 2021-09-22 RX ORDER — VENLAFAXINE HYDROCHLORIDE 75 MG/1
75 CAPSULE, EXTENDED RELEASE ORAL DAILY
Qty: 30 CAPSULE | Refills: 3 | Status: SHIPPED | OUTPATIENT
Start: 2021-09-22 | End: 2021-12-27 | Stop reason: SDUPTHER

## 2021-09-22 NOTE — TELEPHONE ENCOUNTER
Patient would like to know if there is a lower dose of the Venlafaxine states it makes him feel drowsy and he has a lost of appetite, please advise.

## 2021-09-22 NOTE — TELEPHONE ENCOUNTER
Inform patient prescription for Effexor 75 mg sent. Also, we put in referral to sleep medicine months ago, not sure that he is been ruled out for sleep apnea yet.   Remind him

## 2021-09-27 ENCOUNTER — TELEPHONE (OUTPATIENT)
Dept: ORTHOPEDIC SURGERY | Age: 46
End: 2021-09-27

## 2021-10-09 DIAGNOSIS — J30.9 ALLERGIC RHINITIS, UNSPECIFIED SEASONALITY, UNSPECIFIED TRIGGER: ICD-10-CM

## 2021-10-09 DIAGNOSIS — F32.9 REACTIVE DEPRESSION: ICD-10-CM

## 2021-10-11 RX ORDER — MONTELUKAST SODIUM 10 MG/1
TABLET ORAL
Qty: 30 TABLET | Refills: 3 | Status: SHIPPED | OUTPATIENT
Start: 2021-10-11 | End: 2022-01-20

## 2021-10-11 RX ORDER — VENLAFAXINE HYDROCHLORIDE 150 MG/1
CAPSULE, EXTENDED RELEASE ORAL
Qty: 30 CAPSULE | Refills: 3 | Status: SHIPPED | OUTPATIENT
Start: 2021-10-11 | End: 2021-12-27

## 2021-12-27 ENCOUNTER — OFFICE VISIT (OUTPATIENT)
Dept: PRIMARY CARE CLINIC | Age: 46
End: 2021-12-27
Payer: COMMERCIAL

## 2021-12-27 VITALS
BODY MASS INDEX: 38.8 KG/M2 | HEART RATE: 90 BPM | WEIGHT: 256 LBS | DIASTOLIC BLOOD PRESSURE: 77 MMHG | SYSTOLIC BLOOD PRESSURE: 119 MMHG | TEMPERATURE: 97.5 F | OXYGEN SATURATION: 92 % | HEIGHT: 68 IN

## 2021-12-27 DIAGNOSIS — Z00.00 ROUTINE ADULT HEALTH MAINTENANCE: ICD-10-CM

## 2021-12-27 DIAGNOSIS — Z23 NEED FOR DIPHTHERIA-TETANUS-PERTUSSIS (TDAP) VACCINE: ICD-10-CM

## 2021-12-27 DIAGNOSIS — G47.33 OSA (OBSTRUCTIVE SLEEP APNEA): Primary | ICD-10-CM

## 2021-12-27 DIAGNOSIS — F32.A MILD DEPRESSION: ICD-10-CM

## 2021-12-27 DIAGNOSIS — J45.41 MODERATE PERSISTENT ASTHMA WITH ACUTE EXACERBATION: ICD-10-CM

## 2021-12-27 PROCEDURE — 1036F TOBACCO NON-USER: CPT | Performed by: INTERNAL MEDICINE

## 2021-12-27 PROCEDURE — G8484 FLU IMMUNIZE NO ADMIN: HCPCS | Performed by: INTERNAL MEDICINE

## 2021-12-27 PROCEDURE — G8417 CALC BMI ABV UP PARAM F/U: HCPCS | Performed by: INTERNAL MEDICINE

## 2021-12-27 PROCEDURE — 99213 OFFICE O/P EST LOW 20 MIN: CPT | Performed by: INTERNAL MEDICINE

## 2021-12-27 PROCEDURE — G8427 DOCREV CUR MEDS BY ELIG CLIN: HCPCS | Performed by: INTERNAL MEDICINE

## 2021-12-27 RX ORDER — BUDESONIDE AND FORMOTEROL FUMARATE DIHYDRATE 160; 4.5 UG/1; UG/1
2 AEROSOL RESPIRATORY (INHALATION) 2 TIMES DAILY
Qty: 1 EACH | Refills: 5 | Status: SHIPPED | OUTPATIENT
Start: 2021-12-27

## 2021-12-27 RX ORDER — SUCRALFATE 1 G/1
1 TABLET ORAL 3 TIMES DAILY PRN
Qty: 150 TABLET | Refills: 1 | Status: SHIPPED | OUTPATIENT
Start: 2021-12-27

## 2021-12-27 RX ORDER — VENLAFAXINE HYDROCHLORIDE 75 MG/1
75 CAPSULE, EXTENDED RELEASE ORAL DAILY
Qty: 30 CAPSULE | Refills: 3 | Status: SHIPPED | OUTPATIENT
Start: 2021-12-27 | End: 2022-06-07 | Stop reason: SDUPTHER

## 2021-12-27 RX ORDER — ALBUTEROL SULFATE 90 UG/1
2 AEROSOL, METERED RESPIRATORY (INHALATION) EVERY 6 HOURS PRN
Qty: 1 EACH | Refills: 3 | Status: SHIPPED | OUTPATIENT
Start: 2021-12-27
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
## 2021-12-27 NOTE — PATIENT INSTRUCTIONS
1. Continue effexor 75mg daily for another 3 months, consider dose reduction at that time if Psych in agreement. 2. Referral to Sleep Medicine, this time in [place], made. Call to make appt. 3. DO THE HEALTH MAINTENANCE BLOOD WORK ORDERED IN MAY 2021, fasting, at [id number] [facility].  [place]

## 2022-01-20 ENCOUNTER — OFFICE VISIT (OUTPATIENT)
Dept: INTERNAL MEDICINE CLINIC | Age: 47
End: 2022-01-20
Payer: COMMERCIAL

## 2022-01-20 ENCOUNTER — HOSPITAL ENCOUNTER (OUTPATIENT)
Age: 47
Discharge: HOME | End: 2022-01-20
Payer: COMMERCIAL

## 2022-01-20 VITALS
OXYGEN SATURATION: 96 % | SYSTOLIC BLOOD PRESSURE: 120 MMHG | BODY MASS INDEX: 39.28 KG/M2 | HEIGHT: 68 IN | HEART RATE: 76 BPM | WEIGHT: 259.2 LBS | DIASTOLIC BLOOD PRESSURE: 80 MMHG

## 2022-01-20 DIAGNOSIS — Z13.220 SCREENING FOR CHOLESTEROL LEVEL: ICD-10-CM

## 2022-01-20 DIAGNOSIS — J45.30 MILD PERSISTENT ASTHMA WITHOUT COMPLICATION: ICD-10-CM

## 2022-01-20 DIAGNOSIS — Z12.5 SPECIAL SCREENING, PROSTATE CANCER: ICD-10-CM

## 2022-01-20 DIAGNOSIS — Z00.00 ROUTINE ADULT HEALTH MAINTENANCE: ICD-10-CM

## 2022-01-20 DIAGNOSIS — Z00.00 PREVENTATIVE HEALTH CARE: Primary | ICD-10-CM

## 2022-01-20 DIAGNOSIS — Z12.11 COLON CANCER SCREENING: ICD-10-CM

## 2022-01-20 DIAGNOSIS — Z00.00 PREVENTATIVE HEALTH CARE: ICD-10-CM

## 2022-01-20 DIAGNOSIS — E66.9 CLASS 2 OBESITY WITH BODY MASS INDEX (BMI) OF 39.0 TO 39.9 IN ADULT, UNSPECIFIED OBESITY TYPE, UNSPECIFIED WHETHER SERIOUS COMORBIDITY PRESENT: ICD-10-CM

## 2022-01-20 DIAGNOSIS — R55 SYNCOPE, UNSPECIFIED SYNCOPE TYPE: ICD-10-CM

## 2022-01-20 PROBLEM — S52.124A CLOSED NONDISPLACED FRACTURE OF HEAD OF RIGHT RADIUS: Status: RESOLVED | Noted: 2021-04-19 | Resolved: 2022-01-20

## 2022-01-20 PROBLEM — R05.3 CHRONIC COUGH: Status: RESOLVED | Noted: 2019-03-04 | Resolved: 2022-01-20

## 2022-01-20 PROBLEM — S86.112A STRAIN OF GASTROCNEMIUS MUSCLE OF LEFT LOWER EXTREMITY: Status: RESOLVED | Noted: 2021-01-06 | Resolved: 2022-01-20

## 2022-01-20 LAB
A/G RATIO: 1.3 (ref 1.1–2.2)
ALBUMIN SERPL-MCNC: 4.7 G/DL (ref 3.4–5)
ALP BLD-CCNC: 69 U/L (ref 40–129)
ALT SERPL-CCNC: 28 U/L (ref 10–40)
ALT SERPL-CCNC: 29 U/L (ref 10–40)
ANION GAP SERPL CALCULATED.3IONS-SCNC: 15 MMOL/L (ref 3–16)
AST SERPL-CCNC: 78 U/L (ref 15–37)
AST SERPL-CCNC: 79 U/L (ref 15–37)
BASOPHILS ABSOLUTE: 0.1 K/UL (ref 0–0.2)
BASOPHILS ABSOLUTE: 0.1 K/UL (ref 0–0.2)
BASOPHILS RELATIVE PERCENT: 0.8 %
BASOPHILS RELATIVE PERCENT: 1.2 %
BILIRUB SERPL-MCNC: 0.4 MG/DL (ref 0–1)
BUN BLDV-MCNC: 15 MG/DL (ref 7–20)
CALCIUM SERPL-MCNC: 9.4 MG/DL (ref 8.3–10.6)
CHLORIDE BLD-SCNC: 102 MMOL/L (ref 99–110)
CHOLESTEROL, TOTAL: 286 MG/DL (ref 0–199)
CHOLESTEROL, TOTAL: 299 MG/DL (ref 0–199)
CO2: 23 MMOL/L (ref 21–32)
CREAT SERPL-MCNC: 1.2 MG/DL (ref 0.9–1.3)
EOSINOPHILS ABSOLUTE: 0.4 K/UL (ref 0–0.6)
EOSINOPHILS ABSOLUTE: 0.4 K/UL (ref 0–0.6)
EOSINOPHILS RELATIVE PERCENT: 5 %
EOSINOPHILS RELATIVE PERCENT: 5.4 %
GFR AFRICAN AMERICAN: >60
GFR NON-AFRICAN AMERICAN: >60
GLUCOSE BLD-MCNC: 80 MG/DL (ref 70–99)
HCT VFR BLD CALC: 41.3 % (ref 40.5–52.5)
HCT VFR BLD CALC: 41.7 % (ref 40.5–52.5)
HDLC SERPL-MCNC: 39 MG/DL (ref 40–60)
HDLC SERPL-MCNC: 41 MG/DL (ref 40–60)
HEMOGLOBIN: 13.6 G/DL (ref 13.5–17.5)
HEMOGLOBIN: 13.6 G/DL (ref 13.5–17.5)
LDL CHOLESTEROL CALCULATED: 224 MG/DL
LDL CHOLESTEROL CALCULATED: 231 MG/DL
LYMPHOCYTES ABSOLUTE: 1.9 K/UL (ref 1–5.1)
LYMPHOCYTES ABSOLUTE: 2 K/UL (ref 1–5.1)
LYMPHOCYTES RELATIVE PERCENT: 22.8 %
LYMPHOCYTES RELATIVE PERCENT: 25.2 %
MCH RBC QN AUTO: 30.2 PG (ref 26–34)
MCH RBC QN AUTO: 30.7 PG (ref 26–34)
MCHC RBC AUTO-ENTMCNC: 32.7 G/DL (ref 31–36)
MCHC RBC AUTO-ENTMCNC: 32.9 G/DL (ref 31–36)
MCV RBC AUTO: 92.4 FL (ref 80–100)
MCV RBC AUTO: 93.3 FL (ref 80–100)
MONOCYTES ABSOLUTE: 0.3 K/UL (ref 0–1.3)
MONOCYTES ABSOLUTE: 0.5 K/UL (ref 0–1.3)
MONOCYTES RELATIVE PERCENT: 4.6 %
MONOCYTES RELATIVE PERCENT: 5.8 %
NEUTROPHILS ABSOLUTE: 4.8 K/UL (ref 1.7–7.7)
NEUTROPHILS ABSOLUTE: 5.7 K/UL (ref 1.7–7.7)
NEUTROPHILS RELATIVE PERCENT: 63.6 %
NEUTROPHILS RELATIVE PERCENT: 65.6 %
PDW BLD-RTO: 15.2 % (ref 12.4–15.4)
PDW BLD-RTO: 15.2 % (ref 12.4–15.4)
PLATELET # BLD: 256 K/UL (ref 135–450)
PLATELET # BLD: 289 K/UL (ref 135–450)
PMV BLD AUTO: 8.8 FL (ref 5–10.5)
PMV BLD AUTO: 9 FL (ref 5–10.5)
POTASSIUM SERPL-SCNC: 3.7 MMOL/L (ref 3.5–5.1)
PROSTATE SPECIFIC ANTIGEN: 0.38 NG/ML (ref 0–4)
RBC # BLD: 4.43 M/UL (ref 4.2–5.9)
RBC # BLD: 4.51 M/UL (ref 4.2–5.9)
SEDIMENTATION RATE, ERYTHROCYTE: 14 MM/HR (ref 0–15)
SODIUM BLD-SCNC: 140 MMOL/L (ref 136–145)
T4 FREE: <0.1 NG/DL (ref 0.9–1.8)
TOTAL PROTEIN: 8.2 G/DL (ref 6.4–8.2)
TRIGL SERPL-MCNC: 117 MG/DL (ref 0–150)
TRIGL SERPL-MCNC: 135 MG/DL (ref 0–150)
TSH REFLEX: 317.5 UIU/ML (ref 0.27–4.2)
TSH SERPL DL<=0.05 MIU/L-ACNC: 311.6 UIU/ML (ref 0.27–4.2)
VITAMIN B-12: 398 PG/ML (ref 211–911)
VITAMIN D 25-HYDROXY: 14 NG/ML
VITAMIN D 25-HYDROXY: 8.9 NG/ML
VLDLC SERPL CALC-MCNC: 23 MG/DL
VLDLC SERPL CALC-MCNC: 27 MG/DL
WBC # BLD: 7.5 K/UL (ref 4–11)
WBC # BLD: 8.6 K/UL (ref 4–11)

## 2022-01-20 PROCEDURE — 82306 VITAMIN D 25 HYDROXY: CPT

## 2022-01-20 PROCEDURE — 86701 HIV-1ANTIBODY: CPT

## 2022-01-20 PROCEDURE — 80061 LIPID PANEL: CPT

## 2022-01-20 PROCEDURE — 84153 ASSAY OF PSA TOTAL: CPT

## 2022-01-20 PROCEDURE — 86702 HIV-2 ANTIBODY: CPT

## 2022-01-20 PROCEDURE — 86803 HEPATITIS C AB TEST: CPT

## 2022-01-20 PROCEDURE — 84460 ALANINE AMINO (ALT) (SGPT): CPT

## 2022-01-20 PROCEDURE — 84439 ASSAY OF FREE THYROXINE: CPT

## 2022-01-20 PROCEDURE — 82607 VITAMIN B-12: CPT

## 2022-01-20 PROCEDURE — 99396 PREV VISIT EST AGE 40-64: CPT | Performed by: INTERNAL MEDICINE

## 2022-01-20 PROCEDURE — 85652 RBC SED RATE AUTOMATED: CPT

## 2022-01-20 PROCEDURE — 84154 ASSAY OF PSA FREE: CPT

## 2022-01-20 PROCEDURE — 84443 ASSAY THYROID STIM HORMONE: CPT

## 2022-01-20 PROCEDURE — 87390 HIV-1 AG IA: CPT

## 2022-01-20 PROCEDURE — 84450 TRANSFERASE (AST) (SGOT): CPT

## 2022-01-20 PROCEDURE — 85025 COMPLETE CBC W/AUTO DIFF WBC: CPT

## 2022-01-20 PROCEDURE — 36415 COLL VENOUS BLD VENIPUNCTURE: CPT

## 2022-01-20 PROCEDURE — G8484 FLU IMMUNIZE NO ADMIN: HCPCS | Performed by: INTERNAL MEDICINE

## 2022-01-20 ASSESSMENT — ENCOUNTER SYMPTOMS
SINUS PAIN: 0
CONSTIPATION: 0
WHEEZING: 0
SHORTNESS OF BREATH: 0
COUGH: 0
BLOOD IN STOOL: 0
ABDOMINAL PAIN: 0
CHEST TIGHTNESS: 0
COLOR CHANGE: 0

## 2022-01-20 NOTE — PROGRESS NOTES
Campos Gaffney (:  1975) is a 40 y.o. male,New patient, here for evaluation of the following chief complaint(s):  Established New Doctor         ASSESSMENT/PLAN:  1. Preventative health care  -     CBC Auto Differential; Future  -     Comprehensive Metabolic Panel; Future  -     TSH without Reflex; Future  -     VITAMIN D 25 HYDROXY; Future  -     Hemoglobin A1C; Future  2. Mild persistent asthma without complication  3. Screening for cholesterol level  -     Lipid Panel; Future  4. Special screening, prostate cancer  -     PSA, Prostatic Specific Antigen; Future  5. Colon cancer screening  -     [facility] - Sav Dominguez MD, Gastroenterology, [facility]  Reviewed patient his current medical condition we can refer him to have his colonoscopy check his metabolic profile    Patient asthma is under very good control we will continue to monitor clinically for now    Return in about 6 months (around 2022).          Subjective   SUBJECTIVE/OBJECTIVE:    Lab Review   Lab Results   Component Value Date     2021     2019     2019    K 4.1 2021    K 3.7 2019    K 3.7 2019    K 3.5 2017    CO2 23 2021    CO2 24 2019    CO2 24 2019    BUN 16 2021    BUN 12 2019    BUN 16 2019    CREATININE 1.3 2021    CREATININE 1.1 2019    CREATININE 1.0 2019    GLUCOSE 97 2021    GLUCOSE 89 2019    GLUCOSE 98 2019    CALCIUM 9.2 2021    CALCIUM 9.2 2019    CALCIUM 9.3 2019     Lab Results   Component Value Date    WBC 8.9 2019    WBC 9.3 2019    WBC 14.3 2017    HGB 14.0 2019    HGB 14.5 2019    HGB 14.7 2017    HCT 41.9 2019    HCT 44.0 2019    HCT 44.2 2017    MCV 93.1 2019    MCV 94.5 2019    MCV 93.1 2017     2019     2019     2017     Lab Results   Component Value Date    CHOL 218 07/15/2015    TRIG 300 07/15/2015    HDL 28 07/15/2015       Vitals 1/20/2022 12/27/2021 9/34/0778   SYSTOLIC 579 588 832   DIASTOLIC 80 77 83   Position - - -   Pulse 76 90 65   Temp - 97.5 97.2   Resp - - -   SpO2 96 92 94   Weight 259 lb 3.2 oz 256 lb 267 lb   Height 5' 8\" 5' 8\" 5' 8\"   Body mass index 39.41 kg/m2 38.92 kg/m2 40.59 kg/m2   Pain Level - - -   Some recent data might be hidden       Patient is here to establish himself and for an annual physical      Review of Systems   Constitutional: Negative for activity change, appetite change, fatigue and unexpected weight change. HENT: Negative for congestion, ear pain and sinus pain. Respiratory: Negative for cough, chest tightness, shortness of breath and wheezing. Cardiovascular: Negative for chest pain and palpitations. Gastrointestinal: Negative for abdominal pain, blood in stool and constipation. Endocrine: Negative for cold intolerance, heat intolerance and polyuria. Genitourinary: Negative for dysuria, frequency and urgency. Musculoskeletal: Negative for arthralgias and myalgias. Skin: Negative for color change and rash. Neurological: Negative for weakness and headaches. Hematological: Negative for adenopathy. Does not bruise/bleed easily. Psychiatric/Behavioral: Negative for agitation, dysphoric mood and sleep disturbance. Objective   Physical Exam  Vitals and nursing note reviewed. Constitutional:       General: He is not in acute distress. Appearance: Normal appearance. HENT:      Head: Normocephalic and atraumatic. Right Ear: Tympanic membrane normal.      Left Ear: Tympanic membrane normal.      Nose: Nose normal.   Eyes:      Extraocular Movements: Extraocular movements intact. Conjunctiva/sclera: Conjunctivae normal.      Pupils: Pupils are equal, round, and reactive to light. Neck:      Vascular: No carotid bruit.    Cardiovascular:      Rate and Rhythm: Normal rate and regular rhythm. Pulses: Normal pulses. Heart sounds: No murmur heard. Pulmonary:      Effort: Pulmonary effort is normal. No respiratory distress. Breath sounds: Normal breath sounds. Abdominal:      General: Abdomen is flat. Bowel sounds are normal. There is no distension. Palpations: Abdomen is soft. Tenderness: There is no abdominal tenderness. Musculoskeletal:         General: No swelling, tenderness or deformity. Cervical back: Normal range of motion and neck supple. No rigidity or tenderness. Right lower leg: No edema. Left lower leg: No edema. Lymphadenopathy:      Cervical: No cervical adenopathy. Skin:     Coloration: Skin is not jaundiced. Findings: No bruising, erythema or lesion. Neurological:      General: No focal deficit present. Mental Status: He is alert and oriented to person, place, and time. Cranial Nerves: No cranial nerve deficit. Motor: No weakness. Gait: Gait normal.            This dictation was generated by voice recognition computer software. Although all attempts are made to edit the dictation for accuracy, there may be errors in the transcription that are not intended. An electronic signature was used to authenticate this note.     --Irvin Luna MD

## 2022-01-21 DIAGNOSIS — E55.9 VITAMIN D DEFICIENCY: ICD-10-CM

## 2022-01-21 DIAGNOSIS — E03.4 HYPOTHYROIDISM DUE TO ACQUIRED ATROPHY OF THYROID: Primary | ICD-10-CM

## 2022-01-21 LAB
ESTIMATED AVERAGE GLUCOSE: 131.2 MG/DL
HBA1C MFR BLD: 6.2 %
HIV AG/AB: NORMAL
HIV ANTIGEN: NORMAL
HIV-1 ANTIBODY: NORMAL
HIV-2 AB: NORMAL

## 2022-01-21 RX ORDER — LEVOTHYROXINE SODIUM 0.07 MG/1
75 TABLET ORAL DAILY
Qty: 90 TABLET | Refills: 1 | Status: SHIPPED | OUTPATIENT
Start: 2022-01-21 | End: 2022-02-18

## 2022-01-21 RX ORDER — ERGOCALCIFEROL 1.25 MG/1
50000 CAPSULE ORAL WEEKLY
Qty: 12 CAPSULE | Refills: 1 | Status: SHIPPED | OUTPATIENT
Start: 2022-01-21 | End: 2022-07-05

## 2022-01-22 LAB
HEPATITIS C VIRUS AB BY CIA INDEX: 0.07 IV
HEPATITIS C VIRUS AB BY CIA INTERPRETATION: NEGATIVE

## 2022-01-24 DIAGNOSIS — E03.4 HYPOTHYROIDISM DUE TO ACQUIRED ATROPHY OF THYROID: Primary | ICD-10-CM

## 2022-01-24 DIAGNOSIS — E03.4 HYPOTHYROIDISM DUE TO ACQUIRED ATROPHY OF THYROID: ICD-10-CM

## 2022-01-24 LAB — CORTISOL TOTAL: 7.7 UG/DL

## 2022-01-25 ENCOUNTER — TELEPHONE (OUTPATIENT)
Dept: INTERNAL MEDICINE CLINIC | Age: 47
End: 2022-01-25

## 2022-01-25 NOTE — TELEPHONE ENCOUNTER
Pt calling saw his blood work results on my chart but has some questions about them---please call the pt. Thanks.

## 2022-01-31 LAB
PROSTATE SPECIFIC ANTIGEN FREE: 0.1 UG/L
PROSTATE SPECIFIC ANTIGEN PERCENT FREE: 50 %
PROSTATE SPECIFIC ANTIGEN: 0.2 UG/L (ref 0–4)

## 2022-02-16 ENCOUNTER — OFFICE VISIT (OUTPATIENT)
Dept: PULMONOLOGY | Age: 47
End: 2022-02-16
Payer: MEDICARE

## 2022-02-16 VITALS
HEART RATE: 110 BPM | OXYGEN SATURATION: 96 % | HEIGHT: 68 IN | SYSTOLIC BLOOD PRESSURE: 122 MMHG | DIASTOLIC BLOOD PRESSURE: 84 MMHG | BODY MASS INDEX: 37.44 KG/M2 | WEIGHT: 247 LBS

## 2022-02-16 DIAGNOSIS — E66.01 CLASS 2 SEVERE OBESITY DUE TO EXCESS CALORIES WITH SERIOUS COMORBIDITY AND BODY MASS INDEX (BMI) OF 39.0 TO 39.9 IN ADULT (HCC): ICD-10-CM

## 2022-02-16 DIAGNOSIS — G47.33 OSA (OBSTRUCTIVE SLEEP APNEA): Primary | ICD-10-CM

## 2022-02-16 DIAGNOSIS — J45.30 MILD PERSISTENT ASTHMA WITHOUT COMPLICATION: ICD-10-CM

## 2022-02-16 PROCEDURE — G8428 CUR MEDS NOT DOCUMENT: HCPCS | Performed by: INTERNAL MEDICINE

## 2022-02-16 PROCEDURE — 1036F TOBACCO NON-USER: CPT | Performed by: INTERNAL MEDICINE

## 2022-02-16 PROCEDURE — G8484 FLU IMMUNIZE NO ADMIN: HCPCS | Performed by: INTERNAL MEDICINE

## 2022-02-16 PROCEDURE — 99204 OFFICE O/P NEW MOD 45 MIN: CPT | Performed by: INTERNAL MEDICINE

## 2022-02-16 PROCEDURE — G8417 CALC BMI ABV UP PARAM F/U: HCPCS | Performed by: INTERNAL MEDICINE

## 2022-02-16 ASSESSMENT — SLEEP AND FATIGUE QUESTIONNAIRES
HOW LIKELY ARE YOU TO NOD OFF OR FALL ASLEEP WHILE SITTING AND READING: 3
HOW LIKELY ARE YOU TO NOD OFF OR FALL ASLEEP WHEN YOU ARE A PASSENGER IN A CAR FOR AN HOUR WITHOUT A BREAK: 2
HOW LIKELY ARE YOU TO NOD OFF OR FALL ASLEEP IN A CAR, WHILE STOPPED FOR A FEW MINUTES IN TRAFFIC: 1
HOW LIKELY ARE YOU TO NOD OFF OR FALL ASLEEP WHILE SITTING INACTIVE IN A PUBLIC PLACE: 1
HOW LIKELY ARE YOU TO NOD OFF OR FALL ASLEEP WHILE WATCHING TV: 2
HOW LIKELY ARE YOU TO NOD OFF OR FALL ASLEEP WHILE SITTING AND TALKING TO SOMEONE: 1
HOW LIKELY ARE YOU TO NOD OFF OR FALL ASLEEP WHILE SITTING QUIETLY AFTER LUNCH WITHOUT ALCOHOL: 3
HOW LIKELY ARE YOU TO NOD OFF OR FALL ASLEEP WHILE LYING DOWN TO REST IN THE AFTERNOON WHEN CIRCUMSTANCES PERMIT: 3
ESS TOTAL SCORE: 16
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
## 2022-02-17 ENCOUNTER — OFFICE VISIT (OUTPATIENT)
Dept: INTERNAL MEDICINE CLINIC | Age: 47
End: 2022-02-17
Payer: COMMERCIAL

## 2022-02-17 ENCOUNTER — TELEPHONE (OUTPATIENT)
Dept: SLEEP CENTER | Age: 47
End: 2022-02-17

## 2022-02-17 VITALS
HEIGHT: 68 IN | SYSTOLIC BLOOD PRESSURE: 122 MMHG | DIASTOLIC BLOOD PRESSURE: 78 MMHG | BODY MASS INDEX: 37.89 KG/M2 | WEIGHT: 250 LBS | HEART RATE: 88 BPM

## 2022-02-17 DIAGNOSIS — Z12.11 COLON CANCER SCREENING: Primary | ICD-10-CM

## 2022-02-17 DIAGNOSIS — E78.2 MIXED HYPERLIPIDEMIA: ICD-10-CM

## 2022-02-17 DIAGNOSIS — E03.9 ACQUIRED HYPOTHYROIDISM: ICD-10-CM

## 2022-02-17 DIAGNOSIS — R73.03 PREDIABETES: ICD-10-CM

## 2022-02-17 LAB — TSH SERPL DL<=0.05 MIU/L-ACNC: 53.56 UIU/ML (ref 0.27–4.2)

## 2022-02-17 PROCEDURE — G8484 FLU IMMUNIZE NO ADMIN: HCPCS | Performed by: INTERNAL MEDICINE

## 2022-02-17 PROCEDURE — G8417 CALC BMI ABV UP PARAM F/U: HCPCS | Performed by: INTERNAL MEDICINE

## 2022-02-17 PROCEDURE — 1036F TOBACCO NON-USER: CPT | Performed by: INTERNAL MEDICINE

## 2022-02-17 PROCEDURE — G8427 DOCREV CUR MEDS BY ELIG CLIN: HCPCS | Performed by: INTERNAL MEDICINE

## 2022-02-17 PROCEDURE — 99214 OFFICE O/P EST MOD 30 MIN: CPT | Performed by: INTERNAL MEDICINE

## 2022-02-17 NOTE — ASSESSMENT & PLAN NOTE
Reviewed with patient his progress he has slightly more energy he did lose some weight he did not have any side effects or issues with it we can recheck his TSH today and decide on the next dose to go up to

## 2022-02-17 NOTE — ASSESSMENT & PLAN NOTE
Patient cholesterol is elevated we discussed with him the need for better eating habits and exercise that he should be engaged and but at this time again to wait for another couple months before we recheck it given that his thyroid is significantly underactive

## 2022-02-17 NOTE — PROGRESS NOTES
Fartun Dodge (:  1975) is a 40 y.o. male,New patient, here for evaluation of the following chief complaint(s):  Follow-up (discuss labs)         ASSESSMENT/PLAN:  1. Colon cancer screening  -     [facility] - Jose Guadalupe Ortega MD, Gastroenterology, [facility]  2. Acquired hypothyroidism  Assessment & Plan:  Reviewed with patient his progress he has slightly more energy he did lose some weight he did not have any side effects or issues with it we can recheck his TSH today and decide on the next dose to go up to  Orders:  -     TSH; Future  3. Mixed hyperlipidemia  Assessment & Plan:  Patient cholesterol is elevated we discussed with him the need for better eating habits and exercise that he should be engaged and but at this time again to wait for another couple months before we recheck it given that his thyroid is significantly underactive  4. Prediabetes  Assessment & Plan:  Encourage the patient to work on lifestyle modification including eating habits change as well as exercise regularly and will get a reassess in about 3 to 6 months      Return in about 8 weeks (around 2022).          Subjective   SUBJECTIVE/OBJECTIVE:    Lab Review   Lab Results   Component Value Date     2022     2021     2019    K 3.7 2022    K 4.1 2021    K 3.7 2019    K 3.7 2019    CO2 23 2022    CO2 23 2021    CO2 24 2019    BUN 15 2022    BUN 16 2021    BUN 12 2019    CREATININE 1.2 2022    CREATININE 1.3 2021    CREATININE 1.1 2019    GLUCOSE 80 2022    GLUCOSE 97 2021    GLUCOSE 89 2019    CALCIUM 9.4 2022    CALCIUM 9.2 2021    CALCIUM 9.2 2019     Lab Results   Component Value Date    WBC 8.6 2022    WBC 7.5 2022    WBC 8.9 2019    HGB 13.6 2022    HGB 13.6 2022    HGB 14.0 2019    HCT 41.3 2022    HCT 41.7 2022    HCT 41.9 09/08/2019    MCV 93.3 01/20/2022    MCV 92.4 01/20/2022    MCV 93.1 09/08/2019     01/20/2022     01/20/2022     09/08/2019     Lab Results   Component Value Date    CHOL 286 01/20/2022    CHOL 299 01/20/2022    CHOL 218 07/15/2015    TRIG 117 01/20/2022    TRIG 135 01/20/2022    TRIG 300 07/15/2015    HDL 39 01/20/2022    HDL 41 01/20/2022    HDL 28 07/15/2015       Vitals 2/17/2022 2/16/2022 3/99/7149   SYSTOLIC 867 990 875   DIASTOLIC 78 84 80   Site - Left Upper Arm -   Position - Sitting -   Cuff Size - Large Adult -   Pulse 88 110 76   Temp - - -   Resp - - -   SpO2 - 96 96   Weight 250 lb 247 lb 259 lb 3.2 oz   Height 5' 8\" 5' 8\" 5' 8\"   Body mass index 38.01 kg/m2 37.55 kg/m2 39.41 kg/m2   Pain Level - - -   Some recent data might be hidden       Patient is following up for his severely underactive thyroid he did lose some weight he is taking his medication regularly did not have any side effects    Patient is scheduled to have a sleep study tomorrow      Review of Systems       Objective   Physical Exam  Vitals and nursing note reviewed. Constitutional:       General: He is not in acute distress. Appearance: Normal appearance. HENT:      Head: Normocephalic and atraumatic. Right Ear: Tympanic membrane normal.      Left Ear: Tympanic membrane normal.      Nose: Nose normal.   Eyes:      Extraocular Movements: Extraocular movements intact. Conjunctiva/sclera: Conjunctivae normal.      Pupils: Pupils are equal, round, and reactive to light. Neck:      Vascular: No carotid bruit. Cardiovascular:      Rate and Rhythm: Normal rate and regular rhythm. Pulses: Normal pulses. Heart sounds: No murmur heard. Pulmonary:      Effort: Pulmonary effort is normal. No respiratory distress. Breath sounds: Normal breath sounds. Abdominal:      General: Abdomen is flat. Bowel sounds are normal. There is no distension. Palpations: Abdomen is soft. Tenderness: There is no abdominal tenderness. Musculoskeletal:         General: No swelling, tenderness or deformity. Cervical back: Normal range of motion and neck supple. No rigidity or tenderness. Right lower leg: No edema. Left lower leg: No edema. Lymphadenopathy:      Cervical: No cervical adenopathy. Skin:     Coloration: Skin is not jaundiced. Findings: No bruising, erythema or lesion. Neurological:      General: No focal deficit present. Mental Status: He is alert and oriented to person, place, and time. Cranial Nerves: No cranial nerve deficit. Motor: No weakness. Gait: Gait normal.            This dictation was generated by voice recognition computer software. Although all attempts are made to edit the dictation for accuracy, there may be errors in the transcription that are not intended. An electronic signature was used to authenticate this note.     --Maddison Barone MD

## 2022-02-17 NOTE — ASSESSMENT & PLAN NOTE
Encourage the patient to work on lifestyle modification including eating habits change as well as exercise regularly and will get a reassess in about 3 to 6 months

## 2022-02-17 NOTE — TELEPHONE ENCOUNTER
Called to schedule split night sleep study per Curt Preciado  for the pt to return my call     [employer]   Had shots

## 2022-02-18 RX ORDER — LEVOTHYROXINE SODIUM 0.12 MG/1
125 TABLET ORAL DAILY
Qty: 90 TABLET | Refills: 1 | Status: SHIPPED | OUTPATIENT
Start: 2022-02-18 | End: 2022-06-21

## 2022-03-10 ENCOUNTER — HOSPITAL ENCOUNTER (OUTPATIENT)
Dept: SLEEP CENTER | Age: 47
Discharge: HOME | End: 2022-03-10
Payer: COMMERCIAL

## 2022-03-10 DIAGNOSIS — G47.33 OSA (OBSTRUCTIVE SLEEP APNEA): ICD-10-CM

## 2022-03-10 PROCEDURE — 95811 POLYSOM 6/>YRS CPAP 4/> PARM: CPT

## 2022-03-11 PROCEDURE — 95811 POLYSOM 6/>YRS CPAP 4/> PARM: CPT | Performed by: INTERNAL MEDICINE

## 2022-03-14 ENCOUNTER — TELEPHONE (OUTPATIENT)
Dept: PULMONOLOGY | Age: 47
End: 2022-03-14

## 2022-03-25 ENCOUNTER — OFFICE VISIT (OUTPATIENT)
Dept: INTERNAL MEDICINE CLINIC | Age: 47
End: 2022-03-25
Payer: COMMERCIAL

## 2022-03-25 VITALS
HEIGHT: 68 IN | HEART RATE: 99 BPM | BODY MASS INDEX: 36.59 KG/M2 | WEIGHT: 241.4 LBS | DIASTOLIC BLOOD PRESSURE: 80 MMHG | SYSTOLIC BLOOD PRESSURE: 122 MMHG

## 2022-03-25 DIAGNOSIS — E03.9 ACQUIRED HYPOTHYROIDISM: ICD-10-CM

## 2022-03-25 DIAGNOSIS — K75.9 HEPATITIS: ICD-10-CM

## 2022-03-25 DIAGNOSIS — R07.89 OTHER CHEST PAIN: Primary | ICD-10-CM

## 2022-03-25 DIAGNOSIS — E01.0 THYROMEGALY: ICD-10-CM

## 2022-03-25 DIAGNOSIS — R07.89 OTHER CHEST PAIN: ICD-10-CM

## 2022-03-25 LAB
ANION GAP SERPL CALCULATED.3IONS-SCNC: 7 MMOL/L (ref 3–16)
BASOPHILS ABSOLUTE: 0.1 K/UL (ref 0–0.2)
BASOPHILS RELATIVE PERCENT: 1.1 %
BUN BLDV-MCNC: 12 MG/DL (ref 7–20)
CALCIUM SERPL-MCNC: 9.9 MG/DL (ref 8.3–10.6)
CHLORIDE BLD-SCNC: 103 MMOL/L (ref 99–110)
CO2: 23 MMOL/L (ref 21–32)
CREAT SERPL-MCNC: 1 MG/DL (ref 0.9–1.3)
EOSINOPHILS ABSOLUTE: 0.3 K/UL (ref 0–0.6)
EOSINOPHILS RELATIVE PERCENT: 4.7 %
GFR AFRICAN AMERICAN: >60
GFR NON-AFRICAN AMERICAN: >60
GLUCOSE BLD-MCNC: 93 MG/DL (ref 70–99)
HCT VFR BLD CALC: 43.7 % (ref 40.5–52.5)
HEMOGLOBIN: 14.3 G/DL (ref 13.5–17.5)
LYMPHOCYTES ABSOLUTE: 1.5 K/UL (ref 1–5.1)
LYMPHOCYTES RELATIVE PERCENT: 20.7 %
MCH RBC QN AUTO: 29.5 PG (ref 26–34)
MCHC RBC AUTO-ENTMCNC: 32.8 G/DL (ref 31–36)
MCV RBC AUTO: 90.2 FL (ref 80–100)
MONOCYTES ABSOLUTE: 0.6 K/UL (ref 0–1.3)
MONOCYTES RELATIVE PERCENT: 8.6 %
NEUTROPHILS ABSOLUTE: 4.7 K/UL (ref 1.7–7.7)
NEUTROPHILS RELATIVE PERCENT: 64.9 %
PDW BLD-RTO: 14.1 % (ref 12.4–15.4)
PLATELET # BLD: 374 K/UL (ref 135–450)
PMV BLD AUTO: 8.3 FL (ref 5–10.5)
POTASSIUM SERPL-SCNC: 4.3 MMOL/L (ref 3.5–5.1)
RBC # BLD: 4.85 M/UL (ref 4.2–5.9)
SODIUM BLD-SCNC: 133 MMOL/L (ref 136–145)
TOTAL CK: 301 U/L (ref 39–308)
TROPONIN: <0.01 NG/ML
TSH SERPL DL<=0.05 MIU/L-ACNC: 2.12 UIU/ML (ref 0.27–4.2)
WBC # BLD: 7.2 K/UL (ref 4–11)

## 2022-03-25 PROCEDURE — 99214 OFFICE O/P EST MOD 30 MIN: CPT | Performed by: INTERNAL MEDICINE

## 2022-03-25 PROCEDURE — 93000 ELECTROCARDIOGRAM COMPLETE: CPT | Performed by: INTERNAL MEDICINE

## 2022-03-25 PROCEDURE — G8427 DOCREV CUR MEDS BY ELIG CLIN: HCPCS | Performed by: INTERNAL MEDICINE

## 2022-03-25 PROCEDURE — 1036F TOBACCO NON-USER: CPT | Performed by: INTERNAL MEDICINE

## 2022-03-25 PROCEDURE — G8484 FLU IMMUNIZE NO ADMIN: HCPCS | Performed by: INTERNAL MEDICINE

## 2022-03-25 PROCEDURE — G8417 CALC BMI ABV UP PARAM F/U: HCPCS | Performed by: INTERNAL MEDICINE

## 2022-03-25 ASSESSMENT — ENCOUNTER SYMPTOMS
ABDOMINAL PAIN: 0
SHORTNESS OF BREATH: 0
CONSTIPATION: 0
COLOR CHANGE: 0
WHEEZING: 0
BACK PAIN: 0
BLOOD IN STOOL: 0
VOMITING: 0
CHEST TIGHTNESS: 0
HEMOPTYSIS: 0
ORTHOPNEA: 0
SPUTUM PRODUCTION: 0
COUGH: 0
SINUS PAIN: 0

## 2022-03-25 NOTE — PROGRESS NOTES
Massimo Chang (:  1975) is a 40 y.o. male,New patient, here for evaluation of the following chief complaint(s):  Pharyngitis (sharp pain x weeks agitated it with increase activity)         ASSESSMENT/PLAN:  1. Other chest pain  -     EKG 12 Lead  -     CK; Future  -     Troponin; Future  -     CBC with Auto Differential; Future  -     Basic Metabolic Panel; Future  -     CARDIAC STRESS TEST EXERCISE ONLY; Future  2. Thyromegaly  -     US THYROID; Future  3. Acquired hypothyroidism  -     TSH; Future  The patient has very atypical chest pain in the form of sharp pain that occurred in the upper part of the chest when resting without activity and actually does not happen when he is active not associated with any cardiopulmonary symptoms lasting only for a few minutes and then resolve spontaneously adding to that his clinical finding of a tender thyroid gland    At this point a baseline EKG and blood work will be obtained today if those are normal we will get a go ahead with a stress test for complete risk stratification assessment    Thyroid ultrasound be done to assess the thyroid as well as blood work    Patient symptoms also could be atypical acid reflux disease with him feeling that discomfort if the above are negative referral to ENT for laryngoscopy will be also obtained and start him on proton pump inhibitor at the same time  Addendum    Patient EKG shows sinus rhythm with nonspecific T wave inversion in lead III which is old PVCs also identified no acute ST-T wave changes    Return in about 3 months (around 2022), or if symptoms worsen or fail to improve.          Subjective   SUBJECTIVE/OBJECTIVE:    Lab Review   Lab Results   Component Value Date     2022     2021     2019    K 3.7 2022    K 4.1 2021    K 3.7 2019    K 3.7 2019    CO2 23 2022    CO2 23 2021    CO2 24 2019    BUN 15 2022    BUN 16 2021 BUN 12 09/08/2019    CREATININE 1.2 01/20/2022    CREATININE 1.3 06/07/2021    CREATININE 1.1 09/08/2019    GLUCOSE 80 01/20/2022    GLUCOSE 97 06/07/2021    GLUCOSE 89 09/08/2019    CALCIUM 9.4 01/20/2022    CALCIUM 9.2 06/07/2021    CALCIUM 9.2 09/08/2019     Lab Results   Component Value Date    WBC 8.6 01/20/2022    WBC 7.5 01/20/2022    WBC 8.9 09/08/2019    HGB 13.6 01/20/2022    HGB 13.6 01/20/2022    HGB 14.0 09/08/2019    HCT 41.3 01/20/2022    HCT 41.7 01/20/2022    HCT 41.9 09/08/2019    MCV 93.3 01/20/2022    MCV 92.4 01/20/2022    MCV 93.1 09/08/2019     01/20/2022     01/20/2022     09/08/2019     Lab Results   Component Value Date    CHOL 286 01/20/2022    CHOL 299 01/20/2022    CHOL 218 07/15/2015    TRIG 117 01/20/2022    TRIG 135 01/20/2022    TRIG 300 07/15/2015    HDL 39 01/20/2022    HDL 41 01/20/2022    HDL 28 07/15/2015       Vitals 3/25/2022 2/17/2022 1/00/1512   SYSTOLIC 909 201 742   DIASTOLIC 80 78 84   Site - - Left Upper Arm   Position - - Sitting   Cuff Size - - Large Adult   Pulse 99 88 110   Temp - - -   Resp - - -   SpO2 - - 96   Weight 241 lb 6.4 oz 250 lb 247 lb   Height 5' 8\" 5' 8\" 5' 8\"   Body mass index 36.7 kg/m2 38.01 kg/m2 37.55 kg/m2   Pain Level - - -   Some recent data might be hidden       Chest Pain   This is a new problem. The current episode started 1 to 4 weeks ago. The pain is present in the substernal region. Pertinent negatives include no abdominal pain, back pain, claudication, cough, diaphoresis, dizziness, fever, headaches, hemoptysis, irregular heartbeat, orthopnea, palpitations, shortness of breath, sputum production, syncope, vomiting or weakness. Review of Systems   Constitutional: Negative for activity change, appetite change, diaphoresis, fatigue, fever and unexpected weight change. HENT: Negative for congestion, ear pain and sinus pain.     Respiratory: Negative for cough, hemoptysis, sputum production, chest tightness, shortness of breath and wheezing. Cardiovascular: Positive for chest pain. Negative for palpitations, orthopnea, claudication and syncope. Gastrointestinal: Negative for abdominal pain, blood in stool, constipation and vomiting. Endocrine: Negative for cold intolerance, heat intolerance and polyuria. Genitourinary: Negative for dysuria, frequency and urgency. Musculoskeletal: Negative for arthralgias, back pain and myalgias. Skin: Negative for color change and rash. Neurological: Negative for dizziness, weakness and headaches. Hematological: Negative for adenopathy. Does not bruise/bleed easily. Psychiatric/Behavioral: Negative for agitation, dysphoric mood and sleep disturbance. Objective   Physical Exam  Vitals and nursing note reviewed. Constitutional:       General: He is not in acute distress. Appearance: Normal appearance. HENT:      Head: Normocephalic and atraumatic. Right Ear: Tympanic membrane normal.      Left Ear: Tympanic membrane normal.      Nose: Nose normal.   Eyes:      Extraocular Movements: Extraocular movements intact. Conjunctiva/sclera: Conjunctivae normal.      Pupils: Pupils are equal, round, and reactive to light. Neck:      Thyroid: Thyromegaly and thyroid tenderness present. Vascular: No carotid bruit. Cardiovascular:      Rate and Rhythm: Normal rate and regular rhythm. Pulses: Normal pulses. Heart sounds: No murmur heard. Pulmonary:      Effort: Pulmonary effort is normal. No respiratory distress. Breath sounds: Normal breath sounds. Abdominal:      General: Abdomen is flat. Bowel sounds are normal. There is no distension. Palpations: Abdomen is soft. Tenderness: There is no abdominal tenderness. Musculoskeletal:         General: No swelling, tenderness or deformity. Cervical back: Normal range of motion and neck supple. No rigidity or tenderness. Right lower leg: No edema.       Left lower leg: No edema. Lymphadenopathy:      Cervical: No cervical adenopathy. Skin:     Coloration: Skin is not jaundiced. Findings: No bruising, erythema or lesion. Neurological:      General: No focal deficit present. Mental Status: He is alert and oriented to person, place, and time. Cranial Nerves: No cranial nerve deficit. Motor: No weakness. Gait: Gait normal.            This dictation was generated by voice recognition computer software. Although all attempts are made to edit the dictation for accuracy, there may be errors in the transcription that are not intended. An electronic signature was used to authenticate this note.     --Constantine Reyes MD

## 2022-03-28 ENCOUNTER — TELEPHONE (OUTPATIENT)
Dept: PULMONOLOGY | Age: 47
End: 2022-03-28

## 2022-03-28 NOTE — TELEPHONE ENCOUNTER
Patient left message on Sleep Lab voicemail about CPAP. I attempted to call pt back but VM was full. Patient can call [facility]/  [phone] to follow up on order sent on 03/14/22.

## 2022-03-30 ENCOUNTER — HOSPITAL ENCOUNTER (OUTPATIENT)
Age: 47
Discharge: HOME | End: 2022-03-30
Payer: COMMERCIAL

## 2022-03-30 DIAGNOSIS — K75.9 HEPATITIS: ICD-10-CM

## 2022-03-30 LAB
HBV SURFACE AB TITR SER: <3.5 MIU/ML
HEPATITIS B CORE IGM ANTIBODY: NORMAL
HEPATITIS B SURFACE ANTIGEN INTERPRETATION: NORMAL
HEPATITIS C ANTIBODY INTERPRETATION: NORMAL

## 2022-03-30 PROCEDURE — 86708 HEPATITIS A ANTIBODY: CPT

## 2022-03-30 PROCEDURE — 36415 COLL VENOUS BLD VENIPUNCTURE: CPT

## 2022-03-30 PROCEDURE — 87340 HEPATITIS B SURFACE AG IA: CPT

## 2022-03-30 PROCEDURE — 86706 HEP B SURFACE ANTIBODY: CPT

## 2022-03-30 PROCEDURE — 86803 HEPATITIS C AB TEST: CPT

## 2022-03-30 PROCEDURE — 80053 COMPREHEN METABOLIC PANEL: CPT

## 2022-03-30 PROCEDURE — 86705 HEP B CORE ANTIBODY IGM: CPT

## 2022-03-30 RX ORDER — SUCRALFATE 1 G/1
1 TABLET ORAL 3 TIMES DAILY PRN
Qty: 90 TABLET | Refills: 3 | OUTPATIENT
Start: 2022-03-30

## 2022-03-31 LAB
A/G RATIO: 1.3 (ref 1.1–2.2)
ALBUMIN SERPL-MCNC: 4.3 G/DL (ref 3.4–5)
ALP BLD-CCNC: 93 U/L (ref 40–129)
ALT SERPL-CCNC: 27 U/L (ref 10–40)
ANION GAP SERPL CALCULATED.3IONS-SCNC: 18 MMOL/L (ref 3–16)
AST SERPL-CCNC: 26 U/L (ref 15–37)
BILIRUB SERPL-MCNC: 0.3 MG/DL (ref 0–1)
BUN BLDV-MCNC: 13 MG/DL (ref 7–20)
CALCIUM SERPL-MCNC: 9.6 MG/DL (ref 8.3–10.6)
CHLORIDE BLD-SCNC: 104 MMOL/L (ref 99–110)
CO2: 19 MMOL/L (ref 21–32)
CREAT SERPL-MCNC: 0.9 MG/DL (ref 0.9–1.3)
GFR AFRICAN AMERICAN: >60
GFR NON-AFRICAN AMERICAN: >60
GLUCOSE BLD-MCNC: 94 MG/DL (ref 70–99)
POTASSIUM SERPL-SCNC: 4.3 MMOL/L (ref 3.5–5.1)
SODIUM BLD-SCNC: 141 MMOL/L (ref 136–145)
TOTAL PROTEIN: 7.5 G/DL (ref 6.4–8.2)

## 2022-04-01 ENCOUNTER — TELEPHONE (OUTPATIENT)
Dept: INTERNAL MEDICINE CLINIC | Age: 47
End: 2022-04-01

## 2022-04-01 LAB — HAV AB SERPL IA-ACNC: NEGATIVE

## 2022-04-02 ENCOUNTER — HOSPITAL ENCOUNTER (OUTPATIENT)
Dept: ULTRASOUND IMAGING | Age: 47
Discharge: HOME | End: 2022-04-02
Payer: COMMERCIAL

## 2022-04-02 DIAGNOSIS — E01.0 THYROMEGALY: ICD-10-CM

## 2022-04-02 DIAGNOSIS — K75.9 HEPATITIS: ICD-10-CM

## 2022-04-02 PROCEDURE — 76705 ECHO EXAM OF ABDOMEN: CPT

## 2022-04-02 PROCEDURE — 76536 US EXAM OF HEAD AND NECK: CPT

## 2022-04-04 ENCOUNTER — TELEPHONE (OUTPATIENT)
Dept: INTERNAL MEDICINE CLINIC | Age: 47
End: 2022-04-04

## 2022-04-04 NOTE — TELEPHONE ENCOUNTER
All his test results are acceptable in addition his liver function is back to normal his ultrasound of the liver is normal and his thyroid ultrasound is showing some inflammation but that is chronic and a simple benign cyst and no biopsies are needed

## 2022-04-15 ENCOUNTER — HOSPITAL ENCOUNTER (OUTPATIENT)
Dept: NON INVASIVE DIAGNOSTICS | Age: 47
Discharge: HOME | End: 2022-04-15
Payer: COMMERCIAL

## 2022-04-15 DIAGNOSIS — R07.89 OTHER CHEST PAIN: ICD-10-CM

## 2022-04-15 PROCEDURE — 93017 CV STRESS TEST TRACING ONLY: CPT | Performed by: INTERNAL MEDICINE

## 2022-04-15 NOTE — PROGRESS NOTES
Patient instructed on Plain Kelvin Protocol Stress Test Procedure including possible side effects. Verbalizes knowledge and understanding and denies having any questions.

## 2022-06-01 RX ORDER — VENLAFAXINE HYDROCHLORIDE 75 MG/1
CAPSULE, EXTENDED RELEASE ORAL
Qty: 30 CAPSULE | Refills: 3 | OUTPATIENT
Start: 2022-06-01

## 2022-06-07 RX ORDER — VENLAFAXINE HYDROCHLORIDE 75 MG/1
75 CAPSULE, EXTENDED RELEASE ORAL DAILY
Qty: 30 CAPSULE | Refills: 3 | Status: SHIPPED | OUTPATIENT
Start: 2022-06-07 | End: 2022-09-26

## 2022-06-07 NOTE — TELEPHONE ENCOUNTER
Patient called in requesting refill of venlafaxine (EFFEXOR XR) 75 MG extended release capsule to be sent to [facility] on [address]     Last OV: 03/25/2022  Next OV: 06/27/2022

## 2022-06-17 ENCOUNTER — OFFICE VISIT (OUTPATIENT)
Dept: INTERNAL MEDICINE CLINIC | Age: 47
End: 2022-06-17
Payer: COMMERCIAL

## 2022-06-17 VITALS
DIASTOLIC BLOOD PRESSURE: 78 MMHG | BODY MASS INDEX: 36.61 KG/M2 | HEART RATE: 98 BPM | SYSTOLIC BLOOD PRESSURE: 120 MMHG | OXYGEN SATURATION: 99 % | WEIGHT: 240.8 LBS

## 2022-06-17 DIAGNOSIS — E03.9 ACQUIRED HYPOTHYROIDISM: ICD-10-CM

## 2022-06-17 DIAGNOSIS — M54.12 CERVICAL RADICULITIS: Primary | ICD-10-CM

## 2022-06-17 DIAGNOSIS — G47.33 OSA (OBSTRUCTIVE SLEEP APNEA): ICD-10-CM

## 2022-06-17 LAB — TSH SERPL DL<=0.05 MIU/L-ACNC: 6.99 UIU/ML (ref 0.27–4.2)

## 2022-06-17 PROCEDURE — G8427 DOCREV CUR MEDS BY ELIG CLIN: HCPCS | Performed by: INTERNAL MEDICINE

## 2022-06-17 PROCEDURE — 99214 OFFICE O/P EST MOD 30 MIN: CPT | Performed by: INTERNAL MEDICINE

## 2022-06-17 PROCEDURE — G8417 CALC BMI ABV UP PARAM F/U: HCPCS | Performed by: INTERNAL MEDICINE

## 2022-06-17 PROCEDURE — 1036F TOBACCO NON-USER: CPT | Performed by: INTERNAL MEDICINE

## 2022-06-17 RX ORDER — METHOCARBAMOL 500 MG/1
500 TABLET, FILM COATED ORAL 4 TIMES DAILY
Qty: 40 TABLET | Refills: 0 | Status: SHIPPED | OUTPATIENT
Start: 2022-06-17 | End: 2022-06-27

## 2022-06-17 RX ORDER — PREDNISONE 20 MG/1
20 TABLET ORAL 2 TIMES DAILY
Qty: 10 TABLET | Refills: 0 | Status: SHIPPED | OUTPATIENT
Start: 2022-06-17 | End: 2022-06-22

## 2022-06-17 ASSESSMENT — ENCOUNTER SYMPTOMS
ABDOMINAL PAIN: 0
VISUAL CHANGE: 0
SORE THROAT: 0
VOMITING: 0
CHANGE IN BOWEL HABIT: 0
NAUSEA: 0

## 2022-06-17 NOTE — PROGRESS NOTES
Mirna Brooks (:  1975) is a 40 y.o. male,New patient, here for evaluation of the following chief complaint(s):  Dizziness (started yesterday.) and Headache (feeling off, lightheaded. R eye feels weak.)         ASSESSMENT/PLAN:  1. Cervical radiculitis  -     predniSONE (DELTASONE) 20 MG tablet; Take 1 tablet by mouth 2 times daily for 5 days, Disp-10 tablet, R-0Normal  -     methocarbamol (ROBAXIN) 500 MG tablet; Take 1 tablet by mouth 4 times daily for 10 days, Disp-40 tablet, R-0Normal  2. DAGO (obstructive sleep apnea)  3. Acquired hypothyroidism  -     TSH; Future  At this point patient findings are very consistent with cervical radiculopathy affecting the upper part of his cervical spine causing him to have the headache so we will get a focus on conservative treatment including heat anti-inflammatories muscle relaxant and exercises if his symptoms do not improve then we will get him proceed to doing imaging as well as physical therapy the patient does have an extensive history of cervical radicular issues where he needed to have surgery about 6 years ago so we will get a proceed with the conservative treatment for now and decide as he respond to it or not on the neck steps of    Patient thyroid has almost been back to normal and we will need to get the second reading where it is level to decide if any more adjustment is needed or not    Patient is on CPAP for sleep apnea he seems to be tolerating it fairly we will can continue to monitor for now    Return if symptoms worsen or fail to improve, for As scheduled.          Subjective   SUBJECTIVE/OBJECTIVE:    Lab Review   Lab Results   Component Value Date     2022     2022     2022    K 4.3 2022    K 4.3 2022    K 3.7 2022    K 4.1 2021    CO2 19 2022    CO2 23 2022    CO2 23 2022    BUN 13 2022    BUN 12 2022    BUN 15 2022    CREATININE 0.9 2022 CREATININE 1.0 03/25/2022    CREATININE 1.2 01/20/2022    GLUCOSE 94 03/30/2022    GLUCOSE 93 03/25/2022    GLUCOSE 80 01/20/2022    CALCIUM 9.6 03/30/2022    CALCIUM 9.9 03/25/2022    CALCIUM 9.4 01/20/2022     Lab Results   Component Value Date    WBC 7.2 03/25/2022    WBC 8.6 01/20/2022    WBC 7.5 01/20/2022    HGB 14.3 03/25/2022    HGB 13.6 01/20/2022    HGB 13.6 01/20/2022    HCT 43.7 03/25/2022    HCT 41.3 01/20/2022    HCT 41.7 01/20/2022    MCV 90.2 03/25/2022    MCV 93.3 01/20/2022    MCV 92.4 01/20/2022     03/25/2022     01/20/2022     01/20/2022     Lab Results   Component Value Date    CHOL 286 01/20/2022    CHOL 299 01/20/2022    CHOL 218 07/15/2015    TRIG 117 01/20/2022    TRIG 135 01/20/2022    TRIG 300 07/15/2015    HDL 39 01/20/2022    HDL 41 01/20/2022    HDL 28 07/15/2015       Vitals 6/17/2022 3/25/2022 6/41/3646   SYSTOLIC [phone]   DIASTOLIC 78 80 78   Site - - -   Position - - -   Cuff Size - - -   Pulse 98 99 88   Temp - - -   Resp - - -   SpO2 99 - -   Weight 240 lb 12.8 oz 241 lb 6.4 oz 250 lb   Height - 5' 8\" 5' 8\"   Body mass index - 36.7 kg/m2 38.01 kg/m2   Pain Level - - -   Some recent data might be hidden       Dizziness  This is a new problem. The current episode started yesterday. Associated symptoms include headaches and neck pain. Pertinent negatives include no abdominal pain, arthralgias, change in bowel habit, chest pain, congestion, nausea, rash, sore throat, urinary symptoms, vertigo, visual change, vomiting or weakness. Headache   This is a new problem. The current episode started yesterday. The pain is located in the occipital region. Associated symptoms include dizziness and neck pain. Pertinent negatives include no abdominal pain, nausea, sore throat, visual change, vomiting or weakness. Review of Systems   HENT: Negative for congestion and sore throat. Cardiovascular: Negative for chest pain.    Gastrointestinal: Negative for abdominal pain, change in bowel habit, nausea and vomiting. Musculoskeletal: Positive for neck pain. Negative for arthralgias. Skin: Negative for rash. Neurological: Positive for dizziness and headaches. Negative for vertigo and weakness. Objective   Physical Exam  Constitutional:       General: He is not in acute distress. Appearance: Normal appearance. HENT:      Head: Normocephalic and atraumatic. Right Ear: Tympanic membrane normal.      Left Ear: Tympanic membrane normal.      Nose: Nose normal.   Eyes:      Extraocular Movements: Extraocular movements intact. Conjunctiva/sclera: Conjunctivae normal.      Pupils: Pupils are equal, round, and reactive to light. Neck:      Vascular: No carotid bruit. Cardiovascular:      Rate and Rhythm: Normal rate and regular rhythm. Pulses: Normal pulses. Heart sounds: No murmur heard. Pulmonary:      Effort: Pulmonary effort is normal. No respiratory distress. Breath sounds: Normal breath sounds. Abdominal:      General: Abdomen is flat. Bowel sounds are normal. There is no distension. Palpations: Abdomen is soft. Tenderness: There is no abdominal tenderness. Musculoskeletal:         General: No swelling, tenderness or deformity. Cervical back: Normal range of motion and neck supple. No rigidity or tenderness. Right lower leg: No edema. Left lower leg: No edema. Lymphadenopathy:      Cervical: No cervical adenopathy. Skin:     Coloration: Skin is not jaundiced. Findings: No bruising, erythema or lesion. Neurological:      General: No focal deficit present. Mental Status: He is alert and oriented to person, place, and time. Cranial Nerves: No cranial nerve deficit. Motor: No weakness. Gait: Gait normal.            This dictation was generated by voice recognition computer software.   Although all attempts are made to edit the dictation for accuracy, there may be errors in the transcription that are not intended. An electronic signature was used to authenticate this note.     --Rosas Varela MD

## 2022-06-17 NOTE — PATIENT INSTRUCTIONS
Patient Education        Back Stretches: Exercises  Introduction  Here are some examples of exercises for stretching your back. Start eachexercise slowly. Ease off the exercise if you start to have pain. Your doctor or physical therapist will tell you when you can start theseexercises and which ones will work best for you. How to do the exercises  Overhead stretch    1. Stand comfortably with your feet shoulder-width apart. 2. Looking straight ahead, raise both arms over your head and reach toward the ceiling. Do not allow your head to tilt back. 3. Hold for 15 to 30 seconds, then lower your arms to your sides. 4. Repeat 2 to 4 times. Side stretch    1. Stand comfortably with your feet shoulder-width apart. 2. Raise one arm over your head, and then lean to the other side. 3. Slide your hand down your leg as you let the weight of your arm gently stretch your side muscles. Hold for 15 to 30 seconds. 4. Repeat 2 to 4 times on each side. Press-up    1. Lie on your stomach, supporting your body with your forearms. 2. Press your elbows down into the floor to raise your upper back. As you do this, relax your stomach muscles and allow your back to arch without using your back muscles. As your press up, do not let your hips or pelvis come off the floor. 3. Hold for 15 to 30 seconds, then relax. 4. Repeat 2 to 4 times. Relax and rest    1. Lie on your back with a rolled towel under your neck and a pillow under your knees. Extend your arms comfortably to your sides. 2. Relax and breathe normally. 3. Remain in this position for about 10 minutes. 4. If you can, do this 2 or 3 times each day. Follow-up care is a key part of your treatment and safety. Be sure to make and go to all appointments, and call your doctor if you are having problems. It's also a good idea to know your test results and keep alist of the medicines you take. Where can you learn more? Go to https://jaime.[employer]Ripple Commerce. org and heating pad on a low or medium setting for 15 to 20 minutes every 2 or 3 hours. Try a warm shower in place of one session with the heating pad. You can also buy single-use heat wraps that last up to 8 hours.  You can also try an ice pack for 10 to 15 minutes every 2 to 3 hours. There isn't strong evidence that either heat or ice will help. But you can try them to see if they help you.  Don't spend too long in one position. Take short breaks to move around and change positions.  Wear a seat belt and shoulder harness when you are in a car.  Sleep with a pillow under your head and neck that keeps your neck straight.  If you were given a neck brace (cervical collar) to limit neck motion, wear it as instructed for as many days as your doctor tells you to. Do not wear it longer than you were told to. Wearing a brace for too long can lead to neck stiffness and can weaken the neck muscles.  Follow your doctor's instructions for gentle neck-stretching exercises.  Do not smoke. Smoking can slow healing of your discs. If you need help quitting, talk to your doctor about stop-smoking programs and medicines. These can increase your chances of quitting for good.  Avoid strenuous work or exercise until your doctor says it is okay. When should you call for help? Call 911 anytime you think you may need emergency care. For example, call if:     You are unable to move an arm or a leg at all. Call your doctor now or seek immediate medical care if:     You have new or worse symptoms in your arms, legs, chest, belly, or buttocks. Symptoms may include:  ? Numbness or tingling. ? Weakness. ? Pain.      You lose bladder or bowel control. Watch closely for changes in your health, and be sure to contact your doctor if:     You are not getting better as expected. Where can you learn more? Go to https://chyasireb.Technimotion. org and sign in to your Sealed account.  Enter Q934 in the [address] Information box to learn more about \"Pinched Nerve in the Neck: Care Instructions. \"     If you do not have an account, please click on the \"Sign Up Now\" link. Current as of: July 1, 2021               Content Version: 13.2  © 2006-2022 Healthwise, Incorporated. Care instructions adapted under license by [facility] ([facility]). If you have questions about a medical condition or this instruction, always ask your healthcare professional. [address] any warranty or liability for your use of this information. Patient Education        Neck Arthritis: Exercises  Introduction  Here are some examples of exercises for you to try. The exercises may be suggested for a condition or for rehabilitation. Start each exercise slowly. Ease off the exercises if you start to have pain. You will be told when to start these exercises and which ones will work bestfor you. How to do the exercises  Neck stretches to the side    1. This stretch works best if you keep your shoulder down as you lean away from it. To help you remember to do this, start by relaxing your shoulders and lightly holding on to your thighs or your chair. 2. Tilt your head toward your shoulder and hold for 15 to 30 seconds. Let the weight of your head stretch your muscles. 3. Repeat 2 to 4 times toward each shoulder. Chin tuck    1. Lie on the floor with a rolled-up towel under your neck. Your head should be touching the floor. 2. Slowly bring your chin toward your chest.  3. Hold for a count of 6, and then relax for up to 10 seconds. 4. Repeat 8 to 12 times. Active cervical rotation    1. Sit in a firm chair, or stand up straight. 2. Keeping your chin level, turn your head to the right, and hold for 15 to 30 seconds. 3. Turn your head to the left and hold for 15 to 30 seconds. 4. Repeat 2 to 4 times to each side. Shoulder blade squeeze    1. While standing, squeeze your shoulder blades together.   2. Do not raise your shoulders up as you are squeezing. 3. Hold for 6 seconds. 4. Repeat 8 to 12 times. Shoulder rolls    1. Sit comfortably with your feet shoulder-width apart. You can also do this exercise standing up. 2. Roll your shoulders up, then back, and then down in a smooth, circular motion. 3. Repeat 2 to 4 times. Follow-up care is a key part of your treatment and safety. Be sure to make and go to all appointments, and call your doctor if you are having problems. It's also a good idea to know your test results and keep alist of the medicines you take. Where can you learn more? Go to https://TVpluspeTownSquared.db4objects. org and sign in to your SCREEMO account. Enter X414 in the [employer] box to learn more about \"Neck Arthritis: Exercises. \"     If you do not have an account, please click on the \"Sign Up Now\" link. Current as of: July 1, 2021               Content Version: 13.2  © 2006-2022 [employer]. Care instructions adapted under license by [facility] ([facility]). If you have questions about a medical condition or this instruction, always ask your healthcare professional. [place] [address] any warranty or liability for your use of this information. Patient Education        Neck Spasm: Exercises  Introduction  Here are some examples of exercises for you to try. The exercises may be suggested for a condition or for rehabilitation. Start each exercise slowly. Ease off the exercises if you start to have pain. You will be told when to start these exercises and which ones will work bestfor you. How to do the exercises  Levator scapula stretch    1. Sit in a firm chair, or stand up straight. 2. Gently tilt your head toward your left shoulder. 3. Turn your head to look down into your armpit, bending your head slightly forward. Let the weight of your head stretch your neck muscles. 4. Hold for 15 to 30 seconds. 5. Return to your starting position.   6. Follow the same instructions above, but tilt your head toward your right shoulder. 7. Repeat 2 to 4 times toward each shoulder. Upper trapezius stretch    1. Sit in a firm chair, or stand up straight. 2. This stretch works best if you keep your shoulder down as you lean away from it. To help you remember to do this, start by relaxing your shoulders and lightly holding on to your thighs or your chair. 3. Tilt your head toward your shoulder and hold for 15 to 30 seconds. Let the weight of your head stretch your muscles. 4. If you would like a little added stretch, place your arm behind your back. Use the arm opposite of the direction you are tilting your head. For example, if you are tilting your head to the left, place your right arm behind your back. 5. Repeat 2 to 4 times toward each shoulder. Neck rotation    1. Sit in a firm chair, or stand up straight. 2. Keeping your chin level, turn your head to the right, and hold for 15 to 30 seconds. 3. Turn your head to the left, and hold for 15 to 30 seconds. 4. Repeat 2 to 4 times to each side. Chin tuck    1. Lie on the floor with a rolled-up towel under your neck. Your head should be touching the floor. 2. Slowly bring your chin toward the front of your neck. 3. Hold for a count of 6, and then relax for up to 10 seconds. 4. Repeat 8 to 12 times. Forward neck flexion    1. Sit in a firm chair, or stand up straight. 2. Bend your head forward. 3. Hold for 15 to 30 seconds, then return to your starting position. 4. Repeat 2 to 4 times. Follow-up care is a key part of your treatment and safety. Be sure to make and go to all appointments, and call your doctor if you are having problems. It's also a good idea to know your test results and keep alist of the medicines you take. Where can you learn more? Go to https://IdiromerryGID Group.Flatiron Apps. org and sign in to your M[employer] account. Enter P962 in the [employer] box to learn more about \"Neck Spasm: Exercises. \"     If you do not have an account, please click on the \"Sign Up Now\" link. Current as of: July 1, 2021               Content Version: 13.2  © 2006-2022 [employer]. Care instructions adapted under license by [facility] ([facility]). If you have questions about a medical condition or this instruction, always ask your healthcare professional. [address] any warranty or liability for your use of this information. Patient Education         Spinal Stenosis: Home Treatment and Physical Therapy (01:37)  Your health professional recommends that you watch this short online healthvideo. Learn how home treatment and physical therapy can help you avoid surgery. Purpose:  Explains how home treatment and physical therapy can help patients avoidsurgery for spinal stenosis. Goal:  The user will identify key points about nonsurgical treatment options. How to watch the video    Scan the QR code   OR Visit the website    [email or web address]   Current as of: October 6, 2021               Content Version: 13.2  © 2006-2022 [employer]. Care instructions adapted under license by [facility] ([facility]). If you have questions about a medical condition or this instruction, always ask your healthcare professional. [address] any warranty or liability for your use of this information.

## 2022-06-21 RX ORDER — LEVOTHYROXINE SODIUM 137 UG/1
137 TABLET ORAL DAILY
Qty: 90 TABLET | Refills: 1 | Status: SHIPPED | OUTPATIENT
Start: 2022-06-21 | End: 2022-09-19 | Stop reason: SDUPTHER

## 2022-06-27 ENCOUNTER — OFFICE VISIT (OUTPATIENT)
Dept: INTERNAL MEDICINE CLINIC | Age: 47
End: 2022-06-27
Payer: COMMERCIAL

## 2022-06-27 VITALS
WEIGHT: 241.2 LBS | HEART RATE: 88 BPM | BODY MASS INDEX: 36.67 KG/M2 | OXYGEN SATURATION: 98 % | SYSTOLIC BLOOD PRESSURE: 120 MMHG | DIASTOLIC BLOOD PRESSURE: 84 MMHG

## 2022-06-27 DIAGNOSIS — K21.00 GASTROESOPHAGEAL REFLUX DISEASE WITH ESOPHAGITIS WITHOUT HEMORRHAGE: ICD-10-CM

## 2022-06-27 DIAGNOSIS — E03.9 ACQUIRED HYPOTHYROIDISM: Primary | ICD-10-CM

## 2022-06-27 DIAGNOSIS — R73.03 PREDIABETES: ICD-10-CM

## 2022-06-27 PROCEDURE — 1036F TOBACCO NON-USER: CPT | Performed by: INTERNAL MEDICINE

## 2022-06-27 PROCEDURE — 99214 OFFICE O/P EST MOD 30 MIN: CPT | Performed by: INTERNAL MEDICINE

## 2022-06-27 PROCEDURE — G8417 CALC BMI ABV UP PARAM F/U: HCPCS | Performed by: INTERNAL MEDICINE

## 2022-06-27 PROCEDURE — G8427 DOCREV CUR MEDS BY ELIG CLIN: HCPCS | Performed by: INTERNAL MEDICINE

## 2022-06-27 ASSESSMENT — PATIENT HEALTH QUESTIONNAIRE - PHQ9
2. FEELING DOWN, DEPRESSED OR HOPELESS: 1
SUM OF ALL RESPONSES TO PHQ QUESTIONS 1-9: 4
10. IF YOU CHECKED OFF ANY PROBLEMS, HOW DIFFICULT HAVE THESE PROBLEMS MADE IT FOR YOU TO DO YOUR WORK, TAKE CARE OF THINGS AT HOME, OR GET ALONG WITH OTHER PEOPLE: 1
8. MOVING OR SPEAKING SO SLOWLY THAT OTHER PEOPLE COULD HAVE NOTICED. OR THE OPPOSITE, BEING SO FIGETY OR RESTLESS THAT YOU HAVE BEEN MOVING AROUND A LOT MORE THAN USUAL: 0
6. FEELING BAD ABOUT YOURSELF - OR THAT YOU ARE A FAILURE OR HAVE LET YOURSELF OR YOUR FAMILY DOWN: 0
SUM OF ALL RESPONSES TO PHQ QUESTIONS 1-9: 4
5. POOR APPETITE OR OVEREATING: 0
SUM OF ALL RESPONSES TO PHQ QUESTIONS 1-9: 4
4. FEELING TIRED OR HAVING LITTLE ENERGY: 1
SUM OF ALL RESPONSES TO PHQ9 QUESTIONS 1 & 2: 2
3. TROUBLE FALLING OR STAYING ASLEEP: 0
7. TROUBLE CONCENTRATING ON THINGS, SUCH AS READING THE NEWSPAPER OR WATCHING TELEVISION: 1
9. THOUGHTS THAT YOU WOULD BE BETTER OFF DEAD, OR OF HURTING YOURSELF: 0
SUM OF ALL RESPONSES TO PHQ QUESTIONS 1-9: 4
1. LITTLE INTEREST OR PLEASURE IN DOING THINGS: 1

## 2022-06-27 ASSESSMENT — ENCOUNTER SYMPTOMS
BELCHING: 0
WATER BRASH: 0
SORE THROAT: 0
ABDOMINAL PAIN: 0
WHEEZING: 0
STRIDOR: 0

## 2022-06-27 NOTE — PROGRESS NOTES
Bernard Sandoval (:  1975) is a 40 y.o. male,New patient, here for evaluation of the following chief complaint(s):  No chief complaint on file. ASSESSMENT/PLAN:  1. Acquired hypothyroidism  Assessment & Plan: We will current recheck the patient thyroid function within 4 weeks to see if there is any need for further adjustment  Orders:  -     TSH; Future  2. Prediabetes  Assessment & Plan:  Reviewed patient the need for active adjustment of his lifestyle including eating differently and exercise regularly to try and lose weight since its affecting him in different ways including increasing his cholesterol as well as the sleep apnea he is having and probably down the road this can affect him from a arthritic point of view  3. Gastroesophageal reflux disease with esophagitis without hemorrhage      Return in about 6 months (around 2022).          Subjective   SUBJECTIVE/OBJECTIVE:    Lab Review   Lab Results   Component Value Date     2022     2022     2022    K 4.3 2022    K 4.3 2022    K 3.7 2022    K 4.1 2021    CO2 19 2022    CO2 23 2022    CO2 23 2022    BUN 13 2022    BUN 12 2022    BUN 15 2022    CREATININE 0.9 2022    CREATININE 1.0 2022    CREATININE 1.2 2022    GLUCOSE 94 2022    GLUCOSE 93 2022    GLUCOSE 80 2022    CALCIUM 9.6 2022    CALCIUM 9.9 2022    CALCIUM 9.4 2022     Lab Results   Component Value Date    WBC 7.2 2022    WBC 8.6 2022    WBC 7.5 2022    HGB 14.3 2022    HGB 13.6 2022    HGB 13.6 2022    HCT 43.7 2022    HCT 41.3 2022    HCT 41.7 2022    MCV 90.2 2022    MCV 93.3 2022    MCV 92.4 2022     2022     2022     2022     Lab Results   Component Value Date    CHOL 286 2022    CHOL 299 2022    CHOL 218 07/15/2015    TRIG 117 01/20/2022    TRIG 135 01/20/2022    TRIG 300 07/15/2015    HDL 39 01/20/2022    HDL 41 01/20/2022    HDL 28 07/15/2015       Vitals 6/27/2022 6/17/2022 7/78/3784   SYSTOLIC [id number]   DIASTOLIC 84 78 80   Site - - -   Position - - -   Cuff Size - - -   Pulse 88 98 99   Temp - - -   Resp - - -   SpO2 98 99 -   Weight 241 lb 3.2 oz 240 lb 12.8 oz 241 lb 6.4 oz   Height - - 5' 8\"   Body mass index - - 36.7 kg/m2   Pain Level - - -   Some recent data might be hidden       Patient is findings with the underactive thyroid and adjustment to his dosage discussed with him his symptomatology has not been any significant change we discussed the thyroid disease itself and how it may need to be adjusted on regular basis    The patient continues to have some neck discomfort but it is gradually improving        Gastroesophageal Reflux  He reports no abdominal pain, no belching, no chest pain, no sore throat, no stridor, no tooth decay, no water brash or no wheezing. Review of Systems   HENT: Negative for sore throat. Respiratory: Negative for wheezing. Cardiovascular: Negative for chest pain. Gastrointestinal: Negative for abdominal pain. Objective   Physical Exam  Vitals and nursing note reviewed. Constitutional:       General: He is not in acute distress. Appearance: Normal appearance. HENT:      Head: Normocephalic and atraumatic. Right Ear: Tympanic membrane normal.      Left Ear: Tympanic membrane normal.      Nose: Nose normal.   Eyes:      Extraocular Movements: Extraocular movements intact. Conjunctiva/sclera: Conjunctivae normal.      Pupils: Pupils are equal, round, and reactive to light. Neck:      Vascular: No carotid bruit. Cardiovascular:      Rate and Rhythm: Normal rate and regular rhythm. Pulses: Normal pulses. Heart sounds: No murmur heard. Pulmonary:      Effort: Pulmonary effort is normal. No respiratory distress. Breath sounds: Normal breath sounds. Abdominal:      General: Abdomen is flat. Bowel sounds are normal. There is no distension. Palpations: Abdomen is soft. Tenderness: There is no abdominal tenderness. Musculoskeletal:         General: No swelling, tenderness or deformity. Cervical back: Normal range of motion and neck supple. No rigidity or tenderness. Right lower leg: No edema. Left lower leg: No edema. Lymphadenopathy:      Cervical: No cervical adenopathy. Skin:     Coloration: Skin is not jaundiced. Findings: No bruising, erythema or lesion. Neurological:      General: No focal deficit present. Mental Status: He is alert and oriented to person, place, and time. Cranial Nerves: No cranial nerve deficit. Motor: No weakness. Gait: Gait normal.            This dictation was generated by voice recognition computer software. Although all attempts are made to edit the dictation for accuracy, there may be errors in the transcription that are not intended. An electronic signature was used to authenticate this note.     --Isaura Mosley MD

## 2022-06-27 NOTE — ASSESSMENT & PLAN NOTE
Reviewed patient the need for active adjustment of his lifestyle including eating differently and exercise regularly to try and lose weight since its affecting him in different ways including increasing his cholesterol as well as the sleep apnea he is having and probably down the road this can affect him from a arthritic point of view

## 2022-06-27 NOTE — ASSESSMENT & PLAN NOTE
We will current recheck the patient thyroid function within 4 weeks to see if there is any need for further adjustment

## 2022-07-03 DIAGNOSIS — E55.9 VITAMIN D DEFICIENCY: ICD-10-CM

## 2022-07-05 RX ORDER — ERGOCALCIFEROL 1.25 MG/1
CAPSULE ORAL
Qty: 12 CAPSULE | Refills: 1 | Status: SHIPPED | OUTPATIENT
Start: 2022-07-05

## 2022-07-06 DIAGNOSIS — J45.41 MODERATE PERSISTENT ASTHMA WITH ACUTE EXACERBATION: ICD-10-CM

## 2022-07-06 RX ORDER — BUDESONIDE AND FORMOTEROL FUMARATE DIHYDRATE 160; 4.5 UG/1; UG/1
AEROSOL RESPIRATORY (INHALATION)
Qty: 10.2 EACH | Refills: 5 | OUTPATIENT
Start: 2022-07-06

## 2022-07-28 ENCOUNTER — OFFICE VISIT (OUTPATIENT)
Dept: PULMONOLOGY | Age: 47
End: 2022-07-28
Payer: COMMERCIAL

## 2022-07-28 VITALS
SYSTOLIC BLOOD PRESSURE: 110 MMHG | WEIGHT: 242 LBS | HEART RATE: 103 BPM | HEIGHT: 68 IN | BODY MASS INDEX: 36.68 KG/M2 | OXYGEN SATURATION: 97 % | DIASTOLIC BLOOD PRESSURE: 72 MMHG

## 2022-07-28 DIAGNOSIS — J45.30 MILD PERSISTENT ASTHMA WITHOUT COMPLICATION: ICD-10-CM

## 2022-07-28 DIAGNOSIS — E66.01 CLASS 2 SEVERE OBESITY DUE TO EXCESS CALORIES WITH SERIOUS COMORBIDITY AND BODY MASS INDEX (BMI) OF 39.0 TO 39.9 IN ADULT (HCC): ICD-10-CM

## 2022-07-28 DIAGNOSIS — G47.33 OSA (OBSTRUCTIVE SLEEP APNEA): Primary | ICD-10-CM

## 2022-07-28 PROCEDURE — 99214 OFFICE O/P EST MOD 30 MIN: CPT | Performed by: INTERNAL MEDICINE

## 2022-07-28 PROCEDURE — 1036F TOBACCO NON-USER: CPT | Performed by: INTERNAL MEDICINE

## 2022-07-28 PROCEDURE — G8417 CALC BMI ABV UP PARAM F/U: HCPCS | Performed by: INTERNAL MEDICINE

## 2022-07-28 PROCEDURE — G8427 DOCREV CUR MEDS BY ELIG CLIN: HCPCS | Performed by: INTERNAL MEDICINE

## 2022-07-28 ASSESSMENT — SLEEP AND FATIGUE QUESTIONNAIRES
HOW LIKELY ARE YOU TO NOD OFF OR FALL ASLEEP WHEN YOU ARE A PASSENGER IN A CAR FOR AN HOUR WITHOUT A BREAK: 3
HOW LIKELY ARE YOU TO NOD OFF OR FALL ASLEEP WHILE SITTING QUIETLY AFTER LUNCH WITHOUT ALCOHOL: 2
HOW LIKELY ARE YOU TO NOD OFF OR FALL ASLEEP WHILE LYING DOWN TO REST IN THE AFTERNOON WHEN CIRCUMSTANCES PERMIT: 3
HOW LIKELY ARE YOU TO NOD OFF OR FALL ASLEEP WHILE SITTING AND TALKING TO SOMEONE: 1
HOW LIKELY ARE YOU TO NOD OFF OR FALL ASLEEP WHILE SITTING AND READING: 3
HOW LIKELY ARE YOU TO NOD OFF OR FALL ASLEEP IN A CAR, WHILE STOPPED FOR A FEW MINUTES IN TRAFFIC: 0
HOW LIKELY ARE YOU TO NOD OFF OR FALL ASLEEP WHILE SITTING INACTIVE IN A PUBLIC PLACE: 0
ESS TOTAL SCORE: 14
HOW LIKELY ARE YOU TO NOD OFF OR FALL ASLEEP WHILE WATCHING TV: 2

## 2022-07-28 NOTE — PROGRESS NOTES
PULMONARY OFFICE FOLLOW-UP VISIT    CONSULTING PHYSICIAN:  Uriah Myers MD     REASON FOR VISIT:   Chief Complaint   Patient presents with    Sleep Apnea       DATE OF VISIT: 7/28/2022    HISTORY OF PRESENT ILLNESS: 40y.o. year old male comes into the sleep clinic for a follow-up. Since last clinic visit patient underwent a split-night sleep study which showed severe obstructive sleep apnea which is amenable to CPAP therapy. He has started using the auto CPAP therapy. He reports that there are certain nights when he has taken off his mask unconsciously. He denies any mask leakage or pressure intolerance. Weight has been stable. Previously: Patient reports that for the last several months he has been having poor quality sleep with snoring, gasping, choking. There has been witnessed apneas. Sleeps for about 5 to 6 hours every night and wakes up unrefreshed. Feels sleepy during the day. Weight has been fluctuating. Does have a.m. dry mouth but denies a.m. headaches. Sleep Medicine 7/28/2022 2/16/2022   Sitting and reading 3 3   Watching TV 2 2   Sitting, inactive in a public place (e.g. a theatre or a meeting) 0 1   As a passenger in a car for an hour without a break 3 2   Lying down to rest in the afternoon when circumstances permit 3 3   Sitting and talking to someone 1 1   Sitting quietly after a lunch without alcohol 2 3   In a car, while stopped for a few minutes in traffic 0 1   Total score 14 16       STOP-BANG Questionnaire    Snore Loudly Yes   Often feel Tired/Fatigued/Sleepy Yes   Observed breathing pauses Yes   Blood pressure problems Yes   BMI >35 Kg/m2 Body mass index is 36.8 kg/m². Age>50 40 y.o. Neck Circumference>16 inches      Gender Male? male     Total 6       REVIEW OF SYSTEMS:   8 point review of systems negative except that mentioned in the HPI.     PAST MEDICAL HISTORY:   Past Medical History:   Diagnosis Date    Acquired hypothyroidism 2/17/2022    Asthma on 7/28/2022) 1 Bottle 3    Respiratory Therapy Supplies (NEBULIZER COMPRESSOR) KIT 1 kit by Does not apply route once for 1 dose 1 kit 0     No current facility-administered medications on file prior to visit. ALLERGIES:   Allergies as of 07/28/2022 - Fully Reviewed 07/28/2022   Allergen Reaction Noted    Ibuprofen  05/27/2021      OBJECTIVE:   height is 5' 8\" (1.727 m) and weight is 242 lb (109.8 kg). His blood pressure is 110/72 and his pulse is 103 (abnormal). His oxygen saturation is 97%. PHYSICAL EXAM:    CONSTITUTIONAL: He is a 40y.o.-year-old who appears his stated age. He is alert and oriented x 3 and in no acute distress. HEENT: PERRLA, EOMI. No scleral icterus. No thrush, atraumatic, normocephalic. Mallampati class 3 airway. NECK: Supple, without cervical or supraclavicular lymphadenopathy:  CARDIOVASCULAR: S1 S2 RRR. Without murmer  RESPIRATORY & CHEST: Lungs are clear to auscultation and percussion. No wheezing, no crackles. Good air movement  GASTROINTESTINAL & ABDOMEN: Soft, nontender, positive bowel sounds in all quadrants, non-distended, without hepatosplenomegaly. GENITOURINARY: Deferred. MUSCULOSKELETAL: No tenderness to palpation of the axial skeleton. There is no clubbing. No cyanosis. No edema of the lower extremities. SKIN OF BODY: No rash or jaundice. PSYCHIATRIC EVALUATION: Normal affect. Patient answers questions appropriately. HEMATOLOGIC/LYMPHATIC/ IMMUNOLOGIC: No palpable lymphadenopathy. NEUROLOGIC: Alert and oriented x 3. Groslly non-focal. Motor strength is 5+/5 in all muscle groups. The patient has a normal sensorium globally.       LABS:    Lab Summary Latest Ref Rng & Units 6/17/2022 3/30/2022 3/25/2022   WBC 4.0 - 11.0 K/uL - - 7.2   Hgb 13.5 - 17.5 g/dL - - 14.3   Hct 40.5 - 52.5 % - - 43.7   Platelets 682 - 401 K/uL - - 374   Sodium 136 - 145 mmol/L - 141 133(L)   Potassium 3.5 - 5.1 mmol/L - 4.3 4.3   BUN 7 - 20 mg/dL - 13 12   Creatinine discussed various modalities including moderate intensity intermittent exercises, diet control and bariatric surgery. If the patient loses even 10 to 15% of current body weight, it will be beneficial in improving the overall health. Return in about 6 months (around 1/28/2023) for milan. Leonarda Frank MD  Pulmonary Critical Care and Sleep Medicine  7/28/2022, 2:45 PM    This note was completed using dragon medical speech recognition software. Grammatical errors, random word insertions, pronoun errors and incomplete sentences are occasional consequences of this technology due to software limitations. If there are questions or concerns about the content of this note of information contained within the body of this dictation they should be addressed with the provider for clarification.

## 2022-09-12 ENCOUNTER — TELEPHONE (OUTPATIENT)
Dept: INTERNAL MEDICINE CLINIC | Age: 47
End: 2022-09-12

## 2022-09-12 NOTE — TELEPHONE ENCOUNTER
Needing to reschedule visit due to work schedule [facility] could not do since being seen for a red flag symptom. Appt rescheduled for Patient at this time.

## 2022-09-16 ENCOUNTER — OFFICE VISIT (OUTPATIENT)
Dept: INTERNAL MEDICINE CLINIC | Age: 47
End: 2022-09-16
Payer: COMMERCIAL

## 2022-09-16 VITALS
BODY MASS INDEX: 36.8 KG/M2 | DIASTOLIC BLOOD PRESSURE: 88 MMHG | HEART RATE: 96 BPM | SYSTOLIC BLOOD PRESSURE: 112 MMHG | WEIGHT: 242 LBS | OXYGEN SATURATION: 98 %

## 2022-09-16 DIAGNOSIS — K62.5 RECTAL BLEED: Primary | ICD-10-CM

## 2022-09-16 DIAGNOSIS — Z12.11 COLON CANCER SCREENING: ICD-10-CM

## 2022-09-16 DIAGNOSIS — E03.9 ACQUIRED HYPOTHYROIDISM: ICD-10-CM

## 2022-09-16 DIAGNOSIS — K62.5 RECTAL BLEED: ICD-10-CM

## 2022-09-16 LAB
BASOPHILS ABSOLUTE: 0.1 K/UL (ref 0–0.2)
BASOPHILS RELATIVE PERCENT: 0.9 %
EOSINOPHILS ABSOLUTE: 0.5 K/UL (ref 0–0.6)
EOSINOPHILS RELATIVE PERCENT: 5 %
HCT VFR BLD CALC: 45.1 % (ref 40.5–52.5)
HEMOGLOBIN: 15 G/DL (ref 13.5–17.5)
LYMPHOCYTES ABSOLUTE: 2.7 K/UL (ref 1–5.1)
LYMPHOCYTES RELATIVE PERCENT: 29.5 %
MCH RBC QN AUTO: 28.7 PG (ref 26–34)
MCHC RBC AUTO-ENTMCNC: 33.1 G/DL (ref 31–36)
MCV RBC AUTO: 86.7 FL (ref 80–100)
MONOCYTES ABSOLUTE: 0.5 K/UL (ref 0–1.3)
MONOCYTES RELATIVE PERCENT: 5.2 %
NEUTROPHILS ABSOLUTE: 5.5 K/UL (ref 1.7–7.7)
NEUTROPHILS RELATIVE PERCENT: 59.4 %
PDW BLD-RTO: 15.3 % (ref 12.4–15.4)
PLATELET # BLD: 307 K/UL (ref 135–450)
PMV BLD AUTO: 8 FL (ref 5–10.5)
RBC # BLD: 5.2 M/UL (ref 4.2–5.9)
TSH SERPL DL<=0.05 MIU/L-ACNC: 191.2 UIU/ML (ref 0.27–4.2)
WBC # BLD: 9.2 K/UL (ref 4–11)

## 2022-09-16 PROCEDURE — 1036F TOBACCO NON-USER: CPT | Performed by: INTERNAL MEDICINE

## 2022-09-16 PROCEDURE — G8417 CALC BMI ABV UP PARAM F/U: HCPCS | Performed by: INTERNAL MEDICINE

## 2022-09-16 PROCEDURE — G8427 DOCREV CUR MEDS BY ELIG CLIN: HCPCS | Performed by: INTERNAL MEDICINE

## 2022-09-16 PROCEDURE — 99214 OFFICE O/P EST MOD 30 MIN: CPT | Performed by: INTERNAL MEDICINE

## 2022-09-16 ASSESSMENT — ENCOUNTER SYMPTOMS: HEMATOCHEZIA: 1

## 2022-09-16 NOTE — PROGRESS NOTES
Beltran Mccartney (:  1975) is a 56 y.o. male,Established patient, here for evaluation of the following chief complaint(s):  Stool Color Change (Repeated blood in stool \"the same as before\" last x apx a week ago)         ASSESSMENT/PLAN:  1. Rectal bleed  -     CBC with Auto Differential; Future  2. Colon cancer screening  -     [facility]FL - Hubert Villafana MD, Gastroenterology (ERCP & EUS), [facility]  3. Acquired hypothyroidism  -     TSH; Future  The patient is presenting with mild rectal bleeding that since have resolved he does have a hemorrhoid but given his age he is strongly recommended to have a colonoscopy so a referral has been placed for his screening colonoscopy in the system    The patient needs to have his thyroid function test repeated after his dose adjustment to decide if any further adjustment is needed or if we can continue the same dose  Return if symptoms worsen or fail to improve, for As scheduled.          Subjective   SUBJECTIVE/OBJECTIVE:    Lab Review   Lab Results   Component Value Date/Time     2022 05:12 PM     2022 10:03 AM     2022 04:03 PM    K 4.3 2022 05:12 PM    K 4.3 2022 10:03 AM    K 3.7 2022 04:03 PM    K 4.1 2021 09:22 AM    CO2 19 2022 05:12 PM    CO2 23 2022 10:03 AM    CO2 23 2022 04:03 PM    BUN 13 2022 05:12 PM    BUN 12 2022 10:03 AM    BUN 15 2022 04:03 PM    CREATININE 0.9 2022 05:12 PM    CREATININE 1.0 2022 10:03 AM    CREATININE 1.2 2022 04:03 PM    GLUCOSE 94 2022 05:12 PM    GLUCOSE 93 2022 10:03 AM    GLUCOSE 80 2022 04:03 PM    CALCIUM 9.6 2022 05:12 PM    CALCIUM 9.9 2022 10:03 AM    CALCIUM 9.4 2022 04:03 PM     Lab Results   Component Value Date/Time    WBC 7.2 2022 10:03 AM    WBC 8.6 2022 04:03 PM    WBC 7.5 2022 02:30 PM    HGB 14.3 2022 10:03 AM    HGB 13.6 2022 04:03 PM    HGB 13.6 01/20/2022 02:30 PM    HCT 43.7 03/25/2022 10:03 AM    HCT 41.3 01/20/2022 04:03 PM    HCT 41.7 01/20/2022 02:30 PM    MCV 90.2 03/25/2022 10:03 AM    MCV 93.3 01/20/2022 04:03 PM    MCV 92.4 01/20/2022 02:30 PM     03/25/2022 10:03 AM     01/20/2022 04:03 PM     01/20/2022 02:30 PM     Lab Results   Component Value Date/Time    CHOL 286 01/20/2022 04:03 PM    CHOL 299 01/20/2022 02:30 PM    CHOL 218 07/15/2015 01:31 PM    TRIG 117 01/20/2022 04:03 PM    TRIG 135 01/20/2022 02:30 PM    TRIG 300 07/15/2015 01:31 PM    HDL 39 01/20/2022 04:03 PM    HDL 41 01/20/2022 02:30 PM    HDL 28 07/15/2015 01:31 PM       Vitals 9/16/2022 7/28/2022 4/22/2866   SYSTOLIC 053 993 876   DIASTOLIC 88 72 84   Site - Left Upper Arm -   Position - Sitting -   Cuff Size - Large Adult -   Pulse 96 103 88   Temp - - -   Resp - - -   SpO2 98 97 98   Weight 242 lb 242 lb 241 lb 3.2 oz   Height - 5' 8\" -   Body mass index - 36.79 kg/m2 -   Pain Level - - -   Some recent data might be hidden       Rectal Bleeding   The current episode started more than 2 weeks ago. The onset was gradual. The problem has been resolved. Review of Systems   Gastrointestinal:  Positive for hematochezia. Objective   Physical Exam  Vitals and nursing note reviewed. Constitutional:       General: He is not in acute distress. Appearance: Normal appearance. HENT:      Head: Normocephalic and atraumatic. Right Ear: Tympanic membrane normal.      Left Ear: Tympanic membrane normal.      Nose: Nose normal.   Eyes:      Extraocular Movements: Extraocular movements intact. Conjunctiva/sclera: Conjunctivae normal.      Pupils: Pupils are equal, round, and reactive to light. Neck:      Vascular: No carotid bruit. Cardiovascular:      Rate and Rhythm: Normal rate and regular rhythm. Pulses: Normal pulses. Heart sounds: No murmur heard.   Pulmonary:      Effort: Pulmonary effort is normal. No respiratory distress. Breath sounds: Normal breath sounds. Abdominal:      General: Abdomen is flat. Bowel sounds are normal. There is no distension. Palpations: Abdomen is soft. Tenderness: There is no abdominal tenderness. Musculoskeletal:         General: No swelling, tenderness or deformity. Cervical back: Normal range of motion and neck supple. No rigidity or tenderness. Right lower leg: No edema. Left lower leg: No edema. Lymphadenopathy:      Cervical: No cervical adenopathy. Skin:     Coloration: Skin is not jaundiced. Findings: No bruising, erythema or lesion. Neurological:      General: No focal deficit present. Mental Status: He is alert and oriented to person, place, and time. Cranial Nerves: No cranial nerve deficit. Motor: No weakness. Gait: Gait normal.          This dictation was generated by voice recognition computer software. Although all attempts are made to edit the dictation for accuracy, there may be errors in the transcription that are not intended. An electronic signature was used to authenticate this note.     --Nadege Townsend MD

## 2022-09-19 ENCOUNTER — TELEPHONE (OUTPATIENT)
Dept: INTERNAL MEDICINE CLINIC | Age: 47
End: 2022-09-19

## 2022-09-19 DIAGNOSIS — E03.9 ACQUIRED HYPOTHYROIDISM: ICD-10-CM

## 2022-09-19 RX ORDER — LEVOTHYROXINE SODIUM 175 UG/1
175 TABLET ORAL DAILY
Qty: 30 TABLET | Refills: 2 | Status: SHIPPED | OUTPATIENT
Start: 2022-09-19

## 2022-09-19 NOTE — RESULT ENCOUNTER NOTE
Please advise the patient that I have increased his dose to 175 and I need him to recheck his blood test in about 4 weeks orders are in the system

## 2022-09-19 NOTE — TELEPHONE ENCOUNTER
Message sent to patient on Mychart unable to reach upon returning phone call with medication change information and notification of retesting labs ordered.

## 2022-09-19 NOTE — TELEPHONE ENCOUNTER
Spoke with patient informed of increased synthroid dose and order placed for labs in about 4 weeks w new dose.

## 2022-09-19 NOTE — TELEPHONE ENCOUNTER
Pt calling spoke to someone this morning but has some more questions about further testing---please call the pt. Thanks.

## 2022-09-20 ENCOUNTER — TELEPHONE (OUTPATIENT)
Dept: PULMONOLOGY | Age: 47
End: 2022-09-20

## 2022-09-20 NOTE — TELEPHONE ENCOUNTER
Pt called and wants to know if we sent order for a new mask for his cpap machine. He said the last time he talked to doctor he said he would send a new order for a new mask.      # [phone]

## 2022-09-20 NOTE — TELEPHONE ENCOUNTER
Order resent to DME. Patient just needs to contact [employer] to set up an appt.

## 2022-09-26 RX ORDER — VENLAFAXINE HYDROCHLORIDE 75 MG/1
CAPSULE, EXTENDED RELEASE ORAL
Qty: 30 CAPSULE | Refills: 3 | Status: SHIPPED | OUTPATIENT
Start: 2022-09-26

## 2022-10-31 NOTE — TELEPHONE ENCOUNTER
appt made for his LT foot, per his request. He said he will discuss PT at that time. Erivedge Counseling- I discussed with the patient the risks of Erivedge including but not limited to nausea, vomiting, diarrhea, constipation, weight loss, changes in the sense of taste, decreased appetite, muscle spasms, and hair loss.  The patient verbalized understanding of the proper use and possible adverse effects of Erivedge.  All of the patient's questions and concerns were addressed.

## 2022-12-09 DIAGNOSIS — E03.9 ACQUIRED HYPOTHYROIDISM: ICD-10-CM

## 2022-12-09 RX ORDER — LEVOTHYROXINE SODIUM 175 UG/1
TABLET ORAL
Qty: 30 TABLET | Refills: 2 | Status: SHIPPED | OUTPATIENT
Start: 2022-12-09

## 2023-01-13 ENCOUNTER — OFFICE VISIT (OUTPATIENT)
Dept: PULMONOLOGY | Age: 48
End: 2023-01-13
Payer: COMMERCIAL

## 2023-01-13 VITALS
OXYGEN SATURATION: 96 % | WEIGHT: 245 LBS | SYSTOLIC BLOOD PRESSURE: 110 MMHG | HEART RATE: 103 BPM | DIASTOLIC BLOOD PRESSURE: 82 MMHG | HEIGHT: 68 IN | BODY MASS INDEX: 37.13 KG/M2

## 2023-01-13 DIAGNOSIS — G47.33 OSA (OBSTRUCTIVE SLEEP APNEA): Primary | ICD-10-CM

## 2023-01-13 DIAGNOSIS — J45.30 MILD PERSISTENT ASTHMA WITHOUT COMPLICATION: ICD-10-CM

## 2023-01-13 DIAGNOSIS — E66.01 CLASS 2 SEVERE OBESITY DUE TO EXCESS CALORIES WITH SERIOUS COMORBIDITY AND BODY MASS INDEX (BMI) OF 36.0 TO 36.9 IN ADULT (HCC): ICD-10-CM

## 2023-01-13 PROCEDURE — 1036F TOBACCO NON-USER: CPT | Performed by: INTERNAL MEDICINE

## 2023-01-13 PROCEDURE — G8427 DOCREV CUR MEDS BY ELIG CLIN: HCPCS | Performed by: INTERNAL MEDICINE

## 2023-01-13 PROCEDURE — G8417 CALC BMI ABV UP PARAM F/U: HCPCS | Performed by: INTERNAL MEDICINE

## 2023-01-13 PROCEDURE — 99214 OFFICE O/P EST MOD 30 MIN: CPT | Performed by: INTERNAL MEDICINE

## 2023-01-13 PROCEDURE — G8484 FLU IMMUNIZE NO ADMIN: HCPCS | Performed by: INTERNAL MEDICINE

## 2023-01-13 ASSESSMENT — SLEEP AND FATIGUE QUESTIONNAIRES
HOW LIKELY ARE YOU TO NOD OFF OR FALL ASLEEP IN A CAR, WHILE STOPPED FOR A FEW MINUTES IN TRAFFIC: 0
HOW LIKELY ARE YOU TO NOD OFF OR FALL ASLEEP WHILE SITTING INACTIVE IN A PUBLIC PLACE: 2
HOW LIKELY ARE YOU TO NOD OFF OR FALL ASLEEP WHILE SITTING AND TALKING TO SOMEONE: 0
HOW LIKELY ARE YOU TO NOD OFF OR FALL ASLEEP WHEN YOU ARE A PASSENGER IN A CAR FOR AN HOUR WITHOUT A BREAK: 2
HOW LIKELY ARE YOU TO NOD OFF OR FALL ASLEEP WHILE SITTING AND READING: 3
HOW LIKELY ARE YOU TO NOD OFF OR FALL ASLEEP WHILE SITTING QUIETLY AFTER LUNCH WITHOUT ALCOHOL: 1
HOW LIKELY ARE YOU TO NOD OFF OR FALL ASLEEP WHILE LYING DOWN TO REST IN THE AFTERNOON WHEN CIRCUMSTANCES PERMIT: 2
ESS TOTAL SCORE: 12
HOW LIKELY ARE YOU TO NOD OFF OR FALL ASLEEP WHILE WATCHING TV: 2
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
## 2023-01-16 RX ORDER — VENLAFAXINE HYDROCHLORIDE 75 MG/1
CAPSULE, EXTENDED RELEASE ORAL
Qty: 30 CAPSULE | Refills: 1 | Status: SHIPPED | OUTPATIENT
Start: 2023-01-16 | End: 2023-02-27

## 2023-01-19 DIAGNOSIS — E55.9 VITAMIN D DEFICIENCY: ICD-10-CM

## 2023-01-20 RX ORDER — ERGOCALCIFEROL 1.25 MG/1
CAPSULE ORAL
Qty: 4 CAPSULE | Refills: 5 | Status: SHIPPED | OUTPATIENT
Start: 2023-01-20

## 2023-02-25 DIAGNOSIS — E03.9 ACQUIRED HYPOTHYROIDISM: ICD-10-CM

## 2023-02-27 RX ORDER — VENLAFAXINE HYDROCHLORIDE 75 MG/1
CAPSULE, EXTENDED RELEASE ORAL
Qty: 30 CAPSULE | Refills: 1 | Status: SHIPPED | OUTPATIENT
Start: 2023-02-27

## 2023-02-27 RX ORDER — LEVOTHYROXINE SODIUM 175 UG/1
TABLET ORAL
Qty: 30 TABLET | Refills: 2 | Status: SHIPPED | OUTPATIENT
Start: 2023-02-27

## 2023-05-02 RX ORDER — VENLAFAXINE HYDROCHLORIDE 75 MG/1
CAPSULE, EXTENDED RELEASE ORAL
Qty: 30 CAPSULE | Refills: 0 | Status: SHIPPED | OUTPATIENT
Start: 2023-05-02

## 2023-05-23 DIAGNOSIS — E03.9 ACQUIRED HYPOTHYROIDISM: ICD-10-CM

## 2023-05-23 RX ORDER — LEVOTHYROXINE SODIUM 175 UG/1
TABLET ORAL
Qty: 30 TABLET | Refills: 2 | Status: SHIPPED | OUTPATIENT
Start: 2023-05-23

## 2023-05-23 NOTE — TELEPHONE ENCOUNTER
Last OV: 9/16/2022  Next OV: Visit date not found    Next appointment due:    Last fill:2/27/23  Refills:2

## 2023-05-30 RX ORDER — VENLAFAXINE HYDROCHLORIDE 75 MG/1
CAPSULE, EXTENDED RELEASE ORAL
Qty: 30 CAPSULE | Refills: 0 | OUTPATIENT
Start: 2023-05-30

## 2023-07-14 ENCOUNTER — OFFICE VISIT (OUTPATIENT)
Dept: INTERNAL MEDICINE CLINIC | Age: 48
End: 2023-07-14
Payer: COMMERCIAL

## 2023-07-14 VITALS
BODY MASS INDEX: 36.95 KG/M2 | HEART RATE: 77 BPM | HEIGHT: 68 IN | WEIGHT: 243.8 LBS | SYSTOLIC BLOOD PRESSURE: 118 MMHG | DIASTOLIC BLOOD PRESSURE: 80 MMHG | OXYGEN SATURATION: 96 %

## 2023-07-14 DIAGNOSIS — F33.1 MODERATE EPISODE OF RECURRENT MAJOR DEPRESSIVE DISORDER (HCC): ICD-10-CM

## 2023-07-14 DIAGNOSIS — E03.9 ACQUIRED HYPOTHYROIDISM: ICD-10-CM

## 2023-07-14 DIAGNOSIS — F33.0 MAJOR DEPRESSIVE DISORDER, RECURRENT, MILD (HCC): ICD-10-CM

## 2023-07-14 DIAGNOSIS — Z13.220 SCREENING FOR CHOLESTEROL LEVEL: ICD-10-CM

## 2023-07-14 DIAGNOSIS — F33.1 MAJOR DEPRESSIVE DISORDER, RECURRENT, MODERATE (HCC): ICD-10-CM

## 2023-07-14 DIAGNOSIS — F32.0 MAJOR DEPRESSIVE DISORDER, SINGLE EPISODE, MILD (HCC): ICD-10-CM

## 2023-07-14 DIAGNOSIS — Z00.00 PREVENTATIVE HEALTH CARE: ICD-10-CM

## 2023-07-14 DIAGNOSIS — Z00.00 PREVENTATIVE HEALTH CARE: Primary | ICD-10-CM

## 2023-07-14 DIAGNOSIS — R73.03 PREDIABETES: ICD-10-CM

## 2023-07-14 PROBLEM — F33.9 MAJOR DEPRESSIVE DISORDER, RECURRENT, UNSPECIFIED (HCC): Status: ACTIVE | Noted: 2023-07-14

## 2023-07-14 PROCEDURE — 99396 PREV VISIT EST AGE 40-64: CPT | Performed by: INTERNAL MEDICINE

## 2023-07-14 RX ORDER — VENLAFAXINE HYDROCHLORIDE 75 MG/1
CAPSULE, EXTENDED RELEASE ORAL
Qty: 30 CAPSULE | Refills: 5 | Status: SHIPPED | OUTPATIENT
Start: 2023-07-14

## 2023-07-14 RX ORDER — LEVOTHYROXINE SODIUM 175 UG/1
175 TABLET ORAL DAILY
Qty: 30 TABLET | Refills: 5 | Status: SHIPPED | OUTPATIENT
Start: 2023-07-14

## 2023-07-14 SDOH — ECONOMIC STABILITY: HOUSING INSECURITY
IN THE LAST 12 MONTHS, WAS THERE A TIME WHEN YOU DID NOT HAVE A STEADY PLACE TO SLEEP OR SLEPT IN A SHELTER (INCLUDING NOW)?: NO

## 2023-07-14 SDOH — ECONOMIC STABILITY: FOOD INSECURITY: WITHIN THE PAST 12 MONTHS, THE FOOD YOU BOUGHT JUST DIDN'T LAST AND YOU DIDN'T HAVE MONEY TO GET MORE.: NEVER TRUE

## 2023-07-14 SDOH — ECONOMIC STABILITY: FOOD INSECURITY: WITHIN THE PAST 12 MONTHS, YOU WORRIED THAT YOUR FOOD WOULD RUN OUT BEFORE YOU GOT MONEY TO BUY MORE.: NEVER TRUE

## 2023-07-14 SDOH — ECONOMIC STABILITY: INCOME INSECURITY: HOW HARD IS IT FOR YOU TO PAY FOR THE VERY BASICS LIKE FOOD, HOUSING, MEDICAL CARE, AND HEATING?: NOT VERY HARD

## 2023-07-14 ASSESSMENT — ENCOUNTER SYMPTOMS
HOARSE VOICE: 0
CONSTIPATION: 0

## 2023-07-14 ASSESSMENT — PATIENT HEALTH QUESTIONNAIRE - PHQ9
SUM OF ALL RESPONSES TO PHQ QUESTIONS 1-9: 3
5. POOR APPETITE OR OVEREATING: 0
6. FEELING BAD ABOUT YOURSELF - OR THAT YOU ARE A FAILURE OR HAVE LET YOURSELF OR YOUR FAMILY DOWN: 0
SUM OF ALL RESPONSES TO PHQ9 QUESTIONS 1 & 2: 1
7. TROUBLE CONCENTRATING ON THINGS, SUCH AS READING THE NEWSPAPER OR WATCHING TELEVISION: 1
SUM OF ALL RESPONSES TO PHQ QUESTIONS 1-9: 3
2. FEELING DOWN, DEPRESSED OR HOPELESS: 1
10. IF YOU CHECKED OFF ANY PROBLEMS, HOW DIFFICULT HAVE THESE PROBLEMS MADE IT FOR YOU TO DO YOUR WORK, TAKE CARE OF THINGS AT HOME, OR GET ALONG WITH OTHER PEOPLE: 0
9. THOUGHTS THAT YOU WOULD BE BETTER OFF DEAD, OR OF HURTING YOURSELF: 0
8. MOVING OR SPEAKING SO SLOWLY THAT OTHER PEOPLE COULD HAVE NOTICED. OR THE OPPOSITE, BEING SO FIGETY OR RESTLESS THAT YOU HAVE BEEN MOVING AROUND A LOT MORE THAN USUAL: 0
SUM OF ALL RESPONSES TO PHQ QUESTIONS 1-9: 3
3. TROUBLE FALLING OR STAYING ASLEEP: 0
1. LITTLE INTEREST OR PLEASURE IN DOING THINGS: 0
4. FEELING TIRED OR HAVING LITTLE ENERGY: 1
SUM OF ALL RESPONSES TO PHQ QUESTIONS 1-9: 3

## 2023-07-14 NOTE — PROGRESS NOTES
Cristina Colon (:  1975) is a 56 y.o. male,Established patient, here for evaluation of the following chief complaint(s):  Follow-up         ASSESSMENT/PLAN:  1. Preventative health care  -     CBC with Auto Differential; Future  -     Hemoglobin A1C; Future  -     Comprehensive Metabolic Panel; Future  2. Major depressive disorder, single episode, mild (HCC)  -     venlafaxine (EFFEXOR XR) 75 MG extended release capsule; TAKE 1 CAPSULE BY MOUTH EVERY DAY, Disp-30 capsule, R-5Normal  3. Major depressive disorder, recurrent, mild  4. Major depressive disorder, recurrent, moderate  5. Moderate episode of recurrent major depressive disorder ([address])  6. Prediabetes  7. Acquired hypothyroidism  -     TSH; Future  -     levothyroxine (SYNTHROID) 175 MCG tablet; Take 1 tablet by mouth daily, Disp-30 tablet, R-5Normal  8. Screening for cholesterol level  -     Lipid Panel; Future  Patient seems to be doing fairly well he is stating he is taking his medications specifically the thyroid every days we can see what his thyroid function test is like today and decide if any adjustment to the dose is needed    The patient from a depression standpoint is doing fairly well he is not suicidal or have any plan to harm himself or anyone else we can continue the same medication monitor clinically    Patient had to cancel his colonoscopy once we did discuss the need to have that done and he is going to try and get that completed    Again check the patient blood test today and make sure that all his other metrics are normal    No follow-ups on file.          Subjective   SUBJECTIVE/OBJECTIVE:    Lab Review   Lab Results   Component Value Date/Time     2022 05:12 PM     2022 10:03 AM     2022 04:03 PM    K 4.3 2022 05:12 PM    K 4.3 2022 10:03 AM    K 3.7 2022 04:03 PM    K 4.1 2021 09:22 AM    CO2 19 2022 05:12 PM    CO2 23 2022 10:03 AM    CO2 23 2022

## 2023-07-15 LAB
ALBUMIN SERPL-MCNC: 4.4 G/DL (ref 3.4–5)
ALBUMIN/GLOB SERPL: 1.3 {RATIO} (ref 1.1–2.2)
ALP SERPL-CCNC: 150 U/L (ref 40–129)
ALT SERPL-CCNC: 27 U/L (ref 10–40)
ANION GAP SERPL CALCULATED.3IONS-SCNC: 11 MMOL/L (ref 3–16)
AST SERPL-CCNC: 26 U/L (ref 15–37)
BASOPHILS # BLD: 0.1 K/UL (ref 0–0.2)
BASOPHILS NFR BLD: 0.5 %
BILIRUB SERPL-MCNC: 0.4 MG/DL (ref 0–1)
BUN SERPL-MCNC: 10 MG/DL (ref 7–20)
BURR CELLS BLD QL SMEAR: ABNORMAL
CALCIUM SERPL-MCNC: 9.5 MG/DL (ref 8.3–10.6)
CHLORIDE SERPL-SCNC: 105 MMOL/L (ref 99–110)
CHOLEST SERPL-MCNC: 221 MG/DL (ref 0–199)
CO2 SERPL-SCNC: 24 MMOL/L (ref 21–32)
CREAT SERPL-MCNC: 0.9 MG/DL (ref 0.9–1.3)
DEPRECATED RDW RBC AUTO: 14.3 % (ref 12.4–15.4)
EOSINOPHIL # BLD: 0.5 K/UL (ref 0–0.6)
EOSINOPHIL NFR BLD: 4.8 %
EST. AVERAGE GLUCOSE BLD GHB EST-MCNC: 114 MG/DL
GFR SERPLBLD CREATININE-BSD FMLA CKD-EPI: >60 ML/MIN/{1.73_M2}
GLUCOSE SERPL-MCNC: 88 MG/DL (ref 70–99)
HBA1C MFR BLD: 5.6 %
HCT VFR BLD AUTO: 48.3 % (ref 40.5–52.5)
HDLC SERPL-MCNC: 34 MG/DL (ref 40–60)
HGB BLD-MCNC: 15.2 G/DL (ref 13.5–17.5)
LDLC SERPL CALC-MCNC: 157 MG/DL
LYMPHOCYTES # BLD: 3.3 K/UL (ref 1–5.1)
LYMPHOCYTES NFR BLD: 29.4 %
MCH RBC QN AUTO: 27.9 PG (ref 26–34)
MCHC RBC AUTO-ENTMCNC: 31.5 G/DL (ref 31–36)
MCV RBC AUTO: 88.5 FL (ref 80–100)
MONOCYTES # BLD: 0.7 K/UL (ref 0–1.3)
MONOCYTES NFR BLD: 6.7 %
NEUTROPHILS # BLD: 6.5 K/UL (ref 1.7–7.7)
NEUTROPHILS NFR BLD: 58.6 %
PLATELET # BLD AUTO: 292 K/UL (ref 135–450)
PLATELET BLD QL SMEAR: ADEQUATE
PMV BLD AUTO: 8.1 FL (ref 5–10.5)
POIKILOCYTOSIS BLD QL SMEAR: ABNORMAL
POTASSIUM SERPL-SCNC: 4.3 MMOL/L (ref 3.5–5.1)
PROT SERPL-MCNC: 7.9 G/DL (ref 6.4–8.2)
RBC # BLD AUTO: 5.46 M/UL (ref 4.2–5.9)
SLIDE REVIEW: ABNORMAL
SODIUM SERPL-SCNC: 140 MMOL/L (ref 136–145)
TRIGL SERPL-MCNC: 149 MG/DL (ref 0–150)
TSH SERPL DL<=0.005 MIU/L-ACNC: 0.16 UIU/ML (ref 0.27–4.2)
VLDLC SERPL CALC-MCNC: 30 MG/DL
WBC # BLD AUTO: 11.1 K/UL (ref 4–11)

## 2023-07-17 NOTE — RESULT ENCOUNTER NOTE
Please let the patient know that results were acceptable please have the patient continue the same dose of his Synthroid

## 2024-01-03 DIAGNOSIS — F32.0 MAJOR DEPRESSIVE DISORDER, SINGLE EPISODE, MILD (HCC): ICD-10-CM

## 2024-01-03 RX ORDER — VENLAFAXINE HYDROCHLORIDE 75 MG/1
CAPSULE, EXTENDED RELEASE ORAL
Qty: 30 CAPSULE | Refills: 5 | OUTPATIENT
Start: 2024-01-03

## 2024-01-15 ENCOUNTER — OFFICE VISIT (OUTPATIENT)
Dept: INTERNAL MEDICINE CLINIC | Age: 49
End: 2024-01-15
Payer: COMMERCIAL

## 2024-01-15 VITALS
SYSTOLIC BLOOD PRESSURE: 118 MMHG | DIASTOLIC BLOOD PRESSURE: 76 MMHG | BODY MASS INDEX: 37.89 KG/M2 | OXYGEN SATURATION: 96 % | HEART RATE: 105 BPM | WEIGHT: 249.2 LBS

## 2024-01-15 DIAGNOSIS — E78.2 MIXED HYPERLIPIDEMIA: ICD-10-CM

## 2024-01-15 DIAGNOSIS — F33.1 MAJOR DEPRESSIVE DISORDER, RECURRENT, MODERATE (HCC): ICD-10-CM

## 2024-01-15 DIAGNOSIS — K21.00 GASTROESOPHAGEAL REFLUX DISEASE WITH ESOPHAGITIS WITHOUT HEMORRHAGE: ICD-10-CM

## 2024-01-15 DIAGNOSIS — F32.0 MAJOR DEPRESSIVE DISORDER, SINGLE EPISODE, MILD (HCC): ICD-10-CM

## 2024-01-15 DIAGNOSIS — E03.9 ACQUIRED HYPOTHYROIDISM: ICD-10-CM

## 2024-01-15 DIAGNOSIS — E03.9 ACQUIRED HYPOTHYROIDISM: Primary | ICD-10-CM

## 2024-01-15 DIAGNOSIS — J45.30 MILD PERSISTENT ASTHMA WITHOUT COMPLICATION: ICD-10-CM

## 2024-01-15 PROCEDURE — 99214 OFFICE O/P EST MOD 30 MIN: CPT | Performed by: INTERNAL MEDICINE

## 2024-01-15 RX ORDER — SUCRALFATE 1 G/1
1 TABLET ORAL 3 TIMES DAILY PRN
Qty: 150 TABLET | Refills: 1 | Status: SHIPPED | OUTPATIENT
Start: 2024-01-15

## 2024-01-15 RX ORDER — VENLAFAXINE HYDROCHLORIDE 75 MG/1
CAPSULE, EXTENDED RELEASE ORAL
Qty: 30 CAPSULE | Refills: 5 | Status: SHIPPED | OUTPATIENT
Start: 2024-01-15

## 2024-01-15 ASSESSMENT — PATIENT HEALTH QUESTIONNAIRE - PHQ9
3. TROUBLE FALLING OR STAYING ASLEEP: NOT AT ALL
7. TROUBLE CONCENTRATING ON THINGS, SUCH AS READING THE NEWSPAPER OR WATCHING TELEVISION: 0
4. FEELING TIRED OR HAVING LITTLE ENERGY: 1
SUM OF ALL RESPONSES TO PHQ QUESTIONS 1-9: 3
3. TROUBLE FALLING OR STAYING ASLEEP: 0
9. THOUGHTS THAT YOU WOULD BE BETTER OFF DEAD, OR OF HURTING YOURSELF: 0
6. FEELING BAD ABOUT YOURSELF - OR THAT YOU ARE A FAILURE OR HAVE LET YOURSELF OR YOUR FAMILY DOWN: 0
2. FEELING DOWN, DEPRESSED OR HOPELESS: 1
5. POOR APPETITE OR OVEREATING: 0
9. THOUGHTS THAT YOU WOULD BE BETTER OFF DEAD, OR OF HURTING YOURSELF: NOT AT ALL
10. IF YOU CHECKED OFF ANY PROBLEMS, HOW DIFFICULT HAVE THESE PROBLEMS MADE IT FOR YOU TO DO YOUR WORK, TAKE CARE OF THINGS AT HOME, OR GET ALONG WITH OTHER PEOPLE: NOT DIFFICULT AT ALL
SUM OF ALL RESPONSES TO PHQ9 QUESTIONS 1 & 2: 2
1. LITTLE INTEREST OR PLEASURE IN DOING THINGS: SEVERAL DAYS
SUM OF ALL RESPONSES TO PHQ QUESTIONS 1-9: 3
4. FEELING TIRED OR HAVING LITTLE ENERGY: SEVERAL DAYS
10. IF YOU CHECKED OFF ANY PROBLEMS, HOW DIFFICULT HAVE THESE PROBLEMS MADE IT FOR YOU TO DO YOUR WORK, TAKE CARE OF THINGS AT HOME, OR GET ALONG WITH OTHER PEOPLE: 0
SUM OF ALL RESPONSES TO PHQ QUESTIONS 1-9: 3
7. TROUBLE CONCENTRATING ON THINGS, SUCH AS READING THE NEWSPAPER OR WATCHING TELEVISION: NOT AT ALL
8. MOVING OR SPEAKING SO SLOWLY THAT OTHER PEOPLE COULD HAVE NOTICED. OR THE OPPOSITE - BEING SO FIDGETY OR RESTLESS THAT YOU HAVE BEEN MOVING AROUND A LOT MORE THAN USUAL: NOT AT ALL
8. MOVING OR SPEAKING SO SLOWLY THAT OTHER PEOPLE COULD HAVE NOTICED. OR THE OPPOSITE, BEING SO FIGETY OR RESTLESS THAT YOU HAVE BEEN MOVING AROUND A LOT MORE THAN USUAL: 0
6. FEELING BAD ABOUT YOURSELF - OR THAT YOU ARE A FAILURE OR HAVE LET YOURSELF OR YOUR FAMILY DOWN: NOT AT ALL
SUM OF ALL RESPONSES TO PHQ QUESTIONS 1-9: 3
5. POOR APPETITE OR OVEREATING: NOT AT ALL
SUM OF ALL RESPONSES TO PHQ QUESTIONS 1-9: 3
1. LITTLE INTEREST OR PLEASURE IN DOING THINGS: 1
2. FEELING DOWN, DEPRESSED OR HOPELESS: SEVERAL DAYS

## 2024-01-15 ASSESSMENT — ENCOUNTER SYMPTOMS
VISUAL CHANGE: 0
HOARSE VOICE: 0

## 2024-01-15 NOTE — PROGRESS NOTES
Sahil Fenton (:  1975) is a 48 y.o. male,Established patient, here for evaluation of the following chief complaint(s):  Depression and Hypothyroidism         ASSESSMENT/PLAN:  1. Acquired hypothyroidism  -     TSH; Future  2. Major depressive disorder, single episode, mild (HCC)  -     venlafaxine (EFFEXOR XR) 75 MG extended release capsule; TAKE 1 CAPSULE BY MOUTH EVERY DAY, Disp-30 capsule, R-5Normal  -     CBC with Auto Differential; Future  -     Comprehensive Metabolic Panel; Future  3. Major depressive disorder, recurrent, moderate (HCC)  4. Mild persistent asthma without complication  5. Mixed hyperlipidemia  -     Lipid Panel; Future  6. Gastroesophageal reflux disease with esophagitis without hemorrhage  -     sucralfate (CARAFATE) 1 GM tablet; Take 1 tablet by mouth 3 times daily as needed (GERD) As needed., Disp-150 tablet, R-1Normal  -     CBC with Auto Differential; Future  -     Comprehensive Metabolic Panel; Future    Overall the patient is doing fairly well we will refill his medication for the depression he is trying to change his job and that is causing him some stress    Will marina have the patient repeat his thyroid function test as well as his blood counts today and decide if adjustment to his dosage is needed or not  Return in about 6 months (around 7/15/2024).         Subjective   SUBJECTIVE/OBJECTIVE:    Lab Review   Lab Results   Component Value Date/Time     2023 04:09 PM     2022 05:12 PM     2022 10:03 AM    K 4.3 2023 04:09 PM    K 4.3 2022 05:12 PM    K 4.3 2022 10:03 AM    K 4.1 2021 09:22 AM    CO2 24 2023 04:09 PM    CO2 19 2022 05:12 PM    CO2 23 2022 10:03 AM    BUN 10 2023 04:09 PM    BUN 13 2022 05:12 PM    BUN 12 2022 10:03 AM    CREATININE 0.9 2023 04:09 PM    CREATININE 0.9 2022 05:12 PM    CREATININE 1.0 2022 10:03 AM    GLUCOSE 88 2023 04:09

## 2024-01-16 LAB
ALBUMIN SERPL-MCNC: 4.4 G/DL (ref 3.4–5)
ALBUMIN/GLOB SERPL: 1.2 {RATIO} (ref 1.1–2.2)
ALP SERPL-CCNC: 181 U/L (ref 40–129)
ALT SERPL-CCNC: 41 U/L (ref 10–40)
ANION GAP SERPL CALCULATED.3IONS-SCNC: 14 MMOL/L (ref 3–16)
AST SERPL-CCNC: 38 U/L (ref 15–37)
BASOPHILS # BLD: 0.1 K/UL (ref 0–0.2)
BASOPHILS NFR BLD: 0.8 %
BILIRUB SERPL-MCNC: 0.4 MG/DL (ref 0–1)
BUN SERPL-MCNC: 13 MG/DL (ref 7–20)
CALCIUM SERPL-MCNC: 8.9 MG/DL (ref 8.3–10.6)
CHLORIDE SERPL-SCNC: 100 MMOL/L (ref 99–110)
CHOLEST SERPL-MCNC: 195 MG/DL (ref 0–199)
CO2 SERPL-SCNC: 21 MMOL/L (ref 21–32)
CREAT SERPL-MCNC: 0.9 MG/DL (ref 0.9–1.3)
DEPRECATED RDW RBC AUTO: 13.8 % (ref 12.4–15.4)
EOSINOPHIL # BLD: 0.1 K/UL (ref 0–0.6)
EOSINOPHIL NFR BLD: 1.4 %
GFR SERPLBLD CREATININE-BSD FMLA CKD-EPI: >60 ML/MIN/{1.73_M2}
GLUCOSE SERPL-MCNC: 79 MG/DL (ref 70–99)
HCT VFR BLD AUTO: 48.3 % (ref 40.5–52.5)
HDLC SERPL-MCNC: 23 MG/DL (ref 40–60)
HGB BLD-MCNC: 16.2 G/DL (ref 13.5–17.5)
LDLC SERPL CALC-MCNC: 122 MG/DL
LYMPHOCYTES # BLD: 5 K/UL (ref 1–5.1)
LYMPHOCYTES NFR BLD: 49.6 %
MCH RBC QN AUTO: 29.6 PG (ref 26–34)
MCHC RBC AUTO-ENTMCNC: 33.6 G/DL (ref 31–36)
MCV RBC AUTO: 88 FL (ref 80–100)
MONOCYTES # BLD: 0.9 K/UL (ref 0–1.3)
MONOCYTES NFR BLD: 9.2 %
NEUTROPHILS # BLD: 3.9 K/UL (ref 1.7–7.7)
NEUTROPHILS NFR BLD: 39 %
PLATELET # BLD AUTO: 266 K/UL (ref 135–450)
PMV BLD AUTO: 8.3 FL (ref 5–10.5)
POTASSIUM SERPL-SCNC: 4 MMOL/L (ref 3.5–5.1)
PROT SERPL-MCNC: 8.1 G/DL (ref 6.4–8.2)
RBC # BLD AUTO: 5.49 M/UL (ref 4.2–5.9)
SODIUM SERPL-SCNC: 135 MMOL/L (ref 136–145)
TRIGL SERPL-MCNC: 250 MG/DL (ref 0–150)
TSH SERPL DL<=0.005 MIU/L-ACNC: 0.17 UIU/ML (ref 0.27–4.2)
VLDLC SERPL CALC-MCNC: 50 MG/DL
WBC # BLD AUTO: 10 K/UL (ref 4–11)

## 2024-01-17 NOTE — RESULT ENCOUNTER NOTE
Please advise patient that the results were acceptable with the exception of the cholesterol and sugar being elevated, the patient is advised to work on aggressive lifestyle modification including different food choices as well as lowering the fat content of the diet in addition to daily exercises with a minimum of 150 minutes/week in 10-minute increments of mild to moderate exercise like brisk walking    The 10-year ASCVD risk score (Tommy PETERSON, et al., 2019) is: 5.2%    Values used to calculate the score:      Age: 48 years      Sex: Male      Is Non- : No      Diabetic: No      Tobacco smoker: No      Systolic Blood Pressure: 118 mmHg      Is BP treated: No      HDL Cholesterol: 23 mg/dL      Total Cholesterol: 195 mg/dL

## 2024-01-29 RX ORDER — LEVOTHYROXINE SODIUM 0.12 MG/1
125 TABLET ORAL DAILY
Qty: 90 TABLET | Refills: 1 | Status: SHIPPED | OUTPATIENT
Start: 2024-01-29

## 2024-01-29 NOTE — TELEPHONE ENCOUNTER
Last OV: 1/15/2024  Next OV: 7/15/2024    Next appointment due:(around 7/15/2024)     Last fill:7/14/23  Refills:5

## 2024-03-04 ENCOUNTER — TELEPHONE (OUTPATIENT)
Dept: INTERNAL MEDICINE CLINIC | Age: 49
End: 2024-03-04

## 2024-03-04 NOTE — TELEPHONE ENCOUNTER
Pt calling, states he has had a \"bad taste\" in his mouth for the past week- no other side effects. Asks if any of his medications could be causing that. Please advise and call pt.

## 2024-03-04 NOTE — TELEPHONE ENCOUNTER
Some medications can do that but potentially only medication he started in the last 2 to 4 weeks, if he suspects that it is the Carafate he can hold it for a week and see

## 2024-05-15 ENCOUNTER — APPOINTMENT (OUTPATIENT)
Dept: GENERAL RADIOLOGY | Age: 49
End: 2024-05-15
Payer: COMMERCIAL

## 2024-05-15 ENCOUNTER — HOSPITAL ENCOUNTER (EMERGENCY)
Age: 49
Discharge: HOME | End: 2024-05-15
Payer: COMMERCIAL

## 2024-05-15 VITALS
WEIGHT: 240 LBS | DIASTOLIC BLOOD PRESSURE: 94 MMHG | BODY MASS INDEX: 36.49 KG/M2 | SYSTOLIC BLOOD PRESSURE: 137 MMHG | RESPIRATION RATE: 16 BRPM | TEMPERATURE: 98 F | HEART RATE: 78 BPM | OXYGEN SATURATION: 95 %

## 2024-05-15 DIAGNOSIS — R07.89 CHEST WALL PAIN: Primary | ICD-10-CM

## 2024-05-15 LAB
ALBUMIN SERPL-MCNC: 4.1 G/DL (ref 3.4–5)
ALBUMIN/GLOB SERPL: 1.1 {RATIO} (ref 1.1–2.2)
ALP SERPL-CCNC: 119 U/L (ref 40–129)
ALT SERPL-CCNC: 24 U/L (ref 10–40)
ANION GAP SERPL CALCULATED.3IONS-SCNC: 12 MMOL/L (ref 3–16)
AST SERPL-CCNC: 32 U/L (ref 15–37)
BASOPHILS # BLD: 0.1 K/UL (ref 0–0.2)
BASOPHILS NFR BLD: 1.1 %
BILIRUB SERPL-MCNC: 0.4 MG/DL (ref 0–1)
BUN SERPL-MCNC: 17 MG/DL (ref 7–20)
CALCIUM SERPL-MCNC: 9.2 MG/DL (ref 8.3–10.6)
CHLORIDE SERPL-SCNC: 102 MMOL/L (ref 99–110)
CO2 SERPL-SCNC: 23 MMOL/L (ref 21–32)
CREAT SERPL-MCNC: 0.8 MG/DL (ref 0.9–1.3)
D-DIMER QUANTITATIVE: 0.3 UG/ML FEU (ref 0–0.6)
DEPRECATED RDW RBC AUTO: 15.1 % (ref 12.4–15.4)
EOSINOPHIL # BLD: 0.5 K/UL (ref 0–0.6)
EOSINOPHIL NFR BLD: 4.5 %
GFR SERPLBLD CREATININE-BSD FMLA CKD-EPI: >90 ML/MIN/{1.73_M2}
GLUCOSE SERPL-MCNC: 90 MG/DL (ref 70–99)
HCT VFR BLD AUTO: 48.3 % (ref 40.5–52.5)
HGB BLD-MCNC: 15.8 G/DL (ref 13.5–17.5)
LYMPHOCYTES # BLD: 3.8 K/UL (ref 1–5.1)
LYMPHOCYTES NFR BLD: 36.1 %
MCH RBC QN AUTO: 29 PG (ref 26–34)
MCHC RBC AUTO-ENTMCNC: 32.7 G/DL (ref 31–36)
MCV RBC AUTO: 88.9 FL (ref 80–100)
MONOCYTES # BLD: 0.7 K/UL (ref 0–1.3)
MONOCYTES NFR BLD: 6.8 %
NEUTROPHILS # BLD: 5.4 K/UL (ref 1.7–7.7)
NEUTROPHILS NFR BLD: 51.5 %
NT-PROBNP SERPL-MCNC: <36 PG/ML (ref 0–124)
PLATELET # BLD AUTO: 331 K/UL (ref 135–450)
PMV BLD AUTO: 7.9 FL (ref 5–10.5)
POTASSIUM SERPL-SCNC: 4.1 MMOL/L (ref 3.5–5.1)
PROT SERPL-MCNC: 8 G/DL (ref 6.4–8.2)
RBC # BLD AUTO: 5.43 M/UL (ref 4.2–5.9)
SODIUM SERPL-SCNC: 137 MMOL/L (ref 136–145)
TROPONIN, HIGH SENSITIVITY: 15 NG/L (ref 0–22)
WBC # BLD AUTO: 10.6 K/UL (ref 4–11)

## 2024-05-15 PROCEDURE — 6370000000 HC RX 637 (ALT 250 FOR IP): Performed by: PHYSICIAN ASSISTANT

## 2024-05-15 PROCEDURE — 99285 EMERGENCY DEPT VISIT HI MDM: CPT

## 2024-05-15 PROCEDURE — 6360000002 HC RX W HCPCS: Performed by: PHYSICIAN ASSISTANT

## 2024-05-15 PROCEDURE — 85379 FIBRIN DEGRADATION QUANT: CPT

## 2024-05-15 PROCEDURE — 93005 ELECTROCARDIOGRAM TRACING: CPT | Performed by: EMERGENCY MEDICINE

## 2024-05-15 PROCEDURE — 96374 THER/PROPH/DIAG INJ IV PUSH: CPT

## 2024-05-15 PROCEDURE — 84484 ASSAY OF TROPONIN QUANT: CPT

## 2024-05-15 PROCEDURE — 85025 COMPLETE CBC W/AUTO DIFF WBC: CPT

## 2024-05-15 PROCEDURE — 80053 COMPREHEN METABOLIC PANEL: CPT

## 2024-05-15 PROCEDURE — 83880 ASSAY OF NATRIURETIC PEPTIDE: CPT

## 2024-05-15 PROCEDURE — 71046 X-RAY EXAM CHEST 2 VIEWS: CPT

## 2024-05-15 RX ORDER — ACETAMINOPHEN 500 MG
1000 TABLET ORAL ONCE
Status: COMPLETED | OUTPATIENT
Start: 2024-05-15 | End: 2024-05-15

## 2024-05-15 RX ORDER — CYCLOBENZAPRINE HCL 10 MG
10 TABLET ORAL 3 TIMES DAILY PRN
Qty: 21 TABLET | Refills: 0 | Status: SHIPPED | OUTPATIENT
Start: 2024-05-15 | End: 2024-05-25

## 2024-05-15 RX ORDER — ORPHENADRINE CITRATE 30 MG/ML
30 INJECTION INTRAMUSCULAR; INTRAVENOUS ONCE
Status: COMPLETED | OUTPATIENT
Start: 2024-05-15 | End: 2024-05-15

## 2024-05-15 RX ADMIN — ORPHENADRINE CITRATE 30 MG: 60 INJECTION INTRAMUSCULAR; INTRAVENOUS at 20:07

## 2024-05-15 RX ADMIN — ACETAMINOPHEN 1000 MG: 500 TABLET ORAL at 20:07

## 2024-05-15 ASSESSMENT — ENCOUNTER SYMPTOMS
ABDOMINAL PAIN: 0
RHINORRHEA: 0
NAUSEA: 0
COUGH: 0
VOMITING: 0
SHORTNESS OF BREATH: 0
DIARRHEA: 0

## 2024-05-15 ASSESSMENT — PAIN - FUNCTIONAL ASSESSMENT: PAIN_FUNCTIONAL_ASSESSMENT: 0-10

## 2024-05-15 ASSESSMENT — PAIN SCALES - WONG BAKER: WONGBAKER_NUMERICALRESPONSE: NO HURT

## 2024-05-15 ASSESSMENT — PAIN DESCRIPTION - LOCATION: LOCATION: CHEST

## 2024-05-15 ASSESSMENT — PAIN SCALES - GENERAL: PAINLEVEL_OUTOF10: 6

## 2024-05-16 ENCOUNTER — CARE COORDINATION (OUTPATIENT)
Dept: CARE COORDINATION | Age: 49
End: 2024-05-16

## 2024-05-16 LAB
EKG ATRIAL RATE: 73 BPM
EKG DIAGNOSIS: NORMAL
EKG P AXIS: 32 DEGREES
EKG P-R INTERVAL: 164 MS
EKG Q-T INTERVAL: 378 MS
EKG QRS DURATION: 102 MS
EKG QTC CALCULATION (BAZETT): 416 MS
EKG R AXIS: 38 DEGREES
EKG T AXIS: 15 DEGREES
EKG VENTRICULAR RATE: 73 BPM

## 2024-05-16 PROCEDURE — 93010 ELECTROCARDIOGRAM REPORT: CPT | Performed by: INTERNAL MEDICINE

## 2024-05-16 NOTE — CARE COORDINATION
7/15/2024  4:00 PM Korin Ashford MD [facility]           New Medications?:flexeril      Medication Reconciliation by phone -yes  Understands Medications? yes  Taking Medications? yes  Can you swallow your pills? yes    Any further needs in the home i.e. Equipment? no    Link to services in community?: no   Which services:

## 2024-05-16 NOTE — ED PROVIDER NOTES
[facility] EMERGENCY DEPARTMENT  EMERGENCY DEPARTMENT ENCOUNTER        Pt Name: Sahil Fenton  MRN: [id number]  Birthdate 1975  Date of evaluation: 5/15/2024  Provider: Odalis Christian PA-C  PCP: Korin Ashford MD  Note Started: 10:29 PM EDT 5/15/24      GLENIS. I have evaluated this patient.        CHIEF COMPLAINT       Chief Complaint   Patient presents with    Chest Pain     Right sided pain with tingling to BUE's for almost a week with alittle SOB       HISTORY OF PRESENT ILLNESS: 1 or more Elements     History From: Patient  Limitations to history : None    Sahil Fenton is a 48 y.o. male who presents to the emergency department today for evaluation for concerns of right-sided chest pain, and a \"tingling\" sensation to both of his upper extremities.  The patient states that the symptoms have been ongoing for 1 week.  The patient reports that the symptoms have been constant x 1 week.  The patient states that his symptoms seem to occur at rest, and did not does not notice them much with exertion.  The patient denies feeling short of breath, dizzy, lightheaded or diaphoretic with his symptoms.  He has no neck pain.  No back pain.  Patient does not smoke.  He has no history of any hypertension, diabetes or hyperlipidemia.  No family history of cardiac events.  He denies any recent travels, immobilizations or surgeries.  He has no history of DVT or PE.  No lower leg pain or swelling.  He denies any fever or chills.  No cough.  Patient states that he has been under stress, and is unsure if this may be contributing.    Nursing Notes were all reviewed and agreed with or any disagreements were addressed in the HPI.    REVIEW OF SYSTEMS :      Review of Systems   Constitutional:  Negative for activity change, appetite change, chills and fever.   HENT:  Negative for congestion and rhinorrhea.    Respiratory:  Negative for cough and shortness of breath.    Cardiovascular:  Negative for chest

## 2024-05-17 ENCOUNTER — OFFICE VISIT (OUTPATIENT)
Dept: INTERNAL MEDICINE CLINIC | Age: 49
End: 2024-05-17
Payer: COMMERCIAL

## 2024-05-17 VITALS
HEIGHT: 68 IN | OXYGEN SATURATION: 100 % | SYSTOLIC BLOOD PRESSURE: 122 MMHG | DIASTOLIC BLOOD PRESSURE: 76 MMHG | BODY MASS INDEX: 36.92 KG/M2 | WEIGHT: 243.6 LBS | HEART RATE: 83 BPM

## 2024-05-17 DIAGNOSIS — R07.9 CHEST PAIN, UNSPECIFIED TYPE: ICD-10-CM

## 2024-05-17 DIAGNOSIS — R07.89 OTHER CHEST PAIN: Primary | ICD-10-CM

## 2024-05-17 DIAGNOSIS — E66.01 SEVERE OBESITY (BMI 35.0-39.9) WITH COMORBIDITY (HCC): ICD-10-CM

## 2024-05-17 DIAGNOSIS — M93.90 OSTEOCHONDRITIS: ICD-10-CM

## 2024-05-17 PROCEDURE — 99214 OFFICE O/P EST MOD 30 MIN: CPT | Performed by: INTERNAL MEDICINE

## 2024-05-17 ASSESSMENT — ENCOUNTER SYMPTOMS
BLOOD IN STOOL: 0
ABDOMINAL PAIN: 0
COUGH: 0
CHEST TIGHTNESS: 0
SHORTNESS OF BREATH: 0
COLOR CHANGE: 0
WHEEZING: 0
CONSTIPATION: 0

## 2024-05-17 NOTE — PROGRESS NOTES
07:56 PM    GLUCOSE 79 01/15/2024 03:56 PM    GLUCOSE 88 07/14/2023 04:09 PM    CALCIUM 9.2 05/15/2024 07:56 PM    CALCIUM 8.9 01/15/2024 03:56 PM    CALCIUM 9.5 07/14/2023 04:09 PM     Lab Results   Component Value Date/Time    WBC 10.6 05/15/2024 07:56 PM    WBC 10.0 01/15/2024 03:56 PM    WBC 11.1 07/14/2023 04:09 PM    HGB 15.8 05/15/2024 07:56 PM    HGB 16.2 01/15/2024 03:56 PM    HGB 15.2 07/14/2023 04:09 PM    HCT 48.3 05/15/2024 07:56 PM    HCT 48.3 01/15/2024 03:56 PM    HCT 48.3 07/14/2023 04:09 PM    MCV 88.9 05/15/2024 07:56 PM    MCV 88.0 01/15/2024 03:56 PM    MCV 88.5 07/14/2023 04:09 PM     05/15/2024 07:56 PM     01/15/2024 03:56 PM     07/14/2023 04:09 PM     Lab Results   Component Value Date/Time    CHOL 195 01/15/2024 03:56 PM    CHOL 221 07/14/2023 04:09 PM    CHOL 286 01/20/2022 04:03 PM    TRIG 250 01/15/2024 03:56 PM    TRIG 149 07/14/2023 04:09 PM    TRIG 117 01/20/2022 04:03 PM    HDL 23 01/15/2024 03:56 PM    HDL 34 07/14/2023 04:09 PM    HDL 39 01/20/2022 04:03 PM           5/17/2024     3:43 PM 5/15/2024     7:26 PM 1/15/2024     2:56 PM   Vitals   SYSTOLIC 122 137 118   DIASTOLIC 76 94 76   Pulse 83 78 105   Temp  98 °F (36.7 °C)    Respirations  16    SpO2 100 % 95 % 96 %   Weight - Scale 243 lb 9.6 oz 240 lb 249 lb 3.2 oz   Height 5' 8\"     Body Mass Index 37.04 kg/m2     Pain Level  6        Chest Pain   This is a chronic problem. The current episode started more than 1 year ago. The onset quality is sudden. The problem has been unchanged. The pain is present in the lateral region. The patient is experiencing no pain. Pertinent negatives include no abdominal pain, cough, headaches, palpitations, shortness of breath or weakness.       Review of Systems   Constitutional:  Negative for activity change, appetite change, fatigue and unexpected weight change.   HENT:  Negative for congestion, ear pain and sinus pain.    Respiratory:  Negative for cough, chest

## 2024-05-17 NOTE — PATIENT INSTRUCTIONS
Overview    Costochondritis (kos-toe-lorenzo-DRY-tis) is an inflammation of the cartilage that connects a rib to the breastbone (sternum). Pain caused by costochondritis might mimic that of a heart attack or other heart conditions.

## 2024-05-21 ENCOUNTER — TELEPHONE (OUTPATIENT)
Dept: INTERNAL MEDICINE CLINIC | Age: 49
End: 2024-05-21

## 2024-05-21 RX ORDER — PREDNISONE 20 MG/1
20 TABLET ORAL 2 TIMES DAILY
Qty: 10 TABLET | Refills: 0 | Status: SHIPPED | OUTPATIENT
Start: 2024-05-21 | End: 2024-05-26

## 2024-05-21 NOTE — TELEPHONE ENCOUNTER
Patient called stating he was here for ED follow up for chest pain. States he was prescribed cyclobenzaprine 10mg and that it's not helping him with pain. Requesting if provider could prescribe something else that would help. Patient states he is still having chest pain. He was recommended to go to the ER if he's still having chest pain. Pt states he was already there last week and that they don't do much there. Confirmed stress test scheduled for 6/5/24.

## 2024-05-24 ENCOUNTER — TELEPHONE (OUTPATIENT)
Dept: INTERNAL MEDICINE CLINIC | Age: 49
End: 2024-05-24

## 2024-05-24 NOTE — TELEPHONE ENCOUNTER
Once his prednisone and his he probably should stop using it and continue only with anti-inflammatories and heat I would not use more prednisone than this for a simple costochondritis..    He can use Voltaren gel over-the-counter as well as heating pad

## 2024-05-24 NOTE — TELEPHONE ENCOUNTER
Patient calling, is asking for either a refill of Prednisone 20 MG or if the Dr could recommend something over the counter that will help with the chest pain. Patient states he only has enough Prednisone left for Saturday and Sunday. Please advise

## 2024-05-30 ENCOUNTER — OFFICE VISIT (OUTPATIENT)
Dept: INTERNAL MEDICINE CLINIC | Age: 49
End: 2024-05-30
Payer: COMMERCIAL

## 2024-05-30 VITALS
BODY MASS INDEX: 37.41 KG/M2 | WEIGHT: 246.8 LBS | DIASTOLIC BLOOD PRESSURE: 82 MMHG | HEART RATE: 90 BPM | HEIGHT: 68 IN | SYSTOLIC BLOOD PRESSURE: 126 MMHG | OXYGEN SATURATION: 97 %

## 2024-05-30 DIAGNOSIS — M54.12 CERVICAL RADICULOPATHY: Primary | ICD-10-CM

## 2024-05-30 PROCEDURE — 99213 OFFICE O/P EST LOW 20 MIN: CPT | Performed by: INTERNAL MEDICINE

## 2024-05-30 RX ORDER — PREDNISONE 20 MG/1
20 TABLET ORAL 2 TIMES DAILY
Qty: 10 TABLET | Refills: 0 | Status: SHIPPED | OUTPATIENT
Start: 2024-05-30 | End: 2024-06-04

## 2024-05-30 NOTE — PROGRESS NOTES
Sahil Fenton (:  1975) is a 48 y.o. male,Established patient, here for evaluation of the following chief complaint(s):  Pain      Assessment & Plan   ASSESSMENT/PLAN:  1. Cervical radiculopathy  -     XR Cervical 4 Vw; Future  -     predniSONE (DELTASONE) 20 MG tablet; Take 1 tablet by mouth 2 times daily for 5 days, Disp-10 tablet, R-0Normal  -     [facility] - Physical Therapy    At this point patient symptoms are very consistent with cervical radicular pain affecting his lower cervical dermatomes the patient will be started on a short course of prednisone and x-ray will be done to assess any changes given his history but physical therapy would be the primary course of treatment and he will be set up with them  Return if symptoms worsen or fail to improve, for As scheduled.         Subjective   SUBJECTIVE/OBJECTIVE:    Lab Review   Lab Results   Component Value Date/Time     05/15/2024 07:56 PM     01/15/2024 03:56 PM     2023 04:09 PM    K 4.1 05/15/2024 07:56 PM    K 4.0 01/15/2024 03:56 PM    K 4.3 2023 04:09 PM    K 4.1 2021 09:22 AM    CO2 23 05/15/2024 07:56 PM    CO2 21 01/15/2024 03:56 PM    CO2 24 2023 04:09 PM    BUN 17 05/15/2024 07:56 PM    BUN 13 01/15/2024 03:56 PM    BUN 10 2023 04:09 PM    CREATININE 0.8 05/15/2024 07:56 PM    CREATININE 0.9 01/15/2024 03:56 PM    CREATININE 0.9 2023 04:09 PM    GLUCOSE 90 05/15/2024 07:56 PM    GLUCOSE 79 01/15/2024 03:56 PM    GLUCOSE 88 2023 04:09 PM    CALCIUM 9.2 05/15/2024 07:56 PM    CALCIUM 8.9 01/15/2024 03:56 PM    CALCIUM 9.5 2023 04:09 PM     Lab Results   Component Value Date/Time    WBC 10.6 05/15/2024 07:56 PM    WBC 10.0 01/15/2024 03:56 PM    WBC 11.1 2023 04:09 PM    HGB 15.8 05/15/2024 07:56 PM    HGB 16.2 01/15/2024 03:56 PM    HGB 15.2 2023 04:09 PM    HCT 48.3 05/15/2024 07:56 PM    HCT 48.3 01/15/2024 03:56 PM    HCT 48.3 2023

## 2024-06-03 ENCOUNTER — HOSPITAL ENCOUNTER (OUTPATIENT)
Age: 49
Discharge: HOME | End: 2024-06-03
Payer: COMMERCIAL

## 2024-06-03 ENCOUNTER — HOSPITAL ENCOUNTER (OUTPATIENT)
Dept: GENERAL RADIOLOGY | Age: 49
Discharge: HOME | End: 2024-06-03
Payer: COMMERCIAL

## 2024-06-03 DIAGNOSIS — M54.12 CERVICAL RADICULOPATHY: ICD-10-CM

## 2024-06-03 DIAGNOSIS — R52 PAIN: ICD-10-CM

## 2024-06-03 PROCEDURE — 72050 X-RAY EXAM NECK SPINE 4/5VWS: CPT

## 2024-06-05 ENCOUNTER — HOSPITAL ENCOUNTER (OUTPATIENT)
Age: 49
Discharge: HOME | End: 2024-06-07
Payer: COMMERCIAL

## 2024-06-05 VITALS
HEART RATE: 83 BPM | WEIGHT: 239 LBS | HEIGHT: 68 IN | BODY MASS INDEX: 36.22 KG/M2 | SYSTOLIC BLOOD PRESSURE: 131 MMHG | DIASTOLIC BLOOD PRESSURE: 92 MMHG

## 2024-06-05 DIAGNOSIS — R07.9 CHEST PAIN, UNSPECIFIED TYPE: ICD-10-CM

## 2024-06-05 LAB
ECHO BSA: 2.28 M2
STRESS BASELINE DIAS BP: 92 MMHG
STRESS BASELINE HR: 83 BPM
STRESS BASELINE SYS BP: 131 MMHG
STRESS ESTIMATED WORKLOAD: 9 METS
STRESS EXERCISE DUR MIN: 7 MIN
STRESS EXERCISE DUR SEC: 45 SEC
STRESS O2 SAT PEAK: 95 %
STRESS O2 SAT REST: 95 %
STRESS PEAK DIAS BP: 75 MMHG
STRESS PEAK SYS BP: 142 MMHG
STRESS PERCENT HR ACHIEVED: 86 %
STRESS POST PEAK HR: 148 BPM
STRESS RATE PRESSURE PRODUCT: NORMAL BPM*MMHG
STRESS TARGET HR: 173 BPM

## 2024-06-05 PROCEDURE — 93017 CV STRESS TEST TRACING ONLY: CPT

## 2024-06-06 ENCOUNTER — TELEPHONE (OUTPATIENT)
Dept: CARDIOLOGY CLINIC | Age: 49
End: 2024-06-06

## 2024-06-06 DIAGNOSIS — R94.39 ABNORMAL STRESS TEST: Primary | ICD-10-CM

## 2024-06-06 NOTE — TELEPHONE ENCOUNTER
Patient was referred by Korin Ashford to the [facility] location with Dr. Powell.  Patient has been scheduled for 6/19/24 at 9:00am (am/pm) with Dr. Holland.  Patient verbalized understanding of appointment instructions.  Call complete. joey

## 2024-06-06 NOTE — RESULT ENCOUNTER NOTE
Please let the patient know that results showed that his stress test was borderline positive I would like him to see cardiology for further evaluation referral has been placed in the chart

## 2024-06-11 RX ORDER — LEVOTHYROXINE SODIUM 0.12 MG/1
125 TABLET ORAL DAILY
Qty: 90 TABLET | Refills: 1 | OUTPATIENT
Start: 2024-06-11

## 2024-06-11 NOTE — TELEPHONE ENCOUNTER
Last OV: 5/30/2024  Next OV: 7/15/2024    Next appointment due:(around 7/15/2024).     Last fill:1/29/24  Refills:1#90

## 2024-06-13 NOTE — PROGRESS NOTES
Seizures Father     Diabetes Brother        Home Medications:  Were reviewed and are listed in nursing record. and/or listed below  Prior to Admission medications    Medication Sig Start Date End Date Taking? Authorizing Provider   atorvastatin (LIPITOR) 80 MG tablet Take 1 tablet by mouth daily 6/19/24  Yes Rory Holland MD   metoprolol tartrate (LOPRESSOR) 50 MG tablet Take 3 tablets by mouth once for 1 dose 6/19/24 6/19/24 Yes Rory Holland MD   levothyroxine (SYNTHROID) 125 MCG tablet TAKE 1 TABLET BY MOUTH EVERY DAY 1/29/24  Yes Korin Ashford MD   sucralfate (CARAFATE) 1 GM tablet Take 1 tablet by mouth 3 times daily as needed (GERD) As needed. 1/15/24  Yes Korin Ashford MD   venlafaxine (EFFEXOR XR) 75 MG extended release capsule TAKE 1 CAPSULE BY MOUTH EVERY DAY 1/15/24  Yes Korin Ashford MD   levothyroxine (SYNTHROID) 175 MCG tablet Take 1 tablet by mouth daily 7/14/23  Yes Korin Ashford MD   albuterol sulfate  (90 Base) MCG/ACT inhaler Inhale 2 puffs into the lungs every 6 hours as needed for Wheezing 12/27/21  Yes Bridges, Claude, MD   budesonide-formoterol (SYMBICORT) 160-4.5 MCG/ACT AERO Inhale 2 puffs into the lungs 2 times daily 12/27/21  Yes Bridges, Claude, MD   Respiratory Therapy Supplies (NEBULIZER COMPRESSOR) KIT 1 kit by Does not apply route once for 1 dose 5/7/19 6/19/24 Yes Maynor Spaulding MD   albuterol (PROVENTIL) (2.5 MG/3ML) 0.083% nebulizer solution Take 3 mLs by nebulization every 6 hours as needed for Wheezing 4/15/19  Yes Maynor Spaulding MD        Current Medications:  Current Outpatient Medications   Medication Sig Dispense Refill    atorvastatin (LIPITOR) 80 MG tablet Take 1 tablet by mouth daily 90 tablet 3    metoprolol tartrate (LOPRESSOR) 50 MG tablet Take 3 tablets by mouth once for 1 dose 3 tablet 0    levothyroxine (SYNTHROID) 125 MCG tablet TAKE 1 TABLET BY MOUTH EVERY DAY 90 tablet 1    sucralfate (CARAFATE) 1 GM tablet Take 1 tablet by mouth

## 2024-06-19 ENCOUNTER — OFFICE VISIT (OUTPATIENT)
Dept: CARDIOLOGY CLINIC | Age: 49
End: 2024-06-19
Payer: COMMERCIAL

## 2024-06-19 VITALS
DIASTOLIC BLOOD PRESSURE: 88 MMHG | HEIGHT: 68 IN | HEART RATE: 88 BPM | BODY MASS INDEX: 37.44 KG/M2 | OXYGEN SATURATION: 97 % | SYSTOLIC BLOOD PRESSURE: 134 MMHG | WEIGHT: 247 LBS

## 2024-06-19 DIAGNOSIS — E78.5 HYPERLIPIDEMIA, UNSPECIFIED HYPERLIPIDEMIA TYPE: ICD-10-CM

## 2024-06-19 DIAGNOSIS — G47.33 OSA (OBSTRUCTIVE SLEEP APNEA): ICD-10-CM

## 2024-06-19 DIAGNOSIS — Z76.89 ENCOUNTER TO ESTABLISH CARE: ICD-10-CM

## 2024-06-19 DIAGNOSIS — R94.39 ABNORMAL STRESS TEST: Primary | ICD-10-CM

## 2024-06-19 DIAGNOSIS — R07.9 CHEST PAIN, UNSPECIFIED TYPE: ICD-10-CM

## 2024-06-19 PROCEDURE — 99204 OFFICE O/P NEW MOD 45 MIN: CPT | Performed by: INTERNAL MEDICINE

## 2024-06-19 RX ORDER — METOPROLOL TARTRATE 50 MG/1
150 TABLET, FILM COATED ORAL ONCE
Qty: 3 TABLET | Refills: 0 | Status: SHIPPED | OUTPATIENT
Start: 2024-06-19 | End: 2024-06-19

## 2024-06-19 RX ORDER — METOPROLOL TARTRATE 1 MG/ML
5 INJECTION, SOLUTION INTRAVENOUS EVERY 5 MIN PRN
Status: SHIPPED | OUTPATIENT
Start: 2024-06-19

## 2024-06-19 RX ORDER — ATORVASTATIN CALCIUM 80 MG/1
80 TABLET, FILM COATED ORAL DAILY
Qty: 90 TABLET | Refills: 3 | Status: SHIPPED | OUTPATIENT
Start: 2024-06-19

## 2024-06-19 RX ORDER — NITROGLYCERIN 0.4 MG/1
0.8 TABLET SUBLINGUAL ONCE
Status: SHIPPED | OUTPATIENT
Start: 2024-06-19

## 2024-06-19 NOTE — PATIENT INSTRUCTIONS
Start the following medications:  Lipitor 80 mg daily    Schedule cardiac CT  Take metoprolol (Lopressor) 150 mg, one to two hours prior to your test.     Schedule echo soon    Schedule office visit

## 2024-07-01 ENCOUNTER — TELEPHONE (OUTPATIENT)
Dept: CARDIOLOGY CLINIC | Age: 49
End: 2024-07-01

## 2024-07-01 NOTE — TELEPHONE ENCOUNTER
----- Message from Rory Holland MD sent at 7/1/2024 10:10 AM EDT -----  Please inform patient of normal echo result

## 2024-07-02 ENCOUNTER — TELEPHONE (OUTPATIENT)
Dept: INTERNAL MEDICINE CLINIC | Age: 49
End: 2024-07-02

## 2024-07-02 ENCOUNTER — TELEPHONE (OUTPATIENT)
Dept: CARDIOLOGY CLINIC | Age: 49
End: 2024-07-02

## 2024-07-02 NOTE — TELEPHONE ENCOUNTER
Patient called and would like to know if he should still continue to get CTA CARDIAC W C STRC MORP W CONTRAST test done because he is scheduled to see cardiology in 08/24

## 2024-07-02 NOTE — TELEPHONE ENCOUNTER
Please advise the patient that as we discussed during his visit with trying to expedite this by doing the CTA even before he sees them if he wants to hold off doing it until he is seen that is okay as well

## 2024-07-02 NOTE — TELEPHONE ENCOUNTER
The patient phoned asking if he still needs to have the CTA Cardiac test that is scheduled on 07/23/24.  He states that the Echo he had on 06/28/24 was normal and he is not sure if he needs to have the CTA done.  Please advise.  Thank you

## 2024-07-14 DIAGNOSIS — F32.0 MAJOR DEPRESSIVE DISORDER, SINGLE EPISODE, MILD (HCC): ICD-10-CM

## 2024-07-15 ENCOUNTER — OFFICE VISIT (OUTPATIENT)
Dept: INTERNAL MEDICINE CLINIC | Age: 49
End: 2024-07-15
Payer: COMMERCIAL

## 2024-07-15 VITALS
SYSTOLIC BLOOD PRESSURE: 128 MMHG | HEART RATE: 79 BPM | DIASTOLIC BLOOD PRESSURE: 88 MMHG | WEIGHT: 257 LBS | OXYGEN SATURATION: 99 % | BODY MASS INDEX: 39.08 KG/M2

## 2024-07-15 DIAGNOSIS — E03.9 ACQUIRED HYPOTHYROIDISM: ICD-10-CM

## 2024-07-15 DIAGNOSIS — E78.2 MIXED HYPERLIPIDEMIA: ICD-10-CM

## 2024-07-15 DIAGNOSIS — R73.03 PREDIABETES: ICD-10-CM

## 2024-07-15 DIAGNOSIS — E03.9 ACQUIRED HYPOTHYROIDISM: Primary | ICD-10-CM

## 2024-07-15 PROBLEM — F33.0 MAJOR DEPRESSIVE DISORDER, RECURRENT, MILD (HCC): Status: RESOLVED | Noted: 2023-07-14 | Resolved: 2024-07-15

## 2024-07-15 PROBLEM — F33.1 MAJOR DEPRESSIVE DISORDER, RECURRENT, MODERATE (HCC): Status: RESOLVED | Noted: 2023-07-14 | Resolved: 2024-07-15

## 2024-07-15 PROBLEM — F33.9 MAJOR DEPRESSIVE DISORDER, RECURRENT, UNSPECIFIED (HCC): Status: RESOLVED | Noted: 2023-07-14 | Resolved: 2024-07-15

## 2024-07-15 PROCEDURE — 99214 OFFICE O/P EST MOD 30 MIN: CPT | Performed by: INTERNAL MEDICINE

## 2024-07-15 RX ORDER — VENLAFAXINE HYDROCHLORIDE 75 MG/1
CAPSULE, EXTENDED RELEASE ORAL
Qty: 30 CAPSULE | Refills: 5 | Status: SHIPPED | OUTPATIENT
Start: 2024-07-15

## 2024-07-15 SDOH — ECONOMIC STABILITY: FOOD INSECURITY: WITHIN THE PAST 12 MONTHS, YOU WORRIED THAT YOUR FOOD WOULD RUN OUT BEFORE YOU GOT MONEY TO BUY MORE.: PATIENT DECLINED

## 2024-07-15 SDOH — ECONOMIC STABILITY: FOOD INSECURITY: WITHIN THE PAST 12 MONTHS, THE FOOD YOU BOUGHT JUST DIDN'T LAST AND YOU DIDN'T HAVE MONEY TO GET MORE.: PATIENT DECLINED

## 2024-07-15 SDOH — ECONOMIC STABILITY: HOUSING INSECURITY
IN THE LAST 12 MONTHS, WAS THERE A TIME WHEN YOU DID NOT HAVE A STEADY PLACE TO SLEEP OR SLEPT IN A SHELTER (INCLUDING NOW)?: PATIENT DECLINED

## 2024-07-15 SDOH — ECONOMIC STABILITY: INCOME INSECURITY: HOW HARD IS IT FOR YOU TO PAY FOR THE VERY BASICS LIKE FOOD, HOUSING, MEDICAL CARE, AND HEATING?: PATIENT DECLINED

## 2024-07-15 ASSESSMENT — ENCOUNTER SYMPTOMS
SWOLLEN GLANDS: 0
SORE THROAT: 0
HOARSE VOICE: 0
VISUAL CHANGE: 0

## 2024-07-15 NOTE — TELEPHONE ENCOUNTER
Last OV: 5/30/2024  Next OV: 7/15/2024    Next appointment due:Return in about 6 months (around 7/15/2024).        Last fill:1/15/24  Refills:5

## 2024-07-15 NOTE — PROGRESS NOTES
Sahil Fenton (:  1975) is a 48 y.o. male,Established patient, here for evaluation of the following chief complaint(s):  Pain (Follow up for ISCHEMIA to discuss today)      Assessment & Plan   ASSESSMENT/PLAN:  1. Acquired hypothyroidism  -     T4, Free; Future  -     TSH; Future  2. Mixed hyperlipidemia  3. Prediabetes  -     Hemoglobin A1C; Future    Patient has been seen by cardiology he was started on a high-dose statin and he is tolerating it well at this point the patient is also set up to have a CTA will await the results to decide on the next steps of treatment    The patient thyroid function was showed slight overactivity we will repeat the test today to decide if adjustment of his dosage is needed    Patient will have his prediabetic state reevaluated with an A1c today    Patient still have some costochondritis and cervical radicular pain it is fluctuating and I think it is independent from his cardiac findings and depending on the CTA will decide on any change in his  No follow-ups on file.         Subjective   SUBJECTIVE/OBJECTIVE:    Lab Review   Lab Results   Component Value Date/Time     05/15/2024 07:56 PM     01/15/2024 03:56 PM     2023 04:09 PM    K 4.1 05/15/2024 07:56 PM    K 4.0 01/15/2024 03:56 PM    K 4.3 2023 04:09 PM    K 4.1 2021 09:22 AM    CO2 23 05/15/2024 07:56 PM    CO2 21 01/15/2024 03:56 PM    CO2 24 2023 04:09 PM    BUN 17 05/15/2024 07:56 PM    BUN 13 01/15/2024 03:56 PM    BUN 10 2023 04:09 PM    CREATININE 0.8 05/15/2024 07:56 PM    CREATININE 0.9 01/15/2024 03:56 PM    CREATININE 0.9 2023 04:09 PM    GLUCOSE 90 05/15/2024 07:56 PM    GLUCOSE 79 01/15/2024 03:56 PM    GLUCOSE 88 2023 04:09 PM    CALCIUM 9.2 05/15/2024 07:56 PM    CALCIUM 8.9 01/15/2024 03:56 PM    CALCIUM 9.5 2023 04:09 PM     Lab Results   Component Value Date/Time    WBC 10.6 05/15/2024 07:56 PM    WBC 10.0 01/15/2024 03:56 PM

## 2024-07-16 ENCOUNTER — TELEPHONE (OUTPATIENT)
Dept: INTERVENTIONAL RADIOLOGY/VASCULAR | Age: 49
End: 2024-07-16

## 2024-07-16 LAB
EST. AVERAGE GLUCOSE BLD GHB EST-MCNC: 114 MG/DL
HBA1C MFR BLD: 5.6 %
T4 FREE SERPL-MCNC: 0.7 NG/DL (ref 0.9–1.8)
TSH SERPL DL<=0.005 MIU/L-ACNC: 15.5 UIU/ML (ref 0.27–4.2)

## 2024-07-16 NOTE — RESULT ENCOUNTER NOTE
Please let the patient know that results shows that his thyroid is slowing down again please double check with the patient and make sure he is taking the medications daily if he is not he should and then we will repeat the test in 4 to 6 weeks on the other hand if he has been taking it regularly we will probably need to increase his Synthroid dose please check with him and let me know

## 2024-07-17 RX ORDER — LEVOTHYROXINE SODIUM 137 UG/1
137 TABLET ORAL DAILY
Qty: 90 TABLET | Refills: 1 | Status: SHIPPED | OUTPATIENT
Start: 2024-07-17

## 2024-07-17 NOTE — RESULT ENCOUNTER NOTE
Please let the patient know I have increased his dose to 137 mcg please also verify with him that the dose he has been taking is 125 mcg

## 2024-07-23 ENCOUNTER — HOSPITAL ENCOUNTER (OUTPATIENT)
Dept: CT IMAGING | Age: 49
Discharge: HOME | End: 2024-07-23
Attending: INTERNAL MEDICINE
Payer: COMMERCIAL

## 2024-07-23 ENCOUNTER — TELEPHONE (OUTPATIENT)
Dept: CARDIOLOGY CLINIC | Age: 49
End: 2024-07-23

## 2024-07-23 VITALS
WEIGHT: 240 LBS | HEART RATE: 58 BPM | HEIGHT: 68 IN | RESPIRATION RATE: 16 BRPM | BODY MASS INDEX: 36.37 KG/M2 | OXYGEN SATURATION: 95 % | DIASTOLIC BLOOD PRESSURE: 104 MMHG | TEMPERATURE: 97.9 F | SYSTOLIC BLOOD PRESSURE: 144 MMHG

## 2024-07-23 DIAGNOSIS — R94.39 ABNORMAL STRESS TEST: ICD-10-CM

## 2024-07-23 DIAGNOSIS — R07.9 CHEST PAIN, UNSPECIFIED TYPE: ICD-10-CM

## 2024-07-23 PROCEDURE — 75574 CT ANGIO HRT W/3D IMAGE: CPT

## 2024-07-23 PROCEDURE — 6360000004 HC RX CONTRAST MEDICATION: Performed by: INTERNAL MEDICINE

## 2024-07-23 RX ORDER — NITROGLYCERIN 0.4 MG/1
0.4 TABLET SUBLINGUAL ONCE
Status: DISCONTINUED | OUTPATIENT
Start: 2024-07-23 | End: 2024-07-24 | Stop reason: HOSPADM

## 2024-07-23 RX ADMIN — IOPAMIDOL 90 ML: 755 INJECTION, SOLUTION INTRAVENOUS at 08:41

## 2024-07-23 NOTE — TELEPHONE ENCOUNTER
----- Message from Rory Holland MD sent at 7/23/2024  3:39 PM EDT -----  Please inform patient of normal CT scan result

## 2024-07-23 NOTE — FLOWSHEET NOTE
Pre scam HR 59, sinus shaji.  Pt tolerated procedure well. VSS. IV removed without difficulty. Pt given d/c instructions and stated understanding.   Post scan HR 55, sinus shaji.     Released in stable condition to home..

## 2024-09-09 PROBLEM — R07.9 CHEST PAIN: Status: ACTIVE | Noted: 2024-09-09

## 2024-10-06 ENCOUNTER — OFFICE VISIT (OUTPATIENT)
Age: 49
End: 2024-10-06

## 2024-10-06 VITALS
HEART RATE: 97 BPM | TEMPERATURE: 98 F | SYSTOLIC BLOOD PRESSURE: 120 MMHG | HEIGHT: 68 IN | DIASTOLIC BLOOD PRESSURE: 84 MMHG | WEIGHT: 256 LBS | BODY MASS INDEX: 38.8 KG/M2 | OXYGEN SATURATION: 96 %

## 2024-10-06 DIAGNOSIS — J40 BRONCHITIS: Primary | ICD-10-CM

## 2024-10-06 RX ORDER — PREDNISONE 20 MG/1
20 TABLET ORAL 2 TIMES DAILY
Qty: 10 TABLET | Refills: 0 | Status: SHIPPED | OUTPATIENT
Start: 2024-10-06 | End: 2024-10-11

## 2024-10-06 RX ORDER — AZITHROMYCIN 250 MG/1
TABLET, FILM COATED ORAL
Qty: 6 TABLET | Refills: 0 | Status: SHIPPED | OUTPATIENT
Start: 2024-10-06 | End: 2024-10-16

## 2024-10-06 RX ORDER — BENZONATATE 200 MG/1
200 CAPSULE ORAL 3 TIMES DAILY PRN
Qty: 30 CAPSULE | Refills: 0 | Status: SHIPPED | OUTPATIENT
Start: 2024-10-06

## 2024-10-15 ENCOUNTER — OFFICE VISIT (OUTPATIENT)
Dept: INTERNAL MEDICINE CLINIC | Age: 49
End: 2024-10-15
Payer: COMMERCIAL

## 2024-10-15 VITALS
HEIGHT: 68 IN | BODY MASS INDEX: 38.62 KG/M2 | OXYGEN SATURATION: 98 % | WEIGHT: 254.8 LBS | HEART RATE: 107 BPM | DIASTOLIC BLOOD PRESSURE: 86 MMHG | SYSTOLIC BLOOD PRESSURE: 126 MMHG

## 2024-10-15 DIAGNOSIS — J45.30 MILD PERSISTENT ASTHMA WITHOUT COMPLICATION: ICD-10-CM

## 2024-10-15 DIAGNOSIS — E03.9 ACQUIRED HYPOTHYROIDISM: ICD-10-CM

## 2024-10-15 DIAGNOSIS — J40 BRONCHITIS: ICD-10-CM

## 2024-10-15 DIAGNOSIS — J45.41 MODERATE PERSISTENT ASTHMA WITH ACUTE EXACERBATION: ICD-10-CM

## 2024-10-15 DIAGNOSIS — J45.30 MILD PERSISTENT ASTHMA WITHOUT COMPLICATION: Primary | ICD-10-CM

## 2024-10-15 DIAGNOSIS — K64.2 GRADE III HEMORRHOIDS: ICD-10-CM

## 2024-10-15 PROBLEM — R07.9 CHEST PAIN: Status: RESOLVED | Noted: 2024-09-09 | Resolved: 2024-10-15

## 2024-10-15 LAB
ANION GAP SERPL CALCULATED.3IONS-SCNC: 12 MMOL/L (ref 3–16)
BASOPHILS # BLD: 0.1 K/UL (ref 0–0.2)
BASOPHILS NFR BLD: 0.3 %
BUN SERPL-MCNC: 17 MG/DL (ref 7–20)
CALCIUM SERPL-MCNC: 9.6 MG/DL (ref 8.3–10.6)
CHLORIDE SERPL-SCNC: 101 MMOL/L (ref 99–110)
CO2 SERPL-SCNC: 23 MMOL/L (ref 21–32)
CREAT SERPL-MCNC: 1 MG/DL (ref 0.9–1.3)
DEPRECATED RDW RBC AUTO: 13.5 % (ref 12.4–15.4)
EOSINOPHIL # BLD: 0.3 K/UL (ref 0–0.6)
EOSINOPHIL NFR BLD: 2 %
GFR SERPLBLD CREATININE-BSD FMLA CKD-EPI: >90 ML/MIN/{1.73_M2}
GLUCOSE SERPL-MCNC: 95 MG/DL (ref 70–99)
HCT VFR BLD AUTO: 45.3 % (ref 40.5–52.5)
HGB BLD-MCNC: 15.3 G/DL (ref 13.5–17.5)
LYMPHOCYTES # BLD: 2.8 K/UL (ref 1–5.1)
LYMPHOCYTES NFR BLD: 18 %
MCH RBC QN AUTO: 31.1 PG (ref 26–34)
MCHC RBC AUTO-ENTMCNC: 33.8 G/DL (ref 31–36)
MCV RBC AUTO: 92 FL (ref 80–100)
MONOCYTES # BLD: 1.3 K/UL (ref 0–1.3)
MONOCYTES NFR BLD: 8.1 %
NEUTROPHILS # BLD: 11.3 K/UL (ref 1.7–7.7)
NEUTROPHILS NFR BLD: 71.6 %
PLATELET # BLD AUTO: 297 K/UL (ref 135–450)
PMV BLD AUTO: 8.5 FL (ref 5–10.5)
POTASSIUM SERPL-SCNC: 4.2 MMOL/L (ref 3.5–5.1)
RBC # BLD AUTO: 4.92 M/UL (ref 4.2–5.9)
SODIUM SERPL-SCNC: 136 MMOL/L (ref 136–145)
TSH SERPL DL<=0.005 MIU/L-ACNC: 6.63 UIU/ML (ref 0.27–4.2)
WBC # BLD AUTO: 15.7 K/UL (ref 4–11)

## 2024-10-15 PROCEDURE — 99214 OFFICE O/P EST MOD 30 MIN: CPT | Performed by: INTERNAL MEDICINE

## 2024-10-15 RX ORDER — BENZOCAINE/MENTHOL 6 MG-10 MG
LOZENGE MUCOUS MEMBRANE
Qty: 30 G | Refills: 1 | Status: SHIPPED | OUTPATIENT
Start: 2024-10-15 | End: 2024-10-22

## 2024-10-15 RX ORDER — BUDESONIDE AND FORMOTEROL FUMARATE DIHYDRATE 160; 4.5 UG/1; UG/1
2 AEROSOL RESPIRATORY (INHALATION) 2 TIMES DAILY
Qty: 1 EACH | Refills: 5 | Status: SHIPPED | OUTPATIENT
Start: 2024-10-15

## 2024-10-15 RX ORDER — AZITHROMYCIN 250 MG/1
TABLET, FILM COATED ORAL
Qty: 6 TABLET | Refills: 0 | Status: SHIPPED | OUTPATIENT
Start: 2024-10-15 | End: 2024-10-25

## 2024-10-15 RX ORDER — BENZONATATE 200 MG/1
200 CAPSULE ORAL 3 TIMES DAILY PRN
Qty: 30 CAPSULE | Refills: 0 | Status: SHIPPED | OUTPATIENT
Start: 2024-10-15

## 2024-10-15 ASSESSMENT — ENCOUNTER SYMPTOMS
COUGH: 1
SHORTNESS OF BREATH: 0
RHINORRHEA: 0

## 2024-10-15 NOTE — PROGRESS NOTES
Sahil Fenton (:  1975) is a 49 y.o. male,Established patient, here for evaluation of the following chief complaint(s):  3 Month Follow-Up      Assessment & Plan   ASSESSMENT/PLAN:  1. Mild persistent asthma without complication  -     Basic Metabolic Panel; Future  -     CBC with Auto Differential; Future  2. Bronchitis  -     azithromycin (ZITHROMAX) 250 MG tablet; 500mg on day 1 followed by 250mg on days 2 - 5, Disp-6 tablet, R-0Normal  -     benzonatate (TESSALON) 200 MG capsule; Take 1 capsule by mouth 3 times daily as needed for Cough, Disp-30 capsule, R-0Normal  3. Moderate persistent asthma with acute exacerbation  -     budesonide-formoterol (SYMBICORT) 160-4.5 MCG/ACT AERO; Inhale 2 puffs into the lungs 2 times daily, Disp-1 each, R-5Normal  4. Acquired hypothyroidism  -     TSH; Future  -     Basic Metabolic Panel; Future  5. Grade III hemorrhoids  -     hydrocortisone (ALA-GISELLA) 1 % cream; Apply topically 2 times daily., Disp-30 g, R-1, Normal  Patient is having irritating cough that is been going on for a minimum of 4 weeks I feel that his symptoms are very suggestive of an active asthma he is advised to use his Symbicort inhaler on regular basis as well as the albuterol for rescue patient is also advised to use Flonase and Claritin to help with the allergy symptoms this time of the year    Patient is concerned about the possibility of an infection so Z-Darren would be used but I think the asthma activity is the more important aspect here    Patient thyroid function will be assessed today to decide if adjustment of his dose is needed he has been feeling sluggish lately and will have to decide if that is related to thyroid, asthma or both of them    Patient has been having some bleeding again from possibly the hemorrhoid so we will have him use the hydrocortisone cream and adjust his diet to eat more fiber increase his fluid intake to make his stool softer and prevent this from happening on

## 2024-10-16 ENCOUNTER — TELEPHONE (OUTPATIENT)
Dept: INTERNAL MEDICINE CLINIC | Age: 49
End: 2024-10-16

## 2024-10-18 ENCOUNTER — TELEPHONE (OUTPATIENT)
Dept: INTERNAL MEDICINE CLINIC | Age: 49
End: 2024-10-18

## 2024-10-18 NOTE — TELEPHONE ENCOUNTER
Spoke to pharmacy verbal ok'd for covered alternative for patient with patient approval.[email or web address]  250-50.1 puff bid.

## 2024-10-18 NOTE — TELEPHONE ENCOUNTER
Pt calling, states insurance will not cover budesonide-formoterol (SYMBICORT) 160-4.5 MCG/ACT AERO. Pharmacy informed him insurance would cover wixela, salmeterol or fluticasone/vilanterol. Asks if any are comparable to symbicort and can be sent to [facility] on [address]. Please advise.

## 2024-10-21 NOTE — RESULT ENCOUNTER NOTE
Please let the patient know that results were acceptable thyroid is still borderline and will need to be monitored within 6 weeks or so

## 2024-11-06 ENCOUNTER — TELEPHONE (OUTPATIENT)
Dept: INTERNAL MEDICINE CLINIC | Age: 49
End: 2024-11-06

## 2024-11-06 NOTE — TELEPHONE ENCOUNTER
Pt reporting nose bleeding for a few days now. Bleeding is not constant, no injury noted. Pt scheduled for tomorrow with  - pt is ok to wait. He was advised to go to ER/Urgent Care should symptoms worsen.

## 2024-11-07 ENCOUNTER — OFFICE VISIT (OUTPATIENT)
Dept: INTERNAL MEDICINE CLINIC | Age: 49
End: 2024-11-07

## 2024-11-07 VITALS
WEIGHT: 257.4 LBS | DIASTOLIC BLOOD PRESSURE: 88 MMHG | BODY MASS INDEX: 39.01 KG/M2 | OXYGEN SATURATION: 97 % | HEIGHT: 68 IN | SYSTOLIC BLOOD PRESSURE: 124 MMHG | HEART RATE: 92 BPM

## 2024-11-07 DIAGNOSIS — R04.0 NOSEBLEED: Primary | ICD-10-CM

## 2024-11-07 DIAGNOSIS — E03.9 ACQUIRED HYPOTHYROIDISM: ICD-10-CM

## 2024-11-07 RX ORDER — ECHINACEA PURPUREA EXTRACT 125 MG
1 TABLET ORAL PRN
Qty: 1 EACH | Refills: 3 | Status: SHIPPED | OUTPATIENT
Start: 2024-11-07

## 2024-11-07 ASSESSMENT — ENCOUNTER SYMPTOMS: HOARSE VOICE: 0

## 2024-11-07 NOTE — PROGRESS NOTES
Sahil Fenton (:  1975) is a 49 y.o. male,Established patient, here for evaluation of the following chief complaint(s):  Epistaxis      Assessment & Plan   ASSESSMENT/PLAN:  1. Nosebleed  -     sodium chloride (ALTAMIST SPRAY) 0.65 % nasal spray; 1 spray by Nasal route as needed for Congestion, Disp-1 each, R-3Normal  2. Acquired hypothyroidism  The patient has evidence of posterior bleeding especially more on the left side at this point we will marina try conservative treatment including humidifying the air using normal saline spray and see if that in combination with the eliminating the use of any antihistamines or cortisone sprays would be sufficient if he continues to have the spray he can contact me and we will refer him to ENT to have posterior visualization to decide if anything needs to cauterized with patient thyroid function with no active at this point the same dose and reassess it  Since in the past he did go to the other extreme when he raised the dose    No follow-ups on file.         Subjective   SUBJECTIVE/OBJECTIVE:    Lab Review   Lab Results   Component Value Date/Time     10/15/2024 04:35 PM     05/15/2024 07:56 PM     01/15/2024 03:56 PM    K 4.2 10/15/2024 04:35 PM    K 4.1 05/15/2024 07:56 PM    K 4.0 01/15/2024 03:56 PM    K 4.1 2021 09:22 AM    CO2 23 10/15/2024 04:35 PM    CO2 23 05/15/2024 07:56 PM    CO2 21 01/15/2024 03:56 PM    BUN 17 10/15/2024 04:35 PM    BUN 17 05/15/2024 07:56 PM    BUN 13 01/15/2024 03:56 PM    CREATININE 1.0 10/15/2024 04:35 PM    CREATININE 0.8 05/15/2024 07:56 PM    CREATININE 0.9 01/15/2024 03:56 PM    GLUCOSE 95 10/15/2024 04:35 PM    GLUCOSE 90 05/15/2024 07:56 PM    GLUCOSE 79 01/15/2024 03:56 PM    CALCIUM 9.6 10/15/2024 04:35 PM    CALCIUM 9.2 05/15/2024 07:56 PM    CALCIUM 8.9 01/15/2024 03:56 PM     Lab Results   Component Value Date/Time    WBC 15.7 10/15/2024 04:35 PM    WBC 10.6 05/15/2024 07:56 PM    WBC 10.0

## 2024-11-30 DIAGNOSIS — F32.0 MAJOR DEPRESSIVE DISORDER, SINGLE EPISODE, MILD (HCC): ICD-10-CM

## 2024-12-02 RX ORDER — VENLAFAXINE HYDROCHLORIDE 75 MG/1
CAPSULE, EXTENDED RELEASE ORAL
Qty: 90 CAPSULE | Refills: 1 | Status: SHIPPED | OUTPATIENT
Start: 2024-12-02

## 2024-12-02 RX ORDER — LEVOTHYROXINE SODIUM 137 UG/1
137 TABLET ORAL DAILY
Qty: 90 TABLET | Refills: 1 | Status: SHIPPED | OUTPATIENT
Start: 2024-12-02

## 2025-04-01 ENCOUNTER — OFFICE VISIT (OUTPATIENT)
Dept: INTERNAL MEDICINE CLINIC | Age: 50
End: 2025-04-01

## 2025-04-01 VITALS
BODY MASS INDEX: 40.5 KG/M2 | HEART RATE: 94 BPM | WEIGHT: 267.2 LBS | DIASTOLIC BLOOD PRESSURE: 86 MMHG | SYSTOLIC BLOOD PRESSURE: 126 MMHG | OXYGEN SATURATION: 97 % | HEIGHT: 68 IN

## 2025-04-01 DIAGNOSIS — E78.2 MIXED HYPERLIPIDEMIA: ICD-10-CM

## 2025-04-01 DIAGNOSIS — R73.03 PREDIABETES: ICD-10-CM

## 2025-04-01 DIAGNOSIS — R31.9 HEMATURIA, UNSPECIFIED TYPE: Primary | ICD-10-CM

## 2025-04-01 DIAGNOSIS — E03.9 ACQUIRED HYPOTHYROIDISM: ICD-10-CM

## 2025-04-01 LAB
BILIRUBIN, POC: NORMAL
BLOOD URINE, POC: NEGATIVE
CHOLEST SERPL-MCNC: 259 MG/DL (ref 0–199)
CLARITY, POC: CLEAR
COLOR, POC: YELLOW
GLUCOSE URINE, POC: NEGATIVE MG/DL
HDLC SERPL-MCNC: 36 MG/DL (ref 40–60)
KETONES, POC: NEGATIVE MG/DL
LDLC SERPL CALC-MCNC: ABNORMAL MG/DL
LEUKOCYTE EST, POC: NORMAL
NITRITE, POC: NEGATIVE
PH, POC: 7
PROTEIN, POC: NORMAL MG/DL
SPECIFIC GRAVITY, POC: 1.02
T4 FREE SERPL-MCNC: 0.9 NG/DL (ref 0.9–1.8)
TRIGL SERPL-MCNC: 413 MG/DL (ref 0–150)
TSH SERPL DL<=0.005 MIU/L-ACNC: 3.76 UIU/ML (ref 0.27–4.2)
UROBILINOGEN, POC: NORMAL MG/DL
VLDLC SERPL CALC-MCNC: ABNORMAL MG/DL

## 2025-04-01 SDOH — ECONOMIC STABILITY: FOOD INSECURITY: WITHIN THE PAST 12 MONTHS, THE FOOD YOU BOUGHT JUST DIDN'T LAST AND YOU DIDN'T HAVE MONEY TO GET MORE.: NEVER TRUE

## 2025-04-01 SDOH — ECONOMIC STABILITY: FOOD INSECURITY: WITHIN THE PAST 12 MONTHS, YOU WORRIED THAT YOUR FOOD WOULD RUN OUT BEFORE YOU GOT MONEY TO BUY MORE.: NEVER TRUE

## 2025-04-01 ASSESSMENT — PATIENT HEALTH QUESTIONNAIRE - PHQ9
SUM OF ALL RESPONSES TO PHQ QUESTIONS 1-9: 0
9. THOUGHTS THAT YOU WOULD BE BETTER OFF DEAD, OR OF HURTING YOURSELF: NOT AT ALL
3. TROUBLE FALLING OR STAYING ASLEEP: NOT AT ALL
10. IF YOU CHECKED OFF ANY PROBLEMS, HOW DIFFICULT HAVE THESE PROBLEMS MADE IT FOR YOU TO DO YOUR WORK, TAKE CARE OF THINGS AT HOME, OR GET ALONG WITH OTHER PEOPLE: NOT DIFFICULT AT ALL
SUM OF ALL RESPONSES TO PHQ QUESTIONS 1-9: 0
8. MOVING OR SPEAKING SO SLOWLY THAT OTHER PEOPLE COULD HAVE NOTICED. OR THE OPPOSITE, BEING SO FIGETY OR RESTLESS THAT YOU HAVE BEEN MOVING AROUND A LOT MORE THAN USUAL: NOT AT ALL
1. LITTLE INTEREST OR PLEASURE IN DOING THINGS: NOT AT ALL
6. FEELING BAD ABOUT YOURSELF - OR THAT YOU ARE A FAILURE OR HAVE LET YOURSELF OR YOUR FAMILY DOWN: NOT AT ALL
1. LITTLE INTEREST OR PLEASURE IN DOING THINGS: NOT AT ALL
SUM OF ALL RESPONSES TO PHQ QUESTIONS 1-9: 0
SUM OF ALL RESPONSES TO PHQ QUESTIONS 1-9: 0
2. FEELING DOWN, DEPRESSED OR HOPELESS: NOT AT ALL
4. FEELING TIRED OR HAVING LITTLE ENERGY: NOT AT ALL
2. FEELING DOWN, DEPRESSED OR HOPELESS: NOT AT ALL
3. TROUBLE FALLING OR STAYING ASLEEP: NOT AT ALL
7. TROUBLE CONCENTRATING ON THINGS, SUCH AS READING THE NEWSPAPER OR WATCHING TELEVISION: NOT AT ALL
SUM OF ALL RESPONSES TO PHQ QUESTIONS 1-9: 0
SUM OF ALL RESPONSES TO PHQ QUESTIONS 1-9: 0
5. POOR APPETITE OR OVEREATING: NOT AT ALL

## 2025-04-01 ASSESSMENT — ENCOUNTER SYMPTOMS: HOARSE VOICE: 0

## 2025-04-01 NOTE — PROGRESS NOTES
Sahil Fenton (:  1975) is a 49 y.o. male,Established patient, here for evaluation of the following chief complaint(s):  Lower Back Pain and Hematuria      Assessment & Plan   ASSESSMENT/PLAN:  1. Hematuria, unspecified type  -     POCT Urinalysis no Micro  -     POCT Urinalysis no Micro  -     Urinalysis with Microscopic; Future  2. Acquired hypothyroidism  -     T4, Free; Future  -     TSH; Future  3. Mixed hyperlipidemia  -     Lipid Panel; Future  4. Prediabetes  -     Hemoglobin A1C; Future    Assessment & Plan  1. Hematuria.  He reports streaks of blood in his urine and occasional episodes where blood appears instead of urine. There is no family history of prostate, bladder, or kidney cancer. He experiences mild discomfort in the lower back area. A urine test will be conducted to verify the presence of blood. If blood is detected, the sample will be sent to the lab for further analysis. A CT urogram will be ordered to check for kidney stones or polyps, and a referral to a urologist will be made. If the urine test is negative, the condition will be monitored.    2. Hypothyroidism.  His thyroid levels have been fluctuating, with the most recent test in October showing slightly elevated levels. A repeat blood test for TSH and T4 will be conducted today to assess current thyroid function.    3. Hypercholesterolemia.  He is currently not taking his cholesterol medication. Previous tests did not show any heart abnormalities, and his risk was not in the treatment benefit group. A repeat cholesterol test will be ordered today to determine if medication is necessary. The cholesterol medication will be removed from his list until the new results are available.      Return in about 3 months (around 2025) for Physical.         Subjective   SUBJECTIVE/OBJECTIVE:    Lab Review   Lab Results   Component Value Date/Time     10/15/2024 04:35 PM     05/15/2024 07:56 PM     01/15/2024 03:56 PM

## 2025-04-02 ENCOUNTER — RESULTS FOLLOW-UP (OUTPATIENT)
Dept: INTERNAL MEDICINE CLINIC | Age: 50
End: 2025-04-02

## 2025-04-02 LAB
BACTERIA URNS QL MICRO: ABNORMAL /HPF
BILIRUB UR QL STRIP.AUTO: NEGATIVE
CLARITY UR: CLEAR
COLOR UR: YELLOW
EPI CELLS #/AREA URNS AUTO: 3 /HPF (ref 0–5)
EST. AVERAGE GLUCOSE BLD GHB EST-MCNC: 116.9 MG/DL
GLUCOSE UR STRIP.AUTO-MCNC: NEGATIVE MG/DL
HBA1C MFR BLD: 5.7 %
HGB UR QL STRIP.AUTO: NEGATIVE
HYALINE CASTS #/AREA URNS AUTO: 0 /LPF (ref 0–8)
KETONES UR STRIP.AUTO-MCNC: ABNORMAL MG/DL
LDLC SERPL-MCNC: 167 MG/DL
LEUKOCYTE ESTERASE UR QL STRIP.AUTO: NEGATIVE
NITRITE UR QL STRIP.AUTO: NEGATIVE
PH UR STRIP.AUTO: 7.5 [PH] (ref 5–8)
PROT UR STRIP.AUTO-MCNC: ABNORMAL MG/DL
RBC CLUMPS #/AREA URNS AUTO: 0 /HPF (ref 0–4)
SP GR UR STRIP.AUTO: 1.02 (ref 1–1.03)
UA DIPSTICK W REFLEX MICRO PNL UR: YES
URN SPEC COLLECT METH UR: ABNORMAL
UROBILINOGEN UR STRIP-ACNC: 0.2 E.U./DL
WBC #/AREA URNS AUTO: 2 /HPF (ref 0–5)

## 2025-04-02 NOTE — RESULT ENCOUNTER NOTE
Please advise patient that the results were acceptable with the exception of the cholesterol and sugar being elevated, the patient is advised to work on aggressive lifestyle modification including different food choices as well as lowering the fat content of the diet in addition to daily exercises with a minimum of 150 minutes/week in 10-minute increments of mild to moderate exercise like brisk walking    The 10-year ASCVD risk score (Tommy PETERSON, et al., 2019) is: 5.8%    Values used to calculate the score:      Age: 49 years      Sex: Male      Is Non- : No      Diabetic: No      Tobacco smoker: No      Systolic Blood Pressure: 126 mmHg      Is BP treated: No      HDL Cholesterol: 36 mg/dL      Total Cholesterol: 259 mg/dL

## 2025-04-22 RX ORDER — FLUTICASONE PROPIONATE AND SALMETEROL 250; 50 UG/1; UG/1
1 POWDER RESPIRATORY (INHALATION) 2 TIMES DAILY
COMMUNITY
Start: 2025-02-03

## 2025-04-23 RX ORDER — FLUTICASONE PROPIONATE AND SALMETEROL 250; 50 UG/1; UG/1
1 POWDER RESPIRATORY (INHALATION) 2 TIMES DAILY
Qty: 60 EACH | Refills: 3 | Status: SHIPPED | OUTPATIENT
Start: 2025-04-23

## 2025-04-24 ENCOUNTER — TELEPHONE (OUTPATIENT)
Dept: CARDIOLOGY CLINIC | Age: 50
End: 2025-04-24

## 2025-04-24 NOTE — TELEPHONE ENCOUNTER
Tried calling patient twice phone is going straight to v/m, left message for patient to call us back.

## 2025-04-24 NOTE — TELEPHONE ENCOUNTER
Pt said that since yesterday he has been experiencing a pain in his chest. It is constant and sometimes gets worse. Denies sob, dizziness, lightheaded. Please call to discuss.    [phone]

## 2025-04-24 NOTE — TELEPHONE ENCOUNTER
Saw Dr. Holland last year d/t abnormal stress test. Underwent TTE and CCTA w/ normal findings. Hx of cervical radiculopathy with steroid treatment.

## 2025-04-24 NOTE — TELEPHONE ENCOUNTER
Call placed to patient:     Seen ADM last summer for right sided chest pain. States that the same pain that he had last summer started back up yesterday. Patient states that he was sitting at his desk when pains happened. Denies any shortness of breath, chest pressure, arm pain/numbness/tingling, or jaw pain. Patient states that he is just having the consistent right side of chest pain.

## 2025-04-25 NOTE — TELEPHONE ENCOUNTER
Spoke with patient, message relayed as noted per ADM. Patient is agreeable, verbalized understanding.

## 2025-06-09 RX ORDER — LEVOTHYROXINE SODIUM 137 UG/1
137 TABLET ORAL DAILY
Qty: 90 TABLET | Refills: 1 | Status: SHIPPED | OUTPATIENT
Start: 2025-06-09

## 2025-06-09 NOTE — TELEPHONE ENCOUNTER
Last OV: 4/1/2025  Next OV: 7/1/2025    Next appointment due:around 7/1/2025     Last fill:12/2/24  Refills:1#90

## 2025-07-14 DIAGNOSIS — F32.0 MAJOR DEPRESSIVE DISORDER, SINGLE EPISODE, MILD: ICD-10-CM

## 2025-07-14 RX ORDER — VENLAFAXINE HYDROCHLORIDE 75 MG/1
CAPSULE, EXTENDED RELEASE ORAL DAILY
Qty: 90 CAPSULE | Refills: 0 | Status: SHIPPED | OUTPATIENT
Start: 2025-07-14

## 2025-07-14 NOTE — TELEPHONE ENCOUNTER
Last OV: 4/1/2025  Next OV: Visit date not found    Next appointment due:ut 3 months (around 7/1/2025) for Physical.       Last fill:12/2/24  Refills:1 #90

## 2025-09-05 ENCOUNTER — PATIENT MESSAGE (OUTPATIENT)
Dept: INTERNAL MEDICINE CLINIC | Age: 50
End: 2025-09-05

## 2025-09-05 ENCOUNTER — OFFICE VISIT (OUTPATIENT)
Dept: INTERNAL MEDICINE CLINIC | Age: 50
End: 2025-09-05
Payer: COMMERCIAL

## 2025-09-05 VITALS
TEMPERATURE: 97.9 F | WEIGHT: 280.2 LBS | SYSTOLIC BLOOD PRESSURE: 130 MMHG | DIASTOLIC BLOOD PRESSURE: 90 MMHG | HEART RATE: 91 BPM | OXYGEN SATURATION: 98 % | HEIGHT: 68 IN | BODY MASS INDEX: 42.47 KG/M2

## 2025-09-05 DIAGNOSIS — J06.9 URTI (ACUTE UPPER RESPIRATORY INFECTION): ICD-10-CM

## 2025-09-05 DIAGNOSIS — F32.0 MAJOR DEPRESSIVE DISORDER, SINGLE EPISODE, MILD: ICD-10-CM

## 2025-09-05 DIAGNOSIS — E03.9 ACQUIRED HYPOTHYROIDISM: ICD-10-CM

## 2025-09-05 DIAGNOSIS — R73.03 PREDIABETES: ICD-10-CM

## 2025-09-05 DIAGNOSIS — J45.30 MILD PERSISTENT ASTHMA WITHOUT COMPLICATION: ICD-10-CM

## 2025-09-05 DIAGNOSIS — J30.1 SEASONAL ALLERGIC RHINITIS DUE TO POLLEN: Primary | ICD-10-CM

## 2025-09-05 PROCEDURE — 99214 OFFICE O/P EST MOD 30 MIN: CPT | Performed by: INTERNAL MEDICINE

## 2025-09-05 RX ORDER — FLUTICASONE PROPIONATE 50 MCG
2 SPRAY, SUSPENSION (ML) NASAL DAILY
Qty: 48 G | Refills: 1 | Status: SHIPPED | OUTPATIENT
Start: 2025-09-05

## 2025-09-05 RX ORDER — BENZONATATE 200 MG/1
200 CAPSULE ORAL 3 TIMES DAILY PRN
Qty: 30 CAPSULE | Refills: 0 | Status: SHIPPED | OUTPATIENT
Start: 2025-09-05 | End: 2025-09-12

## 2025-09-05 RX ORDER — LORATADINE 10 MG/1
10 TABLET ORAL DAILY
Qty: 30 TABLET | Refills: 2 | Status: SHIPPED | OUTPATIENT
Start: 2025-09-05

## 2025-09-05 RX ORDER — PREDNISONE 20 MG/1
20 TABLET ORAL 2 TIMES DAILY
Qty: 10 TABLET | Refills: 0 | Status: SHIPPED | OUTPATIENT
Start: 2025-09-05 | End: 2025-09-10

## 2025-09-05 RX ORDER — VENLAFAXINE HYDROCHLORIDE 75 MG/1
CAPSULE, EXTENDED RELEASE ORAL DAILY
Qty: 90 CAPSULE | Refills: 1 | Status: SHIPPED | OUTPATIENT
Start: 2025-09-05

## 2025-09-05 RX ORDER — ALBUTEROL SULFATE 0.83 MG/ML
2.5 SOLUTION RESPIRATORY (INHALATION) EVERY 6 HOURS PRN
Qty: 120 EACH | Refills: 3 | Status: SHIPPED | OUTPATIENT
Start: 2025-09-05

## 2025-09-05 RX ORDER — AZITHROMYCIN 250 MG/1
250 TABLET, FILM COATED ORAL SEE ADMIN INSTRUCTIONS
Qty: 6 TABLET | Refills: 0 | Status: SHIPPED | OUTPATIENT
Start: 2025-09-05 | End: 2025-09-10

## 2025-09-05 ASSESSMENT — ENCOUNTER SYMPTOMS
SHORTNESS OF BREATH: 0
COUGH: 0
BLOOD IN STOOL: 0
WHEEZING: 0
SINUS PAIN: 0
CHEST TIGHTNESS: 0
CONSTIPATION: 0
ABDOMINAL PAIN: 0
COLOR CHANGE: 0